# Patient Record
Sex: MALE | Race: WHITE | Employment: FULL TIME | URBAN - METROPOLITAN AREA
[De-identification: names, ages, dates, MRNs, and addresses within clinical notes are randomized per-mention and may not be internally consistent; named-entity substitution may affect disease eponyms.]

---

## 2017-01-06 ENCOUNTER — OFFICE VISIT (OUTPATIENT)
Dept: PEDIATRICS | Facility: CLINIC | Age: 52
End: 2017-01-06
Payer: COMMERCIAL

## 2017-01-06 VITALS
HEART RATE: 95 BPM | RESPIRATION RATE: 20 BRPM | BODY MASS INDEX: 34.02 KG/M2 | DIASTOLIC BLOOD PRESSURE: 60 MMHG | OXYGEN SATURATION: 97 % | WEIGHT: 224.5 LBS | SYSTOLIC BLOOD PRESSURE: 128 MMHG | HEIGHT: 68 IN | TEMPERATURE: 98.3 F

## 2017-01-06 DIAGNOSIS — R35.0 URINARY FREQUENCY: ICD-10-CM

## 2017-01-06 DIAGNOSIS — K57.32 DIVERTICULITIS OF COLON: Primary | ICD-10-CM

## 2017-01-06 LAB
ALBUMIN UR-MCNC: NEGATIVE MG/DL
APPEARANCE UR: CLEAR
BILIRUB UR QL STRIP: NEGATIVE
COLOR UR AUTO: YELLOW
ERYTHROCYTE [DISTWIDTH] IN BLOOD BY AUTOMATED COUNT: 13.4 % (ref 10–15)
GLUCOSE UR STRIP-MCNC: NEGATIVE MG/DL
HCT VFR BLD AUTO: 44.9 % (ref 40–53)
HGB BLD-MCNC: 14.9 G/DL (ref 13.3–17.7)
HGB UR QL STRIP: NEGATIVE
KETONES UR STRIP-MCNC: NEGATIVE MG/DL
LEUKOCYTE ESTERASE UR QL STRIP: NEGATIVE
MCH RBC QN AUTO: 28.3 PG (ref 26.5–33)
MCHC RBC AUTO-ENTMCNC: 33.2 G/DL (ref 31.5–36.5)
MCV RBC AUTO: 85 FL (ref 78–100)
NITRATE UR QL: NEGATIVE
PH UR STRIP: 5.5 PH (ref 5–7)
PLATELET # BLD AUTO: 228 10E9/L (ref 150–450)
RBC # BLD AUTO: 5.27 10E12/L (ref 4.4–5.9)
RBC #/AREA URNS AUTO: NORMAL /HPF (ref 0–2)
SP GR UR STRIP: 1.01 (ref 1–1.03)
URN SPEC COLLECT METH UR: NORMAL
UROBILINOGEN UR STRIP-ACNC: 0.2 EU/DL (ref 0.2–1)
WBC # BLD AUTO: 9.8 10E9/L (ref 4–11)
WBC #/AREA URNS AUTO: NORMAL /HPF (ref 0–2)

## 2017-01-06 PROCEDURE — 36415 COLL VENOUS BLD VENIPUNCTURE: CPT | Performed by: PHYSICIAN ASSISTANT

## 2017-01-06 PROCEDURE — 81001 URINALYSIS AUTO W/SCOPE: CPT | Performed by: PHYSICIAN ASSISTANT

## 2017-01-06 PROCEDURE — 99213 OFFICE O/P EST LOW 20 MIN: CPT | Performed by: PHYSICIAN ASSISTANT

## 2017-01-06 PROCEDURE — 85027 COMPLETE CBC AUTOMATED: CPT | Performed by: PHYSICIAN ASSISTANT

## 2017-01-06 RX ORDER — AMOXICILLIN AND CLAVULANATE POTASSIUM 500; 125 MG/1; MG/1
1 TABLET, FILM COATED ORAL 3 TIMES DAILY
Qty: 30 TABLET | Refills: 0 | Status: ON HOLD | OUTPATIENT
Start: 2017-01-06 | End: 2017-01-14

## 2017-01-06 NOTE — PATIENT INSTRUCTIONS
Diverticulitis    Some people get pouches along the wall of the colon as they get older. The pouches, called diverticuli, usually cause no symptoms. If the pouches become blocked, you can get an infection. This infection is called diverticulitis. It causes pain in your lower abdomen and fever. If not treated, it can become a serious condition, causing an abscess to form inside the pouch. The abscess may block the intestinal tract even or rupture, spreading infection throughout the abdomen.  When treatment is started early, oral antibiotics alone may be enough to cure diverticulitis. This method is tried first. But, if you don't improve or if your condition gets worse while you are trying oral antibiotics, you may need to be admitted to the hospital for IV antibiotics. Severe cases may require surgery.  Home care  The following guidelines will help you care for yourself at home:    During the acute illness, rest and follow a low-fiber diet. Include foods like:    Flake cereal, mashed potatoes, pancakes, waffles, pasta, white bread, rice, applesauce, bananas, eggs, meat, fish, poultry, tofu, and cooked vegetables    Take antibiotics exactly as the doctor says. Don't miss any doses or stop taking the medication, even if you feel better.    Monitor your temperature and report any rising temperatures to your doctor.  Preventing future attacks  Once you have had an episode of diverticulitis, you are at risk for having it again. After you have recovered from this episode, you may be able to reduce your risk by eating a high-fiber diet (20-35 gm/day of fiber). This cleans out the colon pouches that already exist and prevents new ones from forming. Foods high in fiber include fresh fruits and edible peelings, raw or lightly cooked vegetables, whole grain cereals and breads, dried beans and peas, and bran.  Other steps that can help prevent future attacks include:    Take your medications, such as antibiotics, as the doctor  says.    Drink 6 to 8 glasses of water every day, unless directed otherwise.    Use a heating pad or hot water bottle to reduce abdominal cramping or pain.    Begin an exercise program. Ask your doctor how to get started. You can benefit from simple activities such as walking or gardening.    Treat diarrhea with a bland diet. Start with liquids only; then slowly add fiber over time.    Watch for changes in your bowel movements (constipation to diarrhea).    Get plenty of rest and sleep.  Follow-up care  Follow up with your doctor as advised or sooner if you are not getting better in the next 2 days.  When to seek medical care  Get prompt medical attention if any of the following occur:    Fever of 100.4 F (38 C) or higher, or as directed by your health care provider    Repeated vomiting or swelling of the abdomen    Weakness, dizziness, light-headedness    Pain in your abdomen that gets worse, severe, or spreads to your back    Pain that moves to the right lower abdomen    Rectal bleeding (stools that are red, black or maroon color)    Unexpected vaginal bleeding    8858-0322 [employer]. [address] [place] [address], [place] [address]. All rights reserved. This information is not intended as a substitute for professional medical care. Always follow your healthcare professional's instructions.

## 2017-01-06 NOTE — PROGRESS NOTES
"  SUBJECTIVE:                                                    Phillip Nassar is a 51 year old male who presents to clinic today for the following health issues:      ABDOMINAL PAIN     Onset: 4 days ago     Description:   Character: Gnawing  Location: middle lower quadrant  Radiation: Back and Pelvic region    Intensity: mild-upper moderate/severe-AM    Progression of Symptoms:  improving    Accompanying Signs & Symptoms:  Fever/Chills?: no   Gas/Bloating: YES  Nausea: no   Vomitting: no   Diarrhea?: YES- loose stools  Constipation:YES- possibly  Dysuria or Hematuria: no    History:   Trauma: no   Previous similar pain: YES   Previous tests done: CT/hospitalized for small bowel obstruction a few years ago    Precipitating factors:   Does the pain change with:     Food: no -clear lqiuds    BM: YES- starts to subside    Urination: no     Alleviating factors:  Cranberry Juice    Therapies Tried and outcome: Cranberry Juice and fluids     LMP:  not applicable       Medical: History of Diverticulitis    ROS:  ROS negative except as listed above      OBJECTIVE:                                                    /60 mmHg  Pulse 95  Temp(Src) 98.3  F (36.8  C) (Oral)  Resp 20  Ht 5' 8\" (1.727 m)  Wt 224 lb 8 oz (101.833 kg)  BMI 34.14 kg/m2  SpO2 97%  Body mass index is 34.14 kg/(m^2).  GENERAL: healthy, alert and no distress  EYES: Eyes grossly normal to inspection, PERRL and conjunctivae and sclerae normal  HENT: ear canals and TM's normal, nose and mouth without ulcers or lesions  NECK: no adenopathy, no asymmetry, masses, or scars and thyroid normal to palpation  RESP: lungs clear to auscultation - no rales, rhonchi or wheezes  CV: regular rate and rhythm, normal S1 S2, no S3 or S4, no murmur, click or rub, no peripheral edema and peripheral pulses strong  ABDOMEN: LLQ tenderness.  No rebound tenderness.  Otherwise soft, nontender, no hepatosplenomegaly, no masses and bowel sounds normal  MS: no gross " musculoskeletal defects noted, no edema    Diagnostic Test Results:  Results for orders placed or performed in visit on 01/06/17 (from the past 24 hour(s))   UA with Microscopic reflex to Culture   Result Value Ref Range    Color Urine Yellow     Appearance Urine Clear     Glucose Urine Negative NEG mg/dL    Bilirubin Urine Negative NEG    Ketones Urine Negative NEG mg/dL    Specific Gravity Urine 1.010 1.003 - 1.035    pH Urine 5.5 5.0 - 7.0 pH    Protein Albumin Urine Negative NEG mg/dL    Urobilinogen Urine 0.2 0.2 - 1.0 EU/dL    Nitrite Urine Negative NEG    Blood Urine Negative NEG    Leukocyte Esterase Urine Negative NEG    Source Midstream Urine     WBC Urine O - 2 0 - 2 /HPF    RBC Urine O - 2 0 - 2 /HPF   CBC with platelets   Result Value Ref Range    WBC 9.8 4.0 - 11.0 10e9/L    RBC Count 5.27 4.4 - 5.9 10e12/L    Hemoglobin 14.9 13.3 - 17.7 g/dL    Hematocrit 44.9 40.0 - 53.0 %    MCV 85 78 - 100 fl    MCH 28.3 26.5 - 33.0 pg    MCHC 33.2 31.5 - 36.5 g/dL    RDW 13.4 10.0 - 15.0 %    Platelet Count 228 150 - 450 10e9/L        ASSESSMENT/PLAN:                                                    (K57.32) Diverticulitis of colon  (primary encounter diagnosis)  Comment: LLQ pain, History of diverticulitis  Plan: CBC with platelets, amoxicillin-clavulanate         (AUGMENTIN) 500-125 MG per tablet,         acetaminophen-codeine (TYLENOL #3) 300-30 MG         per tablet   Restricted diet   Follow up with PCP on Monday        Discussed the importance of using pain as a measure of disease progression    (R35.0) Urinary frequency  Plan: UA with Microscopic reflex to Culture        Christopher Hernandez PA-C  [facility]

## 2017-01-06 NOTE — MR AVS SNAPSHOT
After Visit Summary   1/6/2017    Phillip Nassar    MRN: [id number]           Patient Information     Date Of Birth          1965        Visit Information        Provider Department      1/6/2017 4:00 PM Christopher Hernandez PA-C [facility]        Today's Diagnoses     Urinary frequency    -  1     Lower abdominal pain         Diverticulitis of colon           Care Instructions      Diverticulitis    Some people get pouches along the wall of the colon as they get older. The pouches, called diverticuli, usually cause no symptoms. If the pouches become blocked, you can get an infection. This infection is called diverticulitis. It causes pain in your lower abdomen and fever. If not treated, it can become a serious condition, causing an abscess to form inside the pouch. The abscess may block the intestinal tract even or rupture, spreading infection throughout the abdomen.  When treatment is started early, oral antibiotics alone may be enough to cure diverticulitis. This method is tried first. But, if you don't improve or if your condition gets worse while you are trying oral antibiotics, you may need to be admitted to the hospital for IV antibiotics. Severe cases may require surgery.  Home care  The following guidelines will help you care for yourself at home:    During the acute illness, rest and follow a low-fiber diet. Include foods like:    Flake cereal, mashed potatoes, pancakes, waffles, pasta, white bread, rice, applesauce, bananas, eggs, meat, fish, poultry, tofu, and cooked vegetables    Take antibiotics exactly as the doctor says. Don't miss any doses or stop taking the medication, even if you feel better.    Monitor your temperature and report any rising temperatures to your doctor.  Preventing future attacks  Once you have had an episode of diverticulitis, you are at risk for having it again. After you have recovered from this episode, you may be able to reduce your risk  by eating a high-fiber diet (20-35 gm/day of fiber). This cleans out the colon pouches that already exist and prevents new ones from forming. Foods high in fiber include fresh fruits and edible peelings, raw or lightly cooked vegetables, whole grain cereals and breads, dried beans and peas, and bran.  Other steps that can help prevent future attacks include:    Take your medications, such as antibiotics, as the doctor says.    Drink 6 to 8 glasses of water every day, unless directed otherwise.    Use a heating pad or hot water bottle to reduce abdominal cramping or pain.    Begin an exercise program. Ask your doctor how to get started. You can benefit from simple activities such as walking or gardening.    Treat diarrhea with a bland diet. Start with liquids only; then slowly add fiber over time.    Watch for changes in your bowel movements (constipation to diarrhea).    Get plenty of rest and sleep.  Follow-up care  Follow up with your doctor as advised or sooner if you are not getting better in the next 2 days.  When to seek medical care  Get prompt medical attention if any of the following occur:    Fever of 100.4 F (38 C) or higher, or as directed by your health care provider    Repeated vomiting or swelling of the abdomen    Weakness, dizziness, light-headedness    Pain in your abdomen that gets worse, severe, or spreads to your back    Pain that moves to the right lower abdomen    Rectal bleeding (stools that are red, black or maroon color)    Unexpected vaginal bleeding    2361-8068 [employer]. [address] [place] [address], [place] [address]. All rights reserved. This information is not intended as a substitute for professional medical care. Always follow your healthcare professional's instructions.              Follow-ups after your visit        Who to contact     If you have questions or need follow up information about today's clinic visit or your schedule please contact [facility] [place]  "directly at [phone].  Normal or non-critical lab and imaging results will be communicated to you by [employer], letter or phone within 4 business days after the clinic has received the results. If you do not hear from us within 7 days, please contact the clinic through [employer] or phone. If you have a critical or abnormal lab result, we will notify you by phone as soon as possible.  Submit refill requests through [employer] or call your pharmacy and they will forward the refill request to us. Please allow 3 business days for your refill to be completed.          Additional Information About Your Visit        [employer] Information     [employer] gives you secure access to your electronic health record. If you see a primary care provider, you can also send messages to your care team and make appointments. If you have questions, please call your primary care clinic.  If you do not have a primary care provider, please call [phone] and they will assist you.        Care EveryWhere ID     This is your Care EveryWhere ID. This could be used by other organizations to access your [place] medical records  [id number]        Your Vitals Were     Pulse Temperature Respirations Height BMI (Body Mass Index) Pulse Oximetry    95 98.3  F (36.8  C) (Oral) 20 5' 8\" (1.727 m) 34.14 kg/m2 97%       Blood Pressure from Last 3 Encounters:   01/06/17 128/60   09/09/16 120/76   07/05/16 102/70    Weight from Last 3 Encounters:   01/06/17 224 lb 8 oz (101.833 kg)   09/09/16 219 lb 8 oz (99.565 kg)   07/05/16 219 lb (99.338 kg)              We Performed the Following     CBC with platelets     UA with Microscopic reflex to Culture          Today's Medication Changes          These changes are accurate as of: 1/6/17  5:16 PM.  If you have any questions, ask your nurse or doctor.               Start taking these medicines.        Dose/Directions    amoxicillin-clavulanate 500-125 MG per tablet   Commonly known as:  AUGMENTIN   Used for:  Lower " abdominal pain, Diverticulitis of colon   Started by:  Christopher Hernandez PA-C        Dose:  1 tablet   Take 1 tablet by mouth 3 times daily   Quantity:  30 tablet   Refills:  0            Where to get your medicines      These medications were sent to [facility] #[id number] - [place], [place] - [address] AT [place] [address], [place] [address]     Phone:  [phone]    - amoxicillin-clavulanate 500-125 MG per tablet             Primary Care Provider Office Phone # Fax #    Albert Chapman [phone]       [facility] [address] [place] [address]        Thank you!     Thank you for choosing [facility]  for your care. Our goal is always to provide you with excellent care. Hearing back from our patients is one way we can continue to improve our services. Please take a few minutes to complete the written survey that you may receive in the mail after your visit with us. Thank you!             Your Updated Medication List - Protect others around you: Learn how to safely use, store and throw away your medicines at www.disposemymeds.org.          This list is accurate as of: 1/6/17  5:16 PM.  Always use your most recent med list.                   Brand Name Dispense Instructions for use    * albuterol 108 (90 BASE) MCG/ACT Inhaler    PROAIR HFA/PROVENTIL HFA/VENTOLIN HFA    3 Inhaler    Inhale 2 puffs into the lungs every 6 hours as needed for shortness of breath / dyspnea       * albuterol 108 (90 BASE) MCG/ACT Inhaler    albuterol    2 Inhaler    Inhale 2 puffs into the lungs every 6 hours       amoxicillin-clavulanate 500-125 MG per tablet    AUGMENTIN    30 tablet    Take 1 tablet by mouth 3 times daily       fluticasone 50 MCG/ACT spray    FLONASE    3 Package    Spray 1-2 sprays into both nostrils daily       LACTOBACILLUS EXTRA STRENGTH Caps     20 capsule    Take 1 tablet by mouth daily       montelukast 10 MG tablet     SINGULAIR    90 tablet    Take 1 tablet (10 mg) by mouth At Bedtime       MOTRIN IB PO      Take 400 mg by mouth every 4 hours as needed       TYLENOL PO      Take 325 mg by mouth as needed       ZYRTEC ALLERGY 10 MG Caps   Generic drug:  cetirizine HCl          * Notice:  This list has 2 medication(s) that are the same as other medications prescribed for you. Read the directions carefully, and ask your doctor or other care provider to review them with you.

## 2017-01-06 NOTE — NURSING NOTE
"Chief Complaint   Patient presents with     Abdominal Pain       Initial /60 mmHg  Pulse 95  Temp(Src) 98.3  F (36.8  C) (Oral)  Resp 20  Ht 5' 8\" (1.727 m)  Wt 224 lb 8 oz (101.833 kg)  BMI 34.14 kg/m2  SpO2 97% Estimated body mass index is 34.14 kg/(m^2) as calculated from the following:    Height as of this encounter: 5' 8\" (1.727 m).    Weight as of this encounter: 224 lb 8 oz (101.833 kg).  BP completed using cuff size: tawana Noel MA      "

## 2017-01-10 ENCOUNTER — TELEPHONE (OUTPATIENT)
Dept: URGENT CARE | Facility: URGENT CARE | Age: 52
End: 2017-01-10

## 2017-01-10 NOTE — TELEPHONE ENCOUNTER
Patient was directed to follow up with primary on Monday.  It appears he did not.  Please contact him and advise that he have follow up appointment.

## 2017-01-11 ENCOUNTER — MYC MEDICAL ADVICE (OUTPATIENT)
Dept: PEDIATRICS | Facility: CLINIC | Age: 52
End: 2017-01-11

## 2017-01-11 ENCOUNTER — HOSPITAL ENCOUNTER (OUTPATIENT)
Dept: CT IMAGING | Facility: CLINIC | Age: 52
Discharge: HOME | End: 2017-01-11
Attending: PEDIATRICS | Admitting: PEDIATRICS
Payer: COMMERCIAL

## 2017-01-11 ENCOUNTER — OFFICE VISIT (OUTPATIENT)
Dept: PEDIATRICS | Facility: CLINIC | Age: 52
End: 2017-01-11
Payer: COMMERCIAL

## 2017-01-11 ENCOUNTER — HOSPITAL ENCOUNTER (INPATIENT)
Facility: CLINIC | Age: 52
LOS: 3 days | Discharge: HOME | DRG: 392 | End: 2017-01-14
Attending: EMERGENCY MEDICINE | Admitting: INTERNAL MEDICINE
Payer: COMMERCIAL

## 2017-01-11 VITALS
HEIGHT: 68 IN | WEIGHT: 224 LBS | TEMPERATURE: 98.9 F | BODY MASS INDEX: 33.95 KG/M2 | HEART RATE: 82 BPM | SYSTOLIC BLOOD PRESSURE: 128 MMHG | OXYGEN SATURATION: 98 % | DIASTOLIC BLOOD PRESSURE: 60 MMHG

## 2017-01-11 DIAGNOSIS — K57.32 DIVERTICULITIS OF COLON: ICD-10-CM

## 2017-01-11 DIAGNOSIS — K57.92 ACUTE DIVERTICULITIS: Primary | ICD-10-CM

## 2017-01-11 DIAGNOSIS — R35.0 URINARY FREQUENCY: ICD-10-CM

## 2017-01-11 DIAGNOSIS — K57.32 DIVERTICULITIS OF COLON: Primary | ICD-10-CM

## 2017-01-11 DIAGNOSIS — K63.2 ENTERO-COLONIC FISTULA: ICD-10-CM

## 2017-01-11 LAB
ALBUMIN UR-MCNC: NEGATIVE MG/DL
ANION GAP SERPL CALCULATED.3IONS-SCNC: 7 MMOL/L (ref 3–14)
APPEARANCE UR: CLEAR
BASOPHILS # BLD AUTO: 0 10E9/L (ref 0–0.2)
BASOPHILS NFR BLD AUTO: 0.1 %
BILIRUB UR QL STRIP: NEGATIVE
BUN SERPL-MCNC: 10 MG/DL (ref 7–30)
CALCIUM SERPL-MCNC: 9.1 MG/DL (ref 8.5–10.1)
CHLORIDE SERPL-SCNC: 103 MMOL/L (ref 94–109)
CO2 SERPL-SCNC: 29 MMOL/L (ref 20–32)
COLOR UR AUTO: YELLOW
CREAT SERPL-MCNC: 0.82 MG/DL (ref 0.66–1.25)
DIFFERENTIAL METHOD BLD: NORMAL
EOSINOPHIL # BLD AUTO: 0.3 10E9/L (ref 0–0.7)
EOSINOPHIL NFR BLD AUTO: 4 %
ERYTHROCYTE [DISTWIDTH] IN BLOOD BY AUTOMATED COUNT: 13.2 % (ref 10–15)
GFR SERPL CREATININE-BSD FRML MDRD: NORMAL ML/MIN/1.7M2
GLUCOSE SERPL-MCNC: 82 MG/DL (ref 70–99)
GLUCOSE UR STRIP-MCNC: NEGATIVE MG/DL
HCT VFR BLD AUTO: 44 % (ref 40–53)
HGB BLD-MCNC: 14.6 G/DL (ref 13.3–17.7)
HGB UR QL STRIP: NEGATIVE
IMM GRANULOCYTES # BLD: 0 10E9/L (ref 0–0.4)
IMM GRANULOCYTES NFR BLD: 0.1 %
KETONES UR STRIP-MCNC: NEGATIVE MG/DL
LEUKOCYTE ESTERASE UR QL STRIP: NEGATIVE
LYMPHOCYTES # BLD AUTO: 2 10E9/L (ref 0.8–5.3)
LYMPHOCYTES NFR BLD AUTO: 28.2 %
MCH RBC QN AUTO: 28 PG (ref 26.5–33)
MCHC RBC AUTO-ENTMCNC: 33.2 G/DL (ref 31.5–36.5)
MCV RBC AUTO: 85 FL (ref 78–100)
MONOCYTES # BLD AUTO: 0.6 10E9/L (ref 0–1.3)
MONOCYTES NFR BLD AUTO: 9.2 %
NEUTROPHILS # BLD AUTO: 4.1 10E9/L (ref 1.6–8.3)
NEUTROPHILS NFR BLD AUTO: 58.4 %
NITRATE UR QL: NEGATIVE
NRBC # BLD AUTO: 0 10*3/UL
NRBC BLD AUTO-RTO: 0 /100
PH UR STRIP: 7 PH (ref 5–7)
PLATELET # BLD AUTO: 249 10E9/L (ref 150–450)
POTASSIUM SERPL-SCNC: 3.9 MMOL/L (ref 3.4–5.3)
RADIOLOGIST FLAGS: ABNORMAL
RBC # BLD AUTO: 5.21 10E12/L (ref 4.4–5.9)
RBC #/AREA URNS AUTO: <1 /HPF (ref 0–2)
SODIUM SERPL-SCNC: 139 MMOL/L (ref 133–144)
SP GR UR STRIP: 1.04 (ref 1–1.03)
URN SPEC COLLECT METH UR: ABNORMAL
UROBILINOGEN UR STRIP-MCNC: NORMAL MG/DL (ref 0–2)
WBC # BLD AUTO: 7 10E9/L (ref 4–11)
WBC #/AREA URNS AUTO: <1 /HPF (ref 0–2)

## 2017-01-11 PROCEDURE — 25500064 ZZH RX 255 OP 636: Performed by: STUDENT IN AN ORGANIZED HEALTH CARE EDUCATION/TRAINING PROGRAM

## 2017-01-11 PROCEDURE — 99285 EMERGENCY DEPT VISIT HI MDM: CPT | Mod: 25

## 2017-01-11 PROCEDURE — 25000128 H RX IP 250 OP 636: Performed by: EMERGENCY MEDICINE

## 2017-01-11 PROCEDURE — 25000125 ZZHC RX 250: Performed by: STUDENT IN AN ORGANIZED HEALTH CARE EDUCATION/TRAINING PROGRAM

## 2017-01-11 PROCEDURE — 96365 THER/PROPH/DIAG IV INF INIT: CPT

## 2017-01-11 PROCEDURE — 96376 TX/PRO/DX INJ SAME DRUG ADON: CPT

## 2017-01-11 PROCEDURE — 25000125 ZZHC RX 250: Performed by: EMERGENCY MEDICINE

## 2017-01-11 PROCEDURE — 99214 OFFICE O/P EST MOD 30 MIN: CPT | Mod: GC | Performed by: INTERNAL MEDICINE

## 2017-01-11 PROCEDURE — 80048 BASIC METABOLIC PNL TOTAL CA: CPT | Performed by: EMERGENCY MEDICINE

## 2017-01-11 PROCEDURE — 99222 1ST HOSP IP/OBS MODERATE 55: CPT | Mod: AI | Performed by: INTERNAL MEDICINE

## 2017-01-11 PROCEDURE — 85025 COMPLETE CBC W/AUTO DIFF WBC: CPT | Performed by: EMERGENCY MEDICINE

## 2017-01-11 PROCEDURE — 12000000 ZZH R&B MED SURG/OB

## 2017-01-11 PROCEDURE — 96375 TX/PRO/DX INJ NEW DRUG ADDON: CPT

## 2017-01-11 PROCEDURE — 81001 URINALYSIS AUTO W/SCOPE: CPT | Performed by: EMERGENCY MEDICINE

## 2017-01-11 PROCEDURE — 74177 CT ABD & PELVIS W/CONTRAST: CPT

## 2017-01-11 PROCEDURE — 96366 THER/PROPH/DIAG IV INF ADDON: CPT

## 2017-01-11 PROCEDURE — 96367 TX/PROPH/DG ADDL SEQ IV INF: CPT

## 2017-01-11 RX ORDER — IOPAMIDOL 755 MG/ML
135 INJECTION, SOLUTION INTRAVASCULAR ONCE
Status: COMPLETED | OUTPATIENT
Start: 2017-01-11 | End: 2017-01-11

## 2017-01-11 RX ORDER — HYDROMORPHONE HYDROCHLORIDE 1 MG/ML
0.5 INJECTION, SOLUTION INTRAMUSCULAR; INTRAVENOUS; SUBCUTANEOUS ONCE
Status: COMPLETED | OUTPATIENT
Start: 2017-01-11 | End: 2017-01-11

## 2017-01-11 RX ORDER — CIPROFLOXACIN 2 MG/ML
400 INJECTION, SOLUTION INTRAVENOUS ONCE
Status: COMPLETED | OUTPATIENT
Start: 2017-01-11 | End: 2017-01-11

## 2017-01-11 RX ADMIN — CIPROFLOXACIN 400 MG: 2 INJECTION, SOLUTION INTRAVENOUS at 19:33

## 2017-01-11 RX ADMIN — SODIUM CHLORIDE 1000 ML: 9 INJECTION, SOLUTION INTRAVENOUS at 19:22

## 2017-01-11 RX ADMIN — IOPAMIDOL 135 ML: 755 INJECTION, SOLUTION INTRAVENOUS at 14:07

## 2017-01-11 RX ADMIN — HYDROMORPHONE HYDROCHLORIDE 0.5 MG: 1 INJECTION, SOLUTION INTRAMUSCULAR; INTRAVENOUS; SUBCUTANEOUS at 22:33

## 2017-01-11 RX ADMIN — SODIUM CHLORIDE, PRESERVATIVE FREE 84 ML: 5 INJECTION INTRAVENOUS at 14:07

## 2017-01-11 RX ADMIN — METRONIDAZOLE 500 MG: 500 INJECTION, SOLUTION INTRAVENOUS at 20:34

## 2017-01-11 RX ADMIN — HYDROMORPHONE HYDROCHLORIDE 0.5 MG: 1 INJECTION, SOLUTION INTRAMUSCULAR; INTRAVENOUS; SUBCUTANEOUS at 19:21

## 2017-01-11 ASSESSMENT — ENCOUNTER SYMPTOMS
HEMATURIA: 0
NAUSEA: 0
FEVER: 0
DYSURIA: 0
BLOOD IN STOOL: 0
VOMITING: 0
ABDOMINAL PAIN: 1
DIARRHEA: 0

## 2017-01-11 NOTE — IP AVS SNAPSHOT
[place] [address] [facility]    [address]    [facility] [address]    Phone:  [phone]    Fax:  [phone]                                       After Visit Summary   1/11/2017    Phillip Nassar    MRN: [id number]           After Visit Summary Signature Page     I have received my discharge instructions, and my questions have been answered. I have discussed any challenges I see with this plan with the nurse or doctor.    ..........................................................................................................................................  Patient/Patient Representative Signature      ..........................................................................................................................................  Patient Representative Print Name and Relationship to Patient    ..................................................               ................................................  Date                                            Time    ..........................................................................................................................................  Reviewed by Signature/Title    ...................................................              ..............................................  Date                                                            Time

## 2017-01-11 NOTE — NURSING NOTE
"Chief Complaint   Patient presents with     RECHECK     Follow up        Initial /60 mmHg  Pulse 82  Temp(Src) 98.9  F (37.2  C) (Tympanic)  Ht 5' 8\" (1.727 m)  Wt 224 lb (101.606 kg)  BMI 34.07 kg/m2  SpO2 98% Estimated body mass index is 34.07 kg/(m^2) as calculated from the following:    Height as of this encounter: 5' 8\" (1.727 m).    Weight as of this encounter: 224 lb (101.606 kg).  BP completed using cuff size: large    "

## 2017-01-11 NOTE — TELEPHONE ENCOUNTER
Order pended for signature if appropriate. Please check that it is entered correctly as patient wants scheduled at [facility] prior to signature.   Judie Ignacio RN

## 2017-01-11 NOTE — PATIENT INSTRUCTIONS
1. Go to the ED and they will help evaluate if you need admission for IV antibiotics.   2. If you are not admitted you should be seen in clinic in the next 24 - 48 hours.

## 2017-01-11 NOTE — MR AVS SNAPSHOT
After Visit Summary   1/11/2017    Phillip Nassar    MRN: [id number]           Patient Information     Date Of Birth          1965        Visit Information        Provider Department      1/11/2017 4:30 PM Carroll Toney MD [facility]        Care Instructions    1. Go to the ED and they will help evaluate if you need admission for IV antibiotics.   2. If you are not admitted you should be seen in clinic in the next 24 - 48 hours.         Follow-ups after your visit        Future tests that were ordered for you today     Open Future Orders        Priority Expected Expires Ordered    CT Abdomen Pelvis w Contrast Routine  1/11/2018 1/11/2017            Who to contact     If you have questions or need follow up information about today's clinic visit or your schedule please contact [facility] directly at [phone].  Normal or non-critical lab and imaging results will be communicated to you by [employer], letter or phone within 4 business days after the clinic has received the results. If you do not hear from us within 7 days, please contact the clinic through [employer] or phone. If you have a critical or abnormal lab result, we will notify you by phone as soon as possible.  Submit refill requests through [employer] or call your pharmacy and they will forward the refill request to us. Please allow 3 business days for your refill to be completed.          Additional Information About Your Visit        [employer] Information     [employer] gives you secure access to your electronic health record. If you see a primary care provider, you can also send messages to your care team and make appointments. If you have questions, please call your primary care clinic.  If you do not have a primary care provider, please call [phone] and they will assist you.        Care EveryWhere ID     This is your Care EveryWhere ID. This could be used by other organizations to access your [place]  "medical records  [id number]        Your Vitals Were     Pulse Temperature Height BMI (Body Mass Index) Pulse Oximetry       82 98.9  F (37.2  C) (Tympanic) 5' 8\" (1.727 m) 34.07 kg/m2 98%        Blood Pressure from Last 3 Encounters:   01/11/17 128/60   01/06/17 128/60   09/09/16 120/76    Weight from Last 3 Encounters:   01/11/17 224 lb (101.606 kg)   01/06/17 224 lb 8 oz (101.833 kg)   09/09/16 219 lb 8 oz (99.565 kg)              Today, you had the following     No orders found for display       Primary Care Provider Office Phone # Fax #    Albert Chapman [phone]       [facility] [address] [place] [address]        Thank you!     Thank you for choosing [facility]  for your care. Our goal is always to provide you with excellent care. Hearing back from our patients is one way we can continue to improve our services. Please take a few minutes to complete the written survey that you may receive in the mail after your visit with us. Thank you!             Your Updated Medication List - Protect others around you: Learn how to safely use, store and throw away your medicines at www.disposemymeds.org.          This list is accurate as of: 1/11/17  5:19 PM.  Always use your most recent med list.                   Brand Name Dispense Instructions for use    * albuterol 108 (90 BASE) MCG/ACT Inhaler    PROAIR HFA/PROVENTIL HFA/VENTOLIN HFA    3 Inhaler    Inhale 2 puffs into the lungs every 6 hours as needed for shortness of breath / dyspnea       * albuterol 108 (90 BASE) MCG/ACT Inhaler    albuterol    2 Inhaler    Inhale 2 puffs into the lungs every 6 hours       amoxicillin-clavulanate 500-125 MG per tablet    AUGMENTIN    30 tablet    Take 1 tablet by mouth 3 times daily       fluticasone 50 MCG/ACT spray    FLONASE    3 Package    Spray 1-2 sprays into both nostrils daily       LACTOBACILLUS EXTRA STRENGTH Caps     20 capsule    Take 1 tablet by mouth daily       " montelukast 10 MG tablet    SINGULAIR    90 tablet    Take 1 tablet (10 mg) by mouth At Bedtime       MOTRIN IB PO      Take 400 mg by mouth every 4 hours as needed       TYLENOL PO      Take 325 mg by mouth as needed       ZYRTEC ALLERGY 10 MG Caps   Generic drug:  cetirizine HCl          * Notice:  This list has 2 medication(s) that are the same as other medications prescribed for you. Read the directions carefully, and ask your doctor or other care provider to review them with you.

## 2017-01-11 NOTE — PROGRESS NOTES
"  SUBJECTIVE:                                                    Phillip Nassar is a 51 year old male who presents to clinic today for the following health issues:    He had an episodes of ileitis with SBO and concern for diverticulitis about 3 years ago. On 1/4 he developed some aching lower abdominal pain that worsened and became associated with diarrhea over the next several days. He was seen in urgent care on 1/6 and diagnosed clinically with diverticulitis. He was started on Augmentin. He did not have a leukocytosis. His pain and diarrhea gradually improved until last night when he developed more severe pain with radiation throughout his abdomen that was a 5/10 in severity. The pain gradually improved last night into this morning and upon evaluation in clinic he has almost no pain unless palpated. He has been afebrile throughout. His last bowel movement was about 1100 today and was normal. He has had no bloody stools. He has had no nausea or vomiting. He was able to work a full day today. He has had some urinary frequency but no urinary urgency or dysuria.     Problem list and histories reviewed & adjusted, as indicated.  Additional history: as documented    Problem list, Medication list, Allergies, and Medical/Social/Surgical histories reviewed in [facility] and updated as appropriate.    ROS:  Constitutional, HEENT, cardiovascular, pulmonary, gi and gu systems are negative, except as otherwise noted.    OBJECTIVE:                                                    /60 mmHg  Pulse 82  Temp(Src) 98.9  F (37.2  C) (Tympanic)  Ht 5' 8\" (1.727 m)  Wt 224 lb (101.606 kg)  BMI 34.07 kg/m2  SpO2 98%  Body mass index is 34.07 kg/(m^2).  GENERAL: healthy, alert and no distress  NECK: no adenopathy, no asymmetry, masses, or scars and thyroid normal to palpation  HEENT: No mouth sores. Normal OP.   RESP: lungs clear to auscultation - no rales, rhonchi or wheezes  CV: regular rate and rhythm, normal S1 S2, no S3 or " S4, no murmur, click or rub, no peripheral edema and peripheral pulses strong  ABDOMEN: soft, mild suprapubic tenderness without rebound or guarding. No hepatosplenomegaly, no masses and bowel sounds normal  MS: no gross musculoskeletal defects noted, no edema    Diagnostic Test Results:  none      ASSESSMENT/PLAN:                                                    1. Acute diverticulitis  Acute diverticulitis by history and CT, however given past episode of ileitis requiring hospital admission also concern for underlying IBD (Crohns). With concern for fistulization briefly discussed case with GI and emergency department, we don't have any imaging from previous during this bout of pain to assess the progression of this possible fistula. He does not meet SIRS criteria (though we don't have a white blood cell count). Clinically he appears quite well. Advised patient to go to the ED for evaluation for need for admission for IV antibiotics. He was amenable. He is stable and safe to transport himself. If he is not admitted advised that he be seen in the next 48 hours, prior to the weekend.     See patient instructions.     Carroll Riley MD  [facility]    I have seen and examined the patient, discussed with the resident and agree with the history, physical and plan as documented above.  Due to possibility of enterocolonic fistula on imaging, referred to ER for additional evaluation.  Patient agreeable to plan.   Concern for additional underlying IBD diagnosis given history not classic for diverticulitis.    Franca Garcia MD  Internal Medicine - Pediatrics

## 2017-01-11 NOTE — IP AVS SNAPSHOT
MRN:[id number]                      After Visit Summary   1/11/2017    Phillip Nassar    MRN: [id number]           Thank you!     Thank you for choosing [facility] for your care. Our goal is always to provide you with excellent care. Hearing back from our patients is one way we can continue to improve our services. Please take a few minutes to complete the written survey that you may receive in the mail after you visit. If you would like to speak to someone directly about your visit please contact Patient Relations at [phone]. Thank you!          Patient Information     Date Of Birth          1965        About your hospital stay     You were admitted on:  January 11, 2017 You last received care in the:  [place] [id number] [facility]    You were discharged on:  January 14, 2017        Reason for your hospital stay       Flare up of diverticulitis                  Who to Call     For medical emergencies, please call 911.  For non-urgent questions about your medical care, please call your primary care provider or clinic, [phone]          Attending Provider     Provider    Rita Ervin MD    Her, MD Chary       Primary Care Provider Office Phone # Fax #    Albert Chapman [phone]       [facility] [address] [place] [address]        After Care Instructions     Diet       Follow this diet upon discharge: Low fiber diet for next 10 days, gradually advance to regular diet as tolerated                  Follow-up Appointments     Follow-up and recommended labs and tests        Follow up with primary care provider, Albert Chapman, in one week , for hospital follow- up.     Follow up with colo-rectal surgery in about 6 weeks, to discuss plan for future and colonoscopy                  Pending Results     No orders found from 1/10/2017 to 1/12/2017.            Statement of Approval     Ordered          01/14/17 1233  I have  "reviewed and agree with all the recommendations and orders detailed in this document.   EFFECTIVE NOW     Approved and electronically signed by:  Lico Walker MD             Admission Information        Provider Department Dept Phone    1/11/2017 Chary Philippe MD, MD  [facility] [phone]      Your Vitals Were     Blood Pressure Pulse Temperature    111/69 mmHg 67 97.4  F (36.3  C) (Oral)    Respirations Height Weight    18 1.727 m (5' 8\") 100.5 kg (221 lb 9 oz)    BMI (Body Mass Index) Pulse Oximetry       33.70 kg/m2 95%       MyChart Information     [employer] gives you secure access to your electronic health record. If you see a primary care provider, you can also send messages to your care team and make appointments. If you have questions, please call your primary care clinic.  If you do not have a primary care provider, please call [phone] and they will assist you.        Care EveryWhere ID     This is your Care EveryWhere ID. This could be used by other organizations to access your [facility] medical records  [id number]           Review of your medicines      START taking        Dose / Directions    ciprofloxacin 500 MG tablet   Commonly known as:  CIPRO   Used for:  Acute diverticulitis        Dose:  500 mg   Take 1 tablet (500 mg) by mouth 2 times daily for 10 days   Quantity:  20 tablet   Refills:  0       CULTUREE DIGESTIVE HEALTH Caps   Used for:  Acute diverticulitis        Dose:  1 capsule   Take 1 capsule by mouth 2 times daily for 10 days   Quantity:  20 capsule   Refills:  0       HYDROcodone-acetaminophen 5-325 MG per tablet   Commonly known as:  NORCO   Used for:  Acute diverticulitis        Dose:  1 tablet   Take 1 tablet by mouth every 6 hours as needed for moderate to severe pain   Quantity:  15 tablet   Refills:  0       metroNIDAZOLE 500 MG tablet   Commonly known as:  FLAGYL   Used for:  Acute diverticulitis        Dose:  500 mg   Take 1 tablet (500 mg) by mouth 3 times " daily for 10 days   Quantity:  30 tablet   Refills:  0         CONTINUE these medicines which have NOT CHANGED        Dose / Directions    albuterol 108 (90 BASE) MCG/ACT Inhaler   Commonly known as:  PROAIR HFA/PROVENTIL HFA/VENTOLIN HFA   Used for:  Intermittent asthma        Dose:  2 puff   Inhale 2 puffs into the lungs every 6 hours as needed for shortness of breath / dyspnea   Quantity:  3 Inhaler   Refills:  1       fluticasone 50 MCG/ACT spray   Commonly known as:  FLONASE   Used for:  Allergic rhinitis, cause unspecified        Dose:  1-2 spray   Spray 1-2 sprays into both nostrils daily   Quantity:  3 Package   Refills:  3       montelukast 10 MG tablet   Commonly known as:  SINGULAIR   Used for:  Intermittent asthma, uncomplicated        Dose:  10 mg   Take 1 tablet (10 mg) by mouth At Bedtime   Quantity:  90 tablet   Refills:  3       vitamin C-electrolytes 1000mg vitamin C super orange drink mix   Commonly known as:  EMERGEN-C        Dose:  1 packet   Take 1 packet by mouth daily Mix 1 packet in 4-6oz water and take once or twice daily or as directed.   Refills:  0       ZYRTEC ALLERGY 10 MG Caps   Generic drug:  cetirizine HCl        Take by mouth daily   Refills:  0         STOP taking     amoxicillin-clavulanate 500-125 MG per tablet   Commonly known as:  AUGMENTIN                Where to get your medicines      These medications were sent to [facility] [place], [place] - [address] [place] [address]     Phone:  [phone]    - ciprofloxacin 500 MG tablet  - CULTURELLE DIGESTIVE HEALTH Caps  - metroNIDAZOLE 500 MG tablet      Some of these will need a paper prescription and others can be bought over the counter. Ask your nurse if you have questions.     Bring a paper prescription for each of these medications    - HYDROcodone-acetaminophen 5-325 MG per tablet             Protect others around you: Learn how to safely use, store and throw away  your medicines at www.disposemymeds.org.             Medication List: This is a list of all your medications and when to take them. Check marks below indicate your daily home schedule. Keep this list as a reference.      Medications           Morning Afternoon Evening Bedtime As Needed    albuterol 108 (90 BASE) MCG/ACT Inhaler   Commonly known as:  PROAIR HFA/PROVENTIL HFA/VENTOLIN HFA   Inhale 2 puffs into the lungs every 6 hours as needed for shortness of breath / dyspnea                                ciprofloxacin 500 MG tablet   Commonly known as:  CIPRO   Take 1 tablet (500 mg) by mouth 2 times daily for 10 days   Last time this was given:  500 mg on 1/14/2017  8:40 AM                                [facility] Caps   Take 1 capsule by mouth 2 times daily for 10 days                                fluticasone 50 MCG/ACT spray   Commonly known as:  FLONASE   Spray 1-2 sprays into both nostrils daily                                HYDROcodone-acetaminophen 5-325 MG per tablet   Commonly known as:  NORCO   Take 1 tablet by mouth every 6 hours as needed for moderate to severe pain   Last time this was given:  2 tablets on 1/14/2017  8:40 AM                                metroNIDAZOLE 500 MG tablet   Commonly known as:  FLAGYL   Take 1 tablet (500 mg) by mouth 3 times daily for 10 days   Last time this was given:  250 mg on 1/14/2017  5:30 AM                                montelukast 10 MG tablet   Commonly known as:  SINGULAIR   Take 1 tablet (10 mg) by mouth At Bedtime                                vitamin C-electrolytes 1000mg vitamin C super orange drink mix   Commonly known as:  EMERGEN-C   Take 1 packet by mouth daily Mix 1 packet in 4-6oz water and take once or twice daily or as directed.                                ZYRTEC ALLERGY 10 MG Caps   Take by mouth daily   Generic drug:  cetirizine HCl

## 2017-01-12 PROBLEM — K57.92 ACUTE DIVERTICULITIS: Status: ACTIVE | Noted: 2017-01-12

## 2017-01-12 LAB
ANION GAP SERPL CALCULATED.3IONS-SCNC: 5 MMOL/L (ref 3–14)
BUN SERPL-MCNC: 9 MG/DL (ref 7–30)
CALCIUM SERPL-MCNC: 8.2 MG/DL (ref 8.5–10.1)
CHLORIDE SERPL-SCNC: 107 MMOL/L (ref 94–109)
CO2 SERPL-SCNC: 30 MMOL/L (ref 20–32)
CREAT SERPL-MCNC: 0.79 MG/DL (ref 0.66–1.25)
ERYTHROCYTE [DISTWIDTH] IN BLOOD BY AUTOMATED COUNT: 13.1 % (ref 10–15)
GFR SERPL CREATININE-BSD FRML MDRD: ABNORMAL ML/MIN/1.7M2
GLUCOSE SERPL-MCNC: 95 MG/DL (ref 70–99)
HCT VFR BLD AUTO: 40.5 % (ref 40–53)
HGB BLD-MCNC: 13.3 G/DL (ref 13.3–17.7)
MCH RBC QN AUTO: 28.2 PG (ref 26.5–33)
MCHC RBC AUTO-ENTMCNC: 32.8 G/DL (ref 31.5–36.5)
MCV RBC AUTO: 86 FL (ref 78–100)
PLATELET # BLD AUTO: 217 10E9/L (ref 150–450)
POTASSIUM SERPL-SCNC: 3.8 MMOL/L (ref 3.4–5.3)
RBC # BLD AUTO: 4.72 10E12/L (ref 4.4–5.9)
SODIUM SERPL-SCNC: 142 MMOL/L (ref 133–144)
WBC # BLD AUTO: 4.8 10E9/L (ref 4–11)

## 2017-01-12 PROCEDURE — 80048 BASIC METABOLIC PNL TOTAL CA: CPT | Performed by: INTERNAL MEDICINE

## 2017-01-12 PROCEDURE — 85027 COMPLETE CBC AUTOMATED: CPT | Performed by: INTERNAL MEDICINE

## 2017-01-12 PROCEDURE — 25000125 ZZHC RX 250: Performed by: INTERNAL MEDICINE

## 2017-01-12 PROCEDURE — 99232 SBSQ HOSP IP/OBS MODERATE 35: CPT | Performed by: INTERNAL MEDICINE

## 2017-01-12 PROCEDURE — 12000000 ZZH R&B MED SURG/OB

## 2017-01-12 PROCEDURE — 36415 COLL VENOUS BLD VENIPUNCTURE: CPT | Performed by: INTERNAL MEDICINE

## 2017-01-12 PROCEDURE — 25800025 ZZH RX 258: Performed by: INTERNAL MEDICINE

## 2017-01-12 RX ORDER — HYDROMORPHONE HYDROCHLORIDE 1 MG/ML
.3-.5 INJECTION, SOLUTION INTRAMUSCULAR; INTRAVENOUS; SUBCUTANEOUS
Status: DISCONTINUED | OUTPATIENT
Start: 2017-01-12 | End: 2017-01-14 | Stop reason: HOSPADM

## 2017-01-12 RX ORDER — DEXTROSE MONOHYDRATE, SODIUM CHLORIDE, AND POTASSIUM CHLORIDE 50; 1.49; 4.5 G/1000ML; G/1000ML; G/1000ML
INJECTION, SOLUTION INTRAVENOUS CONTINUOUS
Status: DISCONTINUED | OUTPATIENT
Start: 2017-01-12 | End: 2017-01-13

## 2017-01-12 RX ORDER — FLUTICASONE PROPIONATE 50 MCG
1-2 SPRAY, SUSPENSION (ML) NASAL DAILY
Status: DISCONTINUED | OUTPATIENT
Start: 2017-01-12 | End: 2017-01-14 | Stop reason: HOSPADM

## 2017-01-12 RX ORDER — NALOXONE HYDROCHLORIDE 0.4 MG/ML
.1-.4 INJECTION, SOLUTION INTRAMUSCULAR; INTRAVENOUS; SUBCUTANEOUS
Status: DISCONTINUED | OUTPATIENT
Start: 2017-01-12 | End: 2017-01-14 | Stop reason: HOSPADM

## 2017-01-12 RX ORDER — ONDANSETRON 2 MG/ML
4 INJECTION INTRAMUSCULAR; INTRAVENOUS EVERY 6 HOURS PRN
Status: DISCONTINUED | OUTPATIENT
Start: 2017-01-12 | End: 2017-01-14 | Stop reason: HOSPADM

## 2017-01-12 RX ORDER — ACETAMINOPHEN 10 MG/ML
1000 INJECTION, SOLUTION INTRAVENOUS EVERY 6 HOURS PRN
Status: DISCONTINUED | OUTPATIENT
Start: 2017-01-12 | End: 2017-01-14 | Stop reason: HOSPADM

## 2017-01-12 RX ORDER — ONDANSETRON 4 MG/1
4 TABLET, ORALLY DISINTEGRATING ORAL EVERY 6 HOURS PRN
Status: DISCONTINUED | OUTPATIENT
Start: 2017-01-12 | End: 2017-01-14 | Stop reason: HOSPADM

## 2017-01-12 RX ORDER — CIPROFLOXACIN 2 MG/ML
400 INJECTION, SOLUTION INTRAVENOUS EVERY 12 HOURS
Status: DISCONTINUED | OUTPATIENT
Start: 2017-01-12 | End: 2017-01-13

## 2017-01-12 RX ORDER — ALBUTEROL SULFATE 90 UG/1
2 AEROSOL, METERED RESPIRATORY (INHALATION) EVERY 6 HOURS PRN
Status: DISCONTINUED | OUTPATIENT
Start: 2017-01-12 | End: 2017-01-14 | Stop reason: HOSPADM

## 2017-01-12 RX ADMIN — POTASSIUM CHLORIDE, DEXTROSE MONOHYDRATE AND SODIUM CHLORIDE: 150; 5; 450 INJECTION, SOLUTION INTRAVENOUS at 15:11

## 2017-01-12 RX ADMIN — METRONIDAZOLE 500 MG: 500 INJECTION, SOLUTION INTRAVENOUS at 13:50

## 2017-01-12 RX ADMIN — POTASSIUM CHLORIDE, DEXTROSE MONOHYDRATE AND SODIUM CHLORIDE: 150; 5; 450 INJECTION, SOLUTION INTRAVENOUS at 01:15

## 2017-01-12 RX ADMIN — METRONIDAZOLE 500 MG: 500 INJECTION, SOLUTION INTRAVENOUS at 01:49

## 2017-01-12 RX ADMIN — HYDROMORPHONE HYDROCHLORIDE 0.5 MG: 10 INJECTION, SOLUTION INTRAMUSCULAR; INTRAVENOUS; SUBCUTANEOUS at 08:19

## 2017-01-12 RX ADMIN — HYDROMORPHONE HYDROCHLORIDE 0.5 MG: 10 INJECTION, SOLUTION INTRAMUSCULAR; INTRAVENOUS; SUBCUTANEOUS at 05:47

## 2017-01-12 RX ADMIN — HYDROMORPHONE HYDROCHLORIDE 0.5 MG: 10 INJECTION, SOLUTION INTRAMUSCULAR; INTRAVENOUS; SUBCUTANEOUS at 13:44

## 2017-01-12 RX ADMIN — METRONIDAZOLE 500 MG: 500 INJECTION, SOLUTION INTRAVENOUS at 20:34

## 2017-01-12 RX ADMIN — CIPROFLOXACIN 400 MG: 2 INJECTION, SOLUTION INTRAVENOUS at 05:32

## 2017-01-12 RX ADMIN — CIPROFLOXACIN 400 MG: 2 INJECTION, SOLUTION INTRAVENOUS at 17:56

## 2017-01-12 RX ADMIN — HYDROMORPHONE HYDROCHLORIDE 0.5 MG: 10 INJECTION, SOLUTION INTRAMUSCULAR; INTRAVENOUS; SUBCUTANEOUS at 15:51

## 2017-01-12 RX ADMIN — HYDROMORPHONE HYDROCHLORIDE 0.5 MG: 10 INJECTION, SOLUTION INTRAMUSCULAR; INTRAVENOUS; SUBCUTANEOUS at 19:33

## 2017-01-12 RX ADMIN — METRONIDAZOLE 500 MG: 500 INJECTION, SOLUTION INTRAVENOUS at 08:20

## 2017-01-12 NOTE — PROGRESS NOTES
Cognition:  Pt alert and oriented.  Mobility:  Up independently in room tolerated well.  Denies dizziness.  Lungs:  CTA  Heart:  Denies chest pains or dizziness.  Urine:  WDL  Bowels: States entire abdomen painful, pain relief with dilaudid.  Receiving cipro and flagyl.  Edema:  None  Diet:  Tolerating clear liquid diet po.  Pain:  Dilaudid given for abdomen pains with relief.  Plan:  Pt safety, IV fluids, pain medication, antibiotics for diverticulitis, discharge to home.

## 2017-01-12 NOTE — CONSULTS
[facility]  Colon and Rectal Surgery Consult Note  Name: Phillip Nassar    MRN: [id number]  YOB: 1965    Age: 51 year old  Date of admission: 1/11/2017  Primary care provider: Albert Chapman     Requesting Physician:  Yuliana Philippe MD  Reason for consult:  52 yo male with acute diverticulitis.. ?colo-enteral fistula           History of Present Illness:   Phillip Nassar is a 51 year old male who presents with recurrent diverticulitis. He denies previous abdominal surgeries. He reports initial episode of diverticulitis was 3 years ago. At that time he had bloating and abdominal pain and was found to have sigmoid diverticulitis near his ileum on CT scan. He was discharged with antibiotics and improved. He was well until 09/2016 when he had his second episode. This was treated with antibiotics, without CT scan confirmation, and pt improved reportedly in 24 hours.   Most recently pt had onset of similar inferior umbilical abdominal pain similar to previous episodes, however this time without bloating. He had recently changed his diet and started taking Hydroxycut prior to onset. Then on 1/6/17 he presented to his PCP who prescribed him antibiotics. Pt had improvement of pain for a few days but starting yesterday he reports pain began to worsen. He obtained a CT scan and was sent to the ED. Throughout this he has continued to have bowel movements and pass gas. Upon initiating antibiotics he had more mushy stools, but these have now become more formed. He denies fever, chills, nausea, or vomiting.   He has had a colonoscopy within the last few years without abnormalities. He reports FMHx of multiple paternal family members with colon cancer.      Since admission he has remained afebrile and vitally stable. Labs unremarkable, no leukocytosis, WBC 4.8. UA negative. Abd/pelvis CT with sigmoid diverticulitis with inflammatory changes extending to involve the distal ileum. Early coloenteric  "fistulization cannot be excluded, no drainable fluid collection is identified. Inflammatory changes lie immediately superior to urinary bladder, which could explain urinary frequency as mentioned.             Past Medical History:     Past Medical History   Diagnosis Date     Allergic rhinitis, cause unspecified      Mild intermittent asthma      Diverticulitis              Past Surgical History:     Past Surgical History   Procedure Laterality Date     Colonoscopy  3/7/2014     Procedure: COLONOSCOPY;  Colonoscopy  ;  Surgeon: Gucci Martin MD;  Location:  GI               Social History:     Social History   Substance Use Topics     Smoking status: Never Smoker      Smokeless tobacco: Never Used     Alcohol Use: No             Family History:     Family History   Problem Relation Age of Onset     Cancer - colorectal Paternal Aunt      Cancer - colorectal Paternal Uncle      DIABETES Father              Allergies:     Allergies   Allergen Reactions     No Known Drug Allergies              Medications:       fluticasone  1-2 spray Both Nostrils Daily     ciprofloxacin  400 mg Intravenous Q12H     metroNIDAZOLE  500 mg Intravenous Q6H     [START ON 1/13/2017] pneumococcal vaccine  0.5 mL Intramuscular Prior to discharge             Review of Systems:   A comprehensive greater than 10 system review of systems was carried out.  Pertinent positives and negatives are noted above.  Otherwise negative for contributory info.            Physical Exam:   Blood pressure 114/74, temperature 98.4  F (36.9  C), temperature source Oral, resp. rate 18, height 1.727 m (5' 8\"), weight 100.5 kg (221 lb 9 oz), SpO2 93 %.    Intake/Output Summary (Last 24 hours) at 01/12/17 0736  Last data filed at 01/12/17 0703   Gross per 24 hour   Intake    799 ml   Output      0 ml   Net    799 ml     Exam:  GENERAL: Awake alert and oriented  LUNGS: Non labored breathing  ABD: Soft, mild tenderness to palpation of lower mid abdomen. " Nondistended. No rebound or guarding or peritoneal signs.   EXTREMITIES: warm without edema         Data:     Recent Results (from the past 24 hour(s))   CT Abdomen Pelvis w Contrast   Result Value    Radiologist flags (Urgent)     Sigmoid diverticulitis, inflammatory change extending    Narrative    CT ABDOMEN PELVIS W CONTRAST 1/11/2017 2:16 PM    History: diverticulitis and urinary frequency, Diverticulitis of large  intestine without perforation or abscess without bleeding, Frequency  of micturition    Comparison: 9/4/2015    Technique: Helical CT images were obtained from the dome of the liver  to the symphysis pubis with IV contrast.  Images were reconstructed in  multiple planes using multiple reconstruction algorithms.    Contrast: iopamidol (ISOVUE-370) solution 135 mL    Findings:  Lung bases: Small subpleural nodular opacity may represent a focus of  atelectasis (series 2 image 11), in the posterior lateral left lower  lobe. This appears excluded from the field-of-view on 9/24/2015.    Abdomen and pelvis: In the pelvis, there is inflammatory change and  wall thickening in the sigmoid colon, with extensive diverticulosis.  The inflammatory change extends to involve the distal ileum (best  visualized on series 2 image 141). No drainable fluid collection is  identified. The inflammatory change extends to the superior margin of  the urinary bladder. No adenopathy in the abdomen or pelvis. No  intra-abdominal free fluid or air. The remainder of the visualized  abdominal and pelvic viscera are unremarkable.    Subcutaneous soft tissues: Subcutaneous fat and musculature is within  normal limits. No inguinal lymphadenopathy.    Bones: Mild multilevel degenerative change of the visualized spine  without aggressive appearing bony lesion or acute fracture.      Impression    Impression:   1. Sigmoid diverticulitis, with inflammatory changes extending to  involve the distal ileum. Early coloenteric fistulization  cannot be  excluded. No drainable fluid collection is identified.   2. Inflammatory change lies immediately superior to the urinary  bladder, which could explain urinary frequency as questioned.    [Urgent result: Sigmoid diverticulitis, inflammatory change extending  to the ileum, questionable early fistulization]    Findings were relayed to Dr. Toney by Dr. Vallejo by telephone at  2:54 PM, 1/11/2017.           I have personally reviewed the examination and initial interpretation  and I agree with the findings.    JUSTIN SCHAFER MD         Recent Labs  Lab 01/12/17  0620 01/11/17  1851 01/06/17  1658   WBC 4.8 7.0 9.8   HGB 13.3 14.6 14.9   HCT 40.5 44.0 44.9   MCV 86 85 85    249 228       Recent Labs  Lab 01/12/17  0620 01/11/17  1851    139   POTASSIUM 3.8 3.9   CHLORIDE 107 103   CO2 30 29   ANIONGAP 5 7   GLC 95 82   BUN 9 10   CR 0.79 0.82   GFRESTIMATED >90Non  GFR Calc >90Non  GFR Calc   GFRESTBLACK >90African American GFR Calc >90African American GFR Calc   VINICIUS 8.2* 9.1     No results for input(s): LACT in the last 168 hours.    Recent Labs  Lab 01/11/17  1924 01/06/17  1657   COLOR Yellow Yellow   APPEARANCE Clear Clear   URINEGLC Negative Negative   URINEBILI Negative Negative   URINEKETONE Negative Negative   SG 1.036* 1.010   UBLD Negative Negative   URINEPH 7.0 5.5   PROTEIN Negative Negative   UROBILINOGEN  --  0.2   NITRITE Negative Negative   LEUKEST Negative Negative   RBCU <1 O - 2   WBCU <1 O - 2         Assessment and Plan:   Phillip Nassar is a 52 yo man who presents with recurrent diverticulitis. His first episode was 3 years ago which improved with antibiotics. Pt has had episode 4 months ago and current episode started 1/6/17, refractory to outpatient oral antibiotics.    1. No emergent surgical intervention indicated. Agree with non-operative management, would be hopeful to delay surgical intervention until acute inflammation has  resolved.  2. Advance to clear liquid diet  3. Continue antibiotics  4. Continue pain control   3. Will discuss surgical plan and indications with Dr. Urbina. Likely to delay surgery given active inflammation.   4. Will need colonoscopy in outpatient setting in 6-8 weeks      Tianna Yang PA-C  [facility]  [phone]

## 2017-01-12 NOTE — ED NOTES
Pt has abdominal pain since last week and he has hx of diverticulitis.  He has been on antibiotic with no improvement.  CT done today.  Pain is worse despite antibiotic.  Pain now involves whole abdomen.

## 2017-01-12 NOTE — ED PROVIDER NOTES
History     Chief Complaint:  Abdominal Pain    HPI   Phillip Nassar is a 51 year old male with a history of diverticulitis who presents with abdominal pain. His pain first began one week ago and feels consistent with previous bouts of diverticulitis. The patient did obtain antibiotics form PCP, though after multiple doses he did not feel any better. His pain first began suprapubically though seemed to radiate diffusely throughout the abdomen as time went on. He describes the pain as a pressure, and it does seem to be exacerbated by laying flat. He did have a CT today, results below. He denies any fevers, nausea, vomiting, issues with urination.     1/11/2017 CT Abdomen and Pelvis   1. Sigmoid diverticulitis, with inflammatory changes extending to  involve the distal ileum. Early coloenteric fistulization cannot be  excluded. No drainable fluid collection is identified.    2. Inflammatory change lies immediately superior to the urinary  bladder, which could explain urinary frequency as questioned.    Allergies:  No Known Drug Allergies     Medications:    amoxicillin-clavulanate (AUGMENTIN) 500-125 MG per tablet   cetirizine HCl (ZYRTEC ALLERGY) 10 MG CAPS   montelukast (SINGULAIR) 10 MG tablet   albuterol (ALBUTEROL) 108 (90 BASE) MCG/ACT inhaler   albuterol (PROAIR HFA, PROVENTIL HFA, VENTOLIN HFA) 108 (90 BASE) MCG/ACT inhaler   fluticasone (FLONASE) 50 MCG/ACT nasal spray   LACTOBACILLUS EXTRA STRENGTH CAPS   MOTRIN IB PO   Acetaminophen (TYLENOL PO)     Past Medical History:    Allergic rhinitis  Asthma  Diverticulitis     Past Surgical History:    Colonoscopy     Family History:    Diabetes      Social History:  The patient presented to the ED alone.   Smoking Status: Never smoker  Smokeless Tobacco: Never used  Alcohol Use: No   Marital Status:   [2]     Review of Systems   Constitutional: Negative for fever.   Gastrointestinal: Positive for abdominal pain. Negative for nausea, vomiting, diarrhea  "and blood in stool.   Genitourinary: Negative for dysuria and hematuria.   All other systems reviewed and are negative.    Physical Exam   Vitals:  Patient Vitals for the past 24 hrs:   BP Temp Temp src Heart Rate Resp SpO2 Height Weight   01/11/17 2015 130/81 mmHg - - - - 95 % - -   01/11/17 2000 133/84 mmHg - - - - 95 % - -   01/11/17 1945 146/85 mmHg - - - - - - -   01/11/17 1934 - - - - - 94 % - -   01/11/17 1933 138/86 mmHg - - - - - - -   01/11/17 1835 (!) 147/93 mmHg 98.1  F (36.7  C) Oral 84 20 94 % 1.727 m (5' 8\") 100.245 kg (221 lb)     Physical Exam  Constitutional: The patient is oriented to person, place, and time. Alert and cooperative.  HENT:   Right Ear: External ear normal.   Left Ear: External ear normal.   Nose: Nose normal.   Mouth/Throat: Moist mucous membranes.   Eyes: Conjunctivae, EOM and lids are normal.   Neck: Trachea normal. Normal range of motion. Neck supple.   Cardiovascular: Normal rate, regular rhythm, normal heart sounds, and intact distal pulses.    Pulmonary/Chest: Effort normal and breath sounds equal bilaterally. No crackles or wheezing.   Abdominal: Soft. Tenderness to palpation in the suprapubic region and LLQ. No rebound and no guarding.   Musculoskeletal: Normal range of motion.  No extremity tenderness or edema.  Neurological: Alert and Oriented. Moves all extremities equally.  Skin: Skin is dry. No rash noted.         Emergency Department Course     Laboratory:  Laboratory findings were communicated with the patient who voiced understanding of the findings.  CBC: AWNL. (WBC 7.0, HGB 14.6, )   BMP: AWNL (Creatinine 0.82)  UA: Specific Gravity: 1.036 (H) o/w normal    Interventions:  1921 Dilaudid 0.5 mg IV  1922 Normal Saline 1000 mL IV   1933 Cipro [id number] mg IV   2034 Flagyl 500 mg IV     Emergency Department Course:  Nursing notes and vitals reviewed.  I performed an exam of the patient as documented above.   IV was inserted and blood was drawn for laboratory " testing, results above.  The patient provided a urine sample here in the emergency department. This was sent for laboratory testing, findings above.   The patient was updated on the results of his tests results. We discussed further plan of care.  I discussed the treatment plan with the patient. They expressed understanding of this plan and consented to admission. I discussed the patient with Dr. Philippe, who will admit the patient to a monitored bed for further evaluation and treatment.     I personally reviewed the laboratory results with the Patient and answered all related questions prior to admission.    Impression & Plan      Medical Decision Making:  Phillip Nassar is a 51 year old male with a history of diverticulitis and SBO who presents to the emergency department today with abdominal pain. On presentation the patient is nontoxic appearing. He is mildly hypertensive but vitals are otherwise within normal limits and stable. On exam he is well appearing. He is alert, oriented, and neuro exam is non-focal. Cardiopulmonary exam is unremarkable. Abdomen is soft with tenderness in the suprapubic region and LLQ. There is no rebound or guarding. The rest of his exam is as mentioned above. Labs were obtained and are as above. Notably there is no leukocytosis. Of note, the patient was recently seen by his PMD for these symptoms nearly one week ago. He was treated for probable diverticulitis with Augmentin. Despite taking the antibiotic, he has had worsening symptoms, therefore he called his PMD today and a CT was obtained. He then presented to the ED for evaluation. The CT from today was reviewed and demonstrates sigmoid diverticulitis with inflammatory changes extending to the ileum. An early colo enteric fistulization cannot be excluded. There is no abscess. These results were discussed with the patient and he notes understanding. Given that the patient has been taking oral antibiotics and symptoms have worsened, I  do feel he has failed outpatient management and feel that IV antibiotics are indicated at this time. He was given a dose of Ciprofloxacin and Flagyl. He was discussed with the hospitalist who agreed to accept the patient on to his service. He was stable/improved at the time of admission.    Diagnosis:    ICD-10-CM    1. Diverticulitis of colon K57.32       Disposition:   Admission    Scribe Disclosure:  I, Lan Huynh, am serving as a scribe at 6:48 PM on 1/11/2017 to document services personally performed by Rita Ervin MD, based on my observations and the provider's statements to me.   1/11/2017   [facility] EMERGENCY DEPARTMENT        Rita Ervin MD  01/11/17 5542

## 2017-01-12 NOTE — PROGRESS NOTES
[facility]    Hospitalist Progress Note    Date of Service (when I saw the patient): 01/12/2017    Assessment and Plan    Patient is a 51-year-old male with a history of diverticulitis starting three years ago and last recent flareup in September 2016 who presents here with worsening abdominal pain secondary to diverticulitis despite outpatient trial of p.o. Augmentin.  CT of the scan does suggest possible coloenteric fistula.    Problem list  1. Acute diverticulitis with possible coloenteric fistula: At this time, his abdomen is benign.  He does have active bowel sounds and is passing flatus.      Inpatient admission for IV antibiotics as he has failed outpatient p.o. antibiotics.    Colorectal surgery consultation    Continue IV Cipro and Flagyl for now    Bowel rest , IV fluids, pain medications, and p.r.n. Antiemetics --> advance to clear liquids     ?underlying IBD? Although previous colonoscopies do not show signs of Chron's or inflammatory bowel disease     2. History of rhinitis  3. Mild intermittent asthma      DVT Prophylaxis: PCD    Code Status: Full Code    Disposition: Expected discharge in 2 days once abdominal pain improved and taking PO    Tauseef Ur Aaron    Interval History  Chart reviewed and patient seen, no acute issues overnight, feels well, pain is much improved, no nausea or vomiting, hoping to take some liquid diet, no other complaints voiced.     -Data reviewed today: I reviewed all new labs and imaging results over the last 24 hours. I personally reviewed no images or EKG's today.    Physical Exam  Temp: 98.3  F (36.8  C) Temp src: Oral BP: 132/81 mmHg   Heart Rate: 71 Resp: 20 SpO2: 94 % O2 Device: None (Room air)    Filed Vitals:    01/11/17 1835 01/12/17 0039   Weight: 100.245 kg (221 lb) 100.5 kg (221 lb 9 oz)     Vital Signs with Ranges  Temp:  [98  F (36.7  C)-98.9  F (37.2  C)] 98.3  F (36.8  C)  Pulse:  [82] 82  Heart Rate:  [61-84] 71  Resp:  [18-20] 20  BP:  (107-152)/() 132/81 mmHg  SpO2:  [93 %-98 %] 94 %         Constitutional: Awake, alert, cooperative, no apparent distress   Respiratory: Clear to auscultation bilaterally, no crackles or wheezing noted, symmetric chest rise bilaterally   Cardiovascular: Regular rate and rhythm, normal S1 and S2, no loud murmur, rubs or gallops noted   Abdomen: Bowel sounds present, soft, mildly distended, mild tenderness in lower abdomen, no guarding is noted   Skin: No exanthems noted on exposed areas, no cyanosis, dry to touch   Neuro: Alert and oriented x3, no focal weakness or numbness   Extremities: No pitting edema, normal range of motion, skin is warm to touch with signs of adequate peripheral perfusion    Psychiatric: Calm, not agitated, no active hallucinations             Medications    dextrose 5% and 0.45% NaCl + KCl 20 mEq/L 125 mL/hr at 01/12/17 0115       fluticasone  1-2 spray Both Nostrils Daily     ciprofloxacin  400 mg Intravenous Q12H     metroNIDAZOLE  500 mg Intravenous Q6H     [START ON 1/13/2017] pneumococcal vaccine  0.5 mL Intramuscular Prior to discharge       Data  All new lab data and imaging results from today have been reviewed       Recent Labs  Lab 01/12/17  0620 01/11/17  1851 01/06/17  1658   WBC 4.8 7.0 9.8   HGB 13.3 14.6 14.9   MCV 86 85 85    249 228    139  --    POTASSIUM 3.8 3.9  --    CHLORIDE 107 103  --    CO2 30 29  --    BUN 9 10  --    CR 0.79 0.82  --    ANIONGAP 5 7  --    VINICIUS 8.2* 9.1  --    GLC 95 82  --        Recent Results (from the past 24 hour(s))   CT Abdomen Pelvis w Contrast   Result Value    Radiologist flags (Urgent)     Sigmoid diverticulitis, inflammatory change extending    Narrative    CT ABDOMEN PELVIS W CONTRAST 1/11/2017 2:16 PM    History: diverticulitis and urinary frequency, Diverticulitis of large  intestine without perforation or abscess without bleeding, Frequency  of micturition    Comparison: 9/4/2015    Technique: Helical CT images were  obtained from the dome of the liver  to the symphysis pubis with IV contrast.  Images were reconstructed in  multiple planes using multiple reconstruction algorithms.    Contrast: iopamidol (ISOVUE-370) solution 135 mL    Findings:  Lung bases: Small subpleural nodular opacity may represent a focus of  atelectasis (series 2 image 11), in the posterior lateral left lower  lobe. This appears excluded from the field-of-view on 9/24/2015.    Abdomen and pelvis: In the pelvis, there is inflammatory change and  wall thickening in the sigmoid colon, with extensive diverticulosis.  The inflammatory change extends to involve the distal ileum (best  visualized on series 2 image 141). No drainable fluid collection is  identified. The inflammatory change extends to the superior margin of  the urinary bladder. No adenopathy in the abdomen or pelvis. No  intra-abdominal free fluid or air. The remainder of the visualized  abdominal and pelvic viscera are unremarkable.    Subcutaneous soft tissues: Subcutaneous fat and musculature is within  normal limits. No inguinal lymphadenopathy.    Bones: Mild multilevel degenerative change of the visualized spine  without aggressive appearing bony lesion or acute fracture.      Impression    Impression:   1. Sigmoid diverticulitis, with inflammatory changes extending to  involve the distal ileum. Early coloenteric fistulization cannot be  excluded. No drainable fluid collection is identified.   2. Inflammatory change lies immediately superior to the urinary  bladder, which could explain urinary frequency as questioned.    [Urgent result: Sigmoid diverticulitis, inflammatory change extending  to the ileum, questionable early fistulization]    Findings were relayed to Dr. Toney by Dr. Vallejo by telephone at  2:54 PM, 1/11/2017.           I have personally reviewed the examination and initial interpretation  and I agree with the findings.    JUSTIN SCHAFER MD

## 2017-01-12 NOTE — PLAN OF CARE
Problem: Goal Outcome Summary  Goal: Goal Outcome Summary  A/Ox4, VSS, afebrile. LS clear. BS +, passing flatus, abdominal cramping and pressure pain when passing gas. Given 0.5mg dilaudid once this shift. NPO. IV Abx for diverticulitis, Cipro and Flagyl. Up independently in room. Plan for colorectal surgery consult today.

## 2017-01-12 NOTE — PHARMACY-ADMISSION MEDICATION HISTORY
Admission medication history interview status for this patient is complete. See [facility] admission navigator for allergy information, prior to admission medications and immunization status.     Medication history interview source(s):Patient  Medication history resources (including written lists, pill bottles, clinic record): written list  Primary pharmacy:     Changes made to PTA medication list:  Added: emergen-C  Deleted: motrin, lactobacillus, acetaminophen  Changed: none    Actions taken by pharmacist (provider contacted, etc):None     Additional medication history information: Patient states he only takes Singulair and Flonase during the summer months when his allergies flare up. He also mentioned he received a prescription for tylenol with codeine but could not remember the strength.  He was only given #9 pills and has #2 left.    Medication reconciliation/reorder completed by provider prior to medication history? No      Prior to Admission medications    Medication Sig Last Dose Taking? Auth Provider   vitamin C-electrolytes (EMERGEN-C) 1000mg vitamin C super orange drink mix Take 1 packet by mouth daily Mix 1 packet in 4-6oz water and take once or twice daily or as directed. 1/11/2017 at Unknown time Yes Unknown, Entered By History   amoxicillin-clavulanate (AUGMENTIN) 500-125 MG per tablet Take 1 tablet by mouth 3 times daily 1/11/2017 at noon Yes Christopher Hernandez PA-C   cetirizine HCl (ZYRTEC ALLERGY) 10 MG CAPS Take by mouth daily  1/11/2017 at Unknown time Yes Reported, Patient   montelukast (SINGULAIR) 10 MG tablet Take 1 tablet (10 mg) by mouth At Bedtime  Yes Albert Chapman MD   albuterol (PROAIR HFA, PROVENTIL HFA, VENTOLIN HFA) 108 (90 BASE) MCG/ACT inhaler Inhale 2 puffs into the lungs every 6 hours as needed for shortness of breath / dyspnea More than a month at Unknown time  Albert Chapman MD   fluticasone (FLONASE) 50 MCG/ACT nasal spray Spray 1-2 sprays into both nostrils daily    Albert Chapman MD

## 2017-01-12 NOTE — H&P
[facility]  Hospitalist Admission Note  January 11, 2017  Name: Phillip Nassar    MRN: [id number]  YOB: 1965    Age: 51 year old  Date of admission: 1/11/2017  Primary care provider: Albert Chapman      Summary:  Patient is a 51-year-old male with a history of diverticulitis starting three years ago and last recent flareup in September 2016 who presents here with worsening abdominal pain secondary to diverticulitis despite outpatient trial of p.o. Augmentin.  CT of the scan does suggest possible coloenteric fistula.    Problem list  1. Acute diverticulitis with possible coloenteric fistula: At this time, his abdomen is benign.  He does have active bowel sounds and is passing flatus.  2. History of rhinitis    Plan:    Inpatient admission for IV antibiotics as he's failed outpatient p.o. antibiotics.    We'll request colorectal surgery consultation to discuss whether or not the patient may need surgery now or at a later point    Continue IV Cipro and Flagyl for now    Bowel rest with n.p.o., IV fluids, pain medications, and p.r.n. antiemetics    -Additional workup plan which will include colorectal surgery consultation    All lab work and imaging data independently reviewed by myself    Prophylaxis;  Ambulation.   Discharge: Home when able    Chief Complaint:   Abdominal pain    HPI  Phillip Nassar is a 51 year old male with a history of diverticulitis who presents with abdominal pain. His pain first began one week ago and feels consistent with previous bouts of diverticulitis. The patient did obtain antibiotics form PCP, though after multiple doses he did not feel any better. His pain first began suprapubically though seemed to radiate diffusely throughout the abdomen as time went on. He describes the pain as a pressure, and it does seem to be exacerbated by laying flat. He did have a CT today, results below. He denies any fevers, nausea, vomiting, issues with  "urination.     1/11/2017 CT Abdomen and Pelvis   1. Sigmoid diverticulitis, with inflammatory changes extending to  involve the distal ileum. Early coloenteric fistulization cannot be  excluded. No drainable fluid collection is identified.    2. Inflammatory change lies immediately superior to the urinary  bladder, which could explain urinary frequency as questioned.  I did discuss the case in detail with the ED physician.     Past Medical History:     Past Medical History   Diagnosis Date     Allergic rhinitis, cause unspecified      Mild intermittent asthma      Diverticulitis      Past Surgical History:     Past Surgical History   Procedure Laterality Date     Colonoscopy  3/7/2014     Procedure: COLONOSCOPY;  Colonoscopy  ;  Surgeon: Gucci Martin MD;  Location:  GI     Social History:     Social History   Substance Use Topics     Smoking status: Never Smoker      Smokeless tobacco: Never Used     Alcohol Use: No     Family History:  Family history reviewed. NO pertinent family history     Allergies:     Allergies   Allergen Reactions     No Known Drug Allergies      Medications:     (Not in a hospital admission)    Review of Systems:   A Comprehensive greater than 10 system review of systems was carried out.  Pertinent positives and negatives are noted above.  Otherwise negative for contributory information.        Physical Exam:  Blood pressure 130/72, temperature 98.1  F (36.7  C), temperature source Oral, resp. rate 20, height 1.727 m (5' 8\"), weight 100.245 kg (221 lb), SpO2 93 %.  Gen: Pleasant in no acute distress.  HEENT: NCAT. EOMI. PERRL.  Neck: Normal inspection. No bruit, JVD or thyromegaly.  Lungs: Normal respiratory effort.Clear to auscultation bilaterally with no crackles or wheezes.  Card: N s1s2. RRR. No M/R/G.  Peripheral pulses present and symmetric.   Abd: Soft tenderness LLQ around umbilicus. ND. No mass. Normal bowel sounds.  Skin: No rash. Warm to the touch  Extr: No edema. CMS " intact  Psychiatric: Patient alert oriented ×3.  Normal affect  Neurologic: Cranial nerves II-XII are intact.  Sensation normal.  Motor strength 5/5    Data:       Recent Labs  Lab 01/11/17  1851 01/06/17  1658   WBC 7.0 9.8   HGB 14.6 14.9   HCT 44.0 44.9   MCV 85 85    228       Recent Labs  Lab 01/11/17  1851      POTASSIUM 3.9   CHLORIDE 103   CO2 29   ANIONGAP 7   GLC 82   BUN 10   CR 0.82   GFRESTIMATED >90Non  GFR Calc   GFRESTBLACK >90African American GFR Calc   VINICIUS 9.1       Imaging:   Recent Results (from the past 24 hour(s))   CT Abdomen Pelvis w Contrast   Result Value    Radiologist flags (Urgent)     Sigmoid diverticulitis, inflammatory change extending    Narrative    CT ABDOMEN PELVIS W CONTRAST 1/11/2017 2:16 PM    History: diverticulitis and urinary frequency, Diverticulitis of large  intestine without perforation or abscess without bleeding, Frequency  of micturition    Comparison: 9/4/2015    Technique: Helical CT images were obtained from the dome of the liver  to the symphysis pubis with IV contrast.  Images were reconstructed in  multiple planes using multiple reconstruction algorithms.    Contrast: iopamidol (ISOVUE-370) solution 135 mL    Findings:  Lung bases: Small subpleural nodular opacity may represent a focus of  atelectasis (series 2 image 11), in the posterior lateral left lower  lobe. This appears excluded from the field-of-view on 9/24/2015.    Abdomen and pelvis: In the pelvis, there is inflammatory change and  wall thickening in the sigmoid colon, with extensive diverticulosis.  The inflammatory change extends to involve the distal ileum (best  visualized on series 2 image 141). No drainable fluid collection is  identified. The inflammatory change extends to the superior margin of  the urinary bladder. No adenopathy in the abdomen or pelvis. No  intra-abdominal free fluid or air. The remainder of the visualized  abdominal and pelvic viscera are  unremarkable.    Subcutaneous soft tissues: Subcutaneous fat and musculature is within  normal limits. No inguinal lymphadenopathy.    Bones: Mild multilevel degenerative change of the visualized spine  without aggressive appearing bony lesion or acute fracture.      Impression    Impression:   1. Sigmoid diverticulitis, with inflammatory changes extending to  involve the distal ileum. Early coloenteric fistulization cannot be  excluded. No drainable fluid collection is identified.   2. Inflammatory change lies immediately superior to the urinary  bladder, which could explain urinary frequency as questioned.    [Urgent result: Sigmoid diverticulitis, inflammatory change extending  to the ileum, questionable early fistulization]    Findings were relayed to Dr. Toney by Dr. Vallejo by telephone at  2:54 PM, 1/11/2017.           I have personally reviewed the examination and initial interpretation  and I agree with the findings.    MD Chary PEREZ MD Pager [phone]

## 2017-01-13 LAB
C DIFF TOX B STL QL: NORMAL
SPECIMEN SOURCE: NORMAL

## 2017-01-13 PROCEDURE — 99232 SBSQ HOSP IP/OBS MODERATE 35: CPT | Performed by: HOSPITALIST

## 2017-01-13 PROCEDURE — 87493 C DIFF AMPLIFIED PROBE: CPT | Performed by: HOSPITALIST

## 2017-01-13 PROCEDURE — 25000125 ZZHC RX 250: Performed by: INTERNAL MEDICINE

## 2017-01-13 PROCEDURE — 25000132 ZZH RX MED GY IP 250 OP 250 PS 637: Performed by: INTERNAL MEDICINE

## 2017-01-13 PROCEDURE — 12000000 ZZH R&B MED SURG/OB

## 2017-01-13 PROCEDURE — 25800025 ZZH RX 258: Performed by: INTERNAL MEDICINE

## 2017-01-13 RX ORDER — METRONIDAZOLE 250 MG/1
250 TABLET ORAL EVERY 8 HOURS SCHEDULED
Status: DISCONTINUED | OUTPATIENT
Start: 2017-01-13 | End: 2017-01-14 | Stop reason: HOSPADM

## 2017-01-13 RX ORDER — CIPROFLOXACIN 500 MG/1
500 TABLET, FILM COATED ORAL EVERY 12 HOURS SCHEDULED
Status: DISCONTINUED | OUTPATIENT
Start: 2017-01-14 | End: 2017-01-14 | Stop reason: HOSPADM

## 2017-01-13 RX ADMIN — HYDROMORPHONE HYDROCHLORIDE 0.5 MG: 10 INJECTION, SOLUTION INTRAMUSCULAR; INTRAVENOUS; SUBCUTANEOUS at 05:58

## 2017-01-13 RX ADMIN — HYDROMORPHONE HYDROCHLORIDE 0.5 MG: 10 INJECTION, SOLUTION INTRAMUSCULAR; INTRAVENOUS; SUBCUTANEOUS at 21:50

## 2017-01-13 RX ADMIN — HYDROMORPHONE HYDROCHLORIDE 0.5 MG: 10 INJECTION, SOLUTION INTRAMUSCULAR; INTRAVENOUS; SUBCUTANEOUS at 02:25

## 2017-01-13 RX ADMIN — METRONIDAZOLE 250 MG: 250 TABLET ORAL at 21:46

## 2017-01-13 RX ADMIN — METRONIDAZOLE 500 MG: 500 INJECTION, SOLUTION INTRAVENOUS at 07:56

## 2017-01-13 RX ADMIN — HYDROMORPHONE HYDROCHLORIDE 0.5 MG: 10 INJECTION, SOLUTION INTRAMUSCULAR; INTRAVENOUS; SUBCUTANEOUS at 13:52

## 2017-01-13 RX ADMIN — POTASSIUM CHLORIDE, DEXTROSE MONOHYDRATE AND SODIUM CHLORIDE: 150; 5; 450 INJECTION, SOLUTION INTRAVENOUS at 02:30

## 2017-01-13 RX ADMIN — CIPROFLOXACIN 400 MG: 2 INJECTION, SOLUTION INTRAVENOUS at 17:30

## 2017-01-13 RX ADMIN — METRONIDAZOLE 500 MG: 500 INJECTION, SOLUTION INTRAVENOUS at 14:01

## 2017-01-13 RX ADMIN — CIPROFLOXACIN 400 MG: 2 INJECTION, SOLUTION INTRAVENOUS at 05:52

## 2017-01-13 RX ADMIN — METRONIDAZOLE 500 MG: 500 INJECTION, SOLUTION INTRAVENOUS at 02:27

## 2017-01-13 RX ADMIN — HYDROMORPHONE HYDROCHLORIDE 0.5 MG: 10 INJECTION, SOLUTION INTRAMUSCULAR; INTRAVENOUS; SUBCUTANEOUS at 00:12

## 2017-01-13 ASSESSMENT — PAIN DESCRIPTION - DESCRIPTORS
DESCRIPTORS: ACHING

## 2017-01-13 NOTE — PLAN OF CARE
Problem: Goal Outcome Summary  Goal: Goal Outcome Summary  Outcome: No Change  Pt. AxOx4, up independently, c/o pain to abdomen and given dilaudid IV and pain decreased to an acceptable pain level for pt., IVF infusing, tolerating clear liquids, Lung sounds clear, room air sat's at 94-95%. Bowel sounds active, passing flatus, BMx2 tonight. Plan: IV abx-(Cipro, Flagyl), clear liquids, IVF, pain management. Pt. Denies any needs at this time. Pt. To be transferred to [facility] with all belongings.

## 2017-01-13 NOTE — PROGRESS NOTES
[facility]  Hospitalist Progress Note    Date of Service (when I saw the patient): 01/13/2017    Assessment and Plan  Patient is a 51-year-old male with a history of diverticulitis starting three years ago and last recent flareup in September 2016 who presents here with worsening abdominal pain secondary to diverticulitis despite outpatient trial of p.o. Augmentin.  CT of the scan does suggest possible coloenteric fistula.    Problem list  1. 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  --Continue IV antibiotics again today as he has failed outpatient p.o. Antibiotics PTA.  --Colorectal surgery consultation appreciated  --Continue IV Cipro and Flagyl today->switch to PO tomorrow  (advised to avoid alcohol with flagyl)  --Bowel rest , IV fluids, pain medications, and p.r.n. Antiemetics --> advanced to full liq per surgery  --?underlying IBD? Although previous colonoscopies do not show signs of Chron's or inflammatory bowel disease. Will need repeat in 6-8 weeks      2. History of rhinitis: stable  3. Mild intermittent asthma: stable, no e/o exacerbation   4. Family h/o colon cancer: in maternal aunt at early age. Pt has personal h/o polyps -last colonoscopy was in 2014 however will need repeat colonoscopy in 6-8 weeks to r/o IBD/polyps       DVT Prophylaxis: PCD, encourage ambulation   Code Status: Full Code  Disposition: Expected discharge tomorrow- once abdominal pain improved and taking PO    Emily Mcknight    Interval History  Chart reviewed and patient seen, no acute issues overnight, feels well, pain is much improved al though  in LLQ, no nausea or vomiting, wants to eat.     -Data reviewed today: I reviewed all new labs and imaging results over the last 24 hours. I personally reviewed no images or EKG's today.    Physical Exam  Temp: 97.2  F (36.2   C) Temp src: Oral BP: 114/72 mmHg Pulse: 67 Heart Rate: 65 Resp: 18 SpO2: 95 % O2 Device: None (Room air)    Filed Vitals:    01/11/17 1835 01/12/17 0039   Weight: 100.245 kg (221 lb) 100.5 kg (221 lb 9 oz)     Vital Signs with Ranges  Temp:  [96.6  F (35.9  C)-99.2  F (37.3  C)] 97.2  F (36.2  C)  Pulse:  [67] 67  Heart Rate:  [65-76] 65  Resp:  [18-20] 18  BP: (114-135)/(68-86) 114/72 mmHg  SpO2:  [94 %-96 %] 95 %  I/O last 3 completed shifts:  In: 2807 [I.V.:2807]  Out: -       Constitutional: Awake, alert, cooperative, no apparent distress   Respiratory: Clear to auscultation bilaterally, no crackles or wheezing noted, symmetric chest rise bilaterally   Cardiovascular: Regular rate and rhythm, normal S1 and S2, no loud murmur, rubs or gallops noted   Abdomen: Bowel sounds present, soft, mildly distended, mild tenderness in lower abdomen, no guarding is noted   Skin: No exanthems noted on exposed areas, no cyanosis, dry to touch   Neuro: Alert and oriented x3, no focal weakness or numbness   Extremities: No pitting edema, normal range of motion, skin is warm to touch with signs of adequate peripheral perfusion    Psychiatric: Calm, not agitated, no active hallucinations             Medications       fluticasone  1-2 spray Both Nostrils Daily     ciprofloxacin  400 mg Intravenous Q12H     metroNIDAZOLE  500 mg Intravenous Q6H     pneumococcal vaccine  0.5 mL Intramuscular Prior to discharge       Data  All new lab data and imaging results from today have been reviewed       Recent Labs  Lab 01/12/17  0620 01/11/17  1851 01/06/17  1658   WBC 4.8 7.0 9.8   HGB 13.3 14.6 14.9   MCV 86 85 85    249 228    139  --    POTASSIUM 3.8 3.9  --    CHLORIDE 107 103  --    CO2 30 29  --    BUN 9 10  --    CR 0.79 0.82  --    ANIONGAP 5 7  --    VINICIUS 8.2* 9.1  --    GLC 95 82  --        No results found for this or any previous visit (from the past 24 hour(s)).

## 2017-01-13 NOTE — PLAN OF CARE
Problem: Goal Outcome Summary  Goal: Goal Outcome Summary  Outcome: No Change  Pt transferred from 3rd floor w/ acute divertic. VSS. Pain controlled w/ IV dilaudid x2. Abd soft/round, passing gas, active bowel sounds. Denied nausea. Clear liquid diet, tolerating well. Up ind. IV cipro and flagyl for abx treatment. Rested comfortably.

## 2017-01-13 NOTE — PLAN OF CARE
Problem: Goal Outcome Summary  Goal: Goal Outcome Summary  Pt alert and oriented. Up ind. Showered. IV dilaudid for pain x1. Reports 6 loose stools today. Tolerating full liquids.

## 2017-01-13 NOTE — PROGRESS NOTES
COLON & RECTAL SURGERY  PROGRESS NOTE    January 13, 2017  Hospital Day # 3    SUBJECTIVE:  Pt doing well, feeling improved compared to yesterday. Tolerated clear liquid diet.     OBJECTIVE:  Temp:  [96.6  F (35.9  C)-99.2  F (37.3  C)] 97.2  F (36.2  C)  Pulse:  [67] 67  Heart Rate:  [65-76] 65  Resp:  [18-20] 18  BP: (114-135)/(68-86) 114/72 mmHg  SpO2:  [94 %-96 %] 95 %    Intake/Output Summary (Last 24 hours) at 01/13/17 0901  Last data filed at 01/13/17 0555   Gross per 24 hour   Intake   2008 ml   Output      0 ml   Net   2008 ml       GENERAL:  Awake, alert, no acute distress, lying in bed  HEAD: Nomocephalic atraumatic  SCLERA: anicteric  EXTREMITIES: warm and well perfused  ABDOMEN:  Soft, tenderness to palpation of lower mid abdomen, improved from yesterday, non-distended, no rebound or guarding     LABS:  WBC      4.8   1/12/2017  HGB     13.3   1/12/2017  HCT     40.5   1/12/2017  PLT      217   1/12/2017  Last Basic Metabolic Panel:  NA      142   1/12/2017   POTASSIUM      3.8   1/12/2017  CHLORIDE      107   1/12/2017  VINICIUS      8.2   1/12/2017  CO2       30   1/12/2017  BUN        9   1/12/2017  CR     0.79   1/12/2017  GLC       95   1/12/2017    ASSESSMENT/PLAN: 52 yo man with recurrent diverticulitis, current CT confirmed sigmoid diverticulitis with inflammatory changes with adjacent terminal ileum.   1. No urgent surgery indicated, continue with non-operative management  2. Advance to full liquid diet  3. Continue IV antibiotics  4. Encourage OOB, ambulate  5. Will require colonoscopy follow-up as outpatient in 6-8 weeks.  6. Dispo: likely d/c home in 1-2 days    Tianna Yang PA-C  [facility]  Phone: [phone]     Colon and Rectal Surgery Attending Note    Patient seen and examined independently.  Agree with above assessment and plan.  Feeling better, small loose stool.  No fever  abd soft with mild SP tenderness.  Plan   Continue IV abx for today  Fulls okay  Will plan  for colonoscopy in 6 weeks.    Destiny Burks MD  [facility]  Office Phone # [phone]

## 2017-01-14 VITALS
DIASTOLIC BLOOD PRESSURE: 69 MMHG | SYSTOLIC BLOOD PRESSURE: 111 MMHG | RESPIRATION RATE: 18 BRPM | BODY MASS INDEX: 33.58 KG/M2 | WEIGHT: 221.56 LBS | TEMPERATURE: 97.4 F | HEART RATE: 67 BPM | OXYGEN SATURATION: 95 % | HEIGHT: 68 IN

## 2017-01-14 PROCEDURE — 25000125 ZZHC RX 250: Performed by: INTERNAL MEDICINE

## 2017-01-14 PROCEDURE — 99239 HOSP IP/OBS DSCHRG MGMT >30: CPT | Performed by: INTERNAL MEDICINE

## 2017-01-14 PROCEDURE — 25000132 ZZH RX MED GY IP 250 OP 250 PS 637: Performed by: INTERNAL MEDICINE

## 2017-01-14 RX ORDER — HYDROCODONE BITARTRATE AND ACETAMINOPHEN 5; 325 MG/1; MG/1
1-2 TABLET ORAL EVERY 4 HOURS PRN
Status: DISCONTINUED | OUTPATIENT
Start: 2017-01-14 | End: 2017-01-14 | Stop reason: HOSPADM

## 2017-01-14 RX ORDER — HYDROCODONE BITARTRATE AND ACETAMINOPHEN 5; 325 MG/1; MG/1
1 TABLET ORAL EVERY 6 HOURS PRN
Qty: 15 TABLET | Refills: 0 | Status: SHIPPED | OUTPATIENT
Start: 2017-01-14 | End: 2017-05-22

## 2017-01-14 RX ORDER — LACTOBACILLUS RHAMNOSUS GG 10B CELL
1 CAPSULE ORAL 2 TIMES DAILY
Qty: 20 CAPSULE | Refills: 0 | Status: SHIPPED | OUTPATIENT
Start: 2017-01-14 | End: 2017-01-24

## 2017-01-14 RX ORDER — METRONIDAZOLE 500 MG/1
500 TABLET ORAL 3 TIMES DAILY
Qty: 30 TABLET | Refills: 0 | Status: SHIPPED | OUTPATIENT
Start: 2017-01-14 | End: 2017-01-24

## 2017-01-14 RX ORDER — CIPROFLOXACIN 500 MG/1
500 TABLET, FILM COATED ORAL 2 TIMES DAILY
Qty: 20 TABLET | Refills: 0 | Status: SHIPPED | OUTPATIENT
Start: 2017-01-14 | End: 2017-01-24

## 2017-01-14 RX ADMIN — METRONIDAZOLE 250 MG: 250 TABLET ORAL at 05:30

## 2017-01-14 RX ADMIN — HYDROMORPHONE HYDROCHLORIDE 0.5 MG: 10 INJECTION, SOLUTION INTRAMUSCULAR; INTRAVENOUS; SUBCUTANEOUS at 04:46

## 2017-01-14 RX ADMIN — HYDROCODONE BITARTATE AND ACETAMINOPHEN 2 TABLET: 5; 325 TABLET ORAL at 08:40

## 2017-01-14 RX ADMIN — METRONIDAZOLE 250 MG: 250 TABLET ORAL at 13:35

## 2017-01-14 RX ADMIN — HYDROMORPHONE HYDROCHLORIDE 0.5 MG: 10 INJECTION, SOLUTION INTRAMUSCULAR; INTRAVENOUS; SUBCUTANEOUS at 01:55

## 2017-01-14 RX ADMIN — CIPROFLOXACIN HYDROCHLORIDE 500 MG: 500 TABLET, FILM COATED ORAL at 08:40

## 2017-01-14 ASSESSMENT — PAIN DESCRIPTION - DESCRIPTORS
DESCRIPTORS: ACHING
DESCRIPTORS: ACHING;PRESSURE

## 2017-01-14 NOTE — PROGRESS NOTES
Discharge instructions given to patient, iv removed, filled meds given for home use. Patient waiting for ride and will walk to vehicle.

## 2017-01-14 NOTE — CONSULTS
"BRIEF NUTRITION ASSESSMENT      REASON FOR ASSESSMENT:  LOS    NUTRITION HISTORY:  NA    CURRENT DIET AND INTAKE:  Diet:  Low fiber               Patient reports good appetite/intake during admission.     ANTHROPOMETRICS:  Height: 5'8\"  Weight: 100.5 kg  BMI: 33.70 kg/m2  IBW:  70 kg   Weight Status: Obesity Grade I BMI 30-34.9  %IBW: 144%  Weight History: The data below suggests that patient's weight has trended up over the past six months.   Wt Readings from Last 10 Encounters:   01/12/17 100.5 kg (221 lb 9 oz)   01/11/17 101.606 kg (224 lb)   01/06/17 101.833 kg (224 lb 8 oz)   09/09/16 99.565 kg (219 lb 8 oz)   07/05/16 99.338 kg (219 lb)   01/04/16 96.163 kg (212 lb)   09/04/15 97.387 kg (214 lb 11.2 oz)   06/18/15 97.387 kg (214 lb 11.2 oz)   04/14/15 94.802 kg (209 lb)   03/28/15 94.802 kg (209 lb)   ]    LABS:  Labs noted    MALNUTRITION:  Patient does not meet two of the following criteria necessary for diagnosing malnutrition: significant weight loss, reduced intake, subcutaneous fat loss, muscle loss or fluid retention    NUTRITION INTERVENTION:  Nutrition Diagnosis:  No nutrition diagnosis at this time.    Implementation:  Nutrition Education:  Provided nutrition education on low fiber diet with gradual transition to high fiber diet     FOLLOW UP/MONITORING:   Will re-evaluate in 7 days, or sooner, if re-consulted.    Beatrice Umana RD, LD  Clinical Dietitian  3rd Floor/ICU Pager: [phone]  All Other Floors Pager: [phone]  Weekend/Holiday Pager: [phone]            "

## 2017-01-14 NOTE — PLAN OF CARE
"Problem: Goal Outcome Summary  Goal: Goal Outcome Summary  VSS, pt up independently, IV dilaudid x1, tolerating fulls, BS A&Ax4, passing gas, reports over 6 stools this shift, stool sent, handout on \"preventing the spread of infection\" given and explained to pt, LS clear, room air, S/L, abx switched to PO this shift after c/o itching, irritation, burning and pain, will follow outpt for colonoscopy, likely to dc tomorrow, will continue to monitor and provide supportive cares.         "

## 2017-01-14 NOTE — PROGRESS NOTES
"Colon & Rectal Surgery Progress Note             Interval History:   Feeling better, up walking well, no nausea.   Loose BM's Cdif neg                Medications:   I have reviewed this patient's current medications               Physical Exam:   Blood pressure 111/69, pulse 67, temperature 97.4  F (36.3  C), temperature source Oral, resp. rate 18, height 1.727 m (5' 8\"), weight 100.5 kg (221 lb 9 oz), SpO2 95 %.    Intake/Output Summary (Last 24 hours) at 01/14/17 1055  Last data filed at 01/14/17 0600   Gross per 24 hour   Intake   1244 ml   Output      0 ml   Net   1244 ml     GEN:  alert  ABD:  Soft mild SP tenderness         Data:        NA      142   1/12/2017 CHLORIDE      107   1/12/2017 BUN        9   1/12/2017   POTASSIUM      3.8   1/12/2017 CO2       30   1/12/2017 CR     0.79   1/12/2017  CR     0.82   1/11/2017     Lab Results   Component Value Date    HGB 13.3 01/12/2017    HGB 14.6 01/11/2017     Lab Results   Component Value Date     01/12/2017     01/11/2017     Lab Results   Component Value Date    WBC 4.8 01/12/2017    WBC 7.0 01/11/2017            Assessment and Plan:   Doing well. Start low fiber diet.  Plan for DC on cipro flagyl for 10 days  Follow up with me for colonoscopy in 6 weeks.  Likely DC later today.     Destiny Burks MD  [facility]  Office Phone # [phone]      "

## 2017-01-14 NOTE — PROGRESS NOTES
Cross cover note:    Notified of difficulties with IV access.  Will change to PO cipro/flagyl tonight.  Per rounder's note was planning to make this change in the morning anyway.    Ko Kerr MD

## 2017-01-14 NOTE — PLAN OF CARE
Problem: Goal Outcome Summary  Goal: Goal Outcome Summary  Outcome: No Change  Vital signs stable.   C/O abdominal pain 3/10 IV Dilaudid x 2.   Up independently. Voiding without difficulty. Loose stool x 1 this shift. C-diff negative.   New PIV in right hand saline locked.   PO Flagyl and Cipro.   Possible d/c home today.

## 2017-01-15 NOTE — DISCHARGE SUMMARY
PRIMARY CARE PHYSICIAN:  Dr. Albert Chapman.      DATE OF ADMISSION:  01/11/2017.       DATE OF DISCHARGE:  01/14/2017.       DISCHARGE DISPOSITION:  Home.      DISCHARGE CONDITION:  Improving.      PHYSICAL EXAMINATION:    IN GENERAL:  He is awake, alert, comfortable without any signs of distress.   ABDOMEN:  Soft, really nontender at this time.      DISCHARGE DIAGNOSES:   1.  Recurrence of acute diverticulitis with possible colo-enteric fistula.   2.  Mild intermittent asthma and rhinitis.  Stable.   3.  Family history of colon cancer.      CONSULTATIONS:  Colorectal Surgery.      IMAGING:  CT abdomen and pelvis.      PENDING LAB TESTS:  None.      HISTORY OF PRESENT ILLNESS:  Please refer the H&P for full details.  In brief, Phillip Nassar is a 51-year-old gentleman with a previous history of diverticulitis, who came to the hospital with concerns for abdominal pain.      HOSPITAL COURSE:  The patient was started on p.o. Augmentin prior to admission; however, he had worsening symptoms and came to the hospital and had failed outpatient antibiotics.  He was treated with IV Cipro and Flagyl with great improvement in his symptoms.  He was seen by Colorectal Surgery in consultation.  He had improved with conservative management and at this time, his pain is much improved and he is tolerating a regular diet without any difficulties.  He is doing well and stable for discharge home today.      Of note, on the patient's CT scan, there was some concern for possible colo-enteric fistula.  Colorectal Surgery has advised conservative care at this time.  They will see him in followup in about 6 weeks with repeat colonoscopy and evaluate him at that time.  The patient is doing well and stable for discharge.      DISCHARGE DIET:  Low-residue diet for now.      DISCHARGE FOLLOWUP:    1.  Follow up with the primary care provider within 1 week for hospital followup.   2.  Follow with Colorectal Surgery in about 6 weeks to discuss  plan for the future and repeat colonoscopy.      Total time spent in face-to-face contact with the patient and coordinating discharge was more than 30 minutes, including extensive discussion about the plans for followup, diet modifications, and the physiology and natural course of diverticulitis.      ALLERGIES:  No known drug allergies.        Discussed with the patient's side effects of the new medications including risk of tendon rupture with fluoroquinolone and abstinence from alcohol due to the Flagyl.  Also he was given a small supply of Norco for pain relief for breakthrough pain.  He understands the risk of this including respiratory depression and death if not taken as prescribed.  Also discussed that he should not drive or operate machinery while taking narcotic pain medications.  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     Discharge Medication List as of 1/14/2017  1:32 PM      START taking these medications    Details   ciprofloxacin (CIPRO) 500 MG tablet Take 1 tablet (500 mg) by mouth 2 times daily for 10 days, Disp-20 tablet, R-0, E-Prescribe      metroNIDAZOLE (FLAGYL) 500 MG tablet Take 1 tablet (500 mg) by mouth 3 times daily for 10 days, Disp-30 tablet, R-0, E-Prescribe      Lactobacillus-Inulin (DesktimeE DIGESTIVE HEALTH) CAPS Take 1 capsule by mouth 2 times daily for 10 days, Disp-20 capsule, R-0, E-Prescribe      HYDROcodone-acetaminophen (NORCO) 5-325 MG per tablet Take 1 tablet by mouth every 6 hours as needed for moderate to severe pain, Disp-15 tablet, R-0, Local Print         CONTINUE these medications which have NOT CHANGED    Details   vitamin C-electrolytes (EMERGEN-C) 1000mg vitamin C super orange drink mix Take 1 packet by mouth daily Mix 1 packet in 4-6oz water and take once or twice daily or as directed., Historical      cetirizine HCl (ZYRTEC ALLERGY) 10 MG CAPS Take by mouth daily , Historical       montelukast (SINGULAIR) 10 MG tablet Take 1 tablet (10 mg) by mouth At Bedtime, Disp-90 tablet, R-3, E-Prescribe      albuterol (PROAIR HFA, PROVENTIL HFA, VENTOLIN HFA) 108 (90 BASE) MCG/ACT inhaler Inhale 2 puffs into the lungs every 6 hours as needed for shortness of breath / dyspnea, Disp-3 Inhaler, R-1, E-Prescribe      fluticasone (FLONASE) 50 MCG/ACT nasal spray Spray 1-2 sprays into both nostrils daily, Disp-3 Package, R-3, E-Prescribe         STOP taking these medications       amoxicillin-clavulanate (AUGMENTIN) 500-125 MG per tablet Comments:   Reason for Stopping:               Results for orders placed or performed during the hospital encounter of 01/11/17   CT Abdomen Pelvis w Contrast     Value    Radiologist flags (Urgent)     Sigmoid diverticulitis, inflammatory change extending    Narrative    CT ABDOMEN PELVIS W CONTRAST 1/11/2017 2:16 PM    History: diverticulitis and urinary frequency, Diverticulitis of large  intestine without perforation or abscess without bleeding, Frequency  of micturition    Comparison: 9/4/2015    Technique: Helical CT images were obtained from the dome of the liver  to the symphysis pubis with IV contrast.  Images were reconstructed in  multiple planes using multiple reconstruction algorithms.    Contrast: iopamidol (ISOVUE-370) solution 135 mL    Findings:  Lung bases: Small subpleural nodular opacity may represent a focus of  atelectasis (series 2 image 11), in the posterior lateral left lower  lobe. This appears excluded from the field-of-view on 9/24/2015.    Abdomen and pelvis: In the pelvis, there is inflammatory change and  wall thickening in the sigmoid colon, with extensive diverticulosis.  The inflammatory change extends to involve the distal ileum (best  visualized on series 2 image 141). No drainable fluid collection is  identified. The inflammatory change extends to the superior margin of  the urinary bladder. No adenopathy in the abdomen or pelvis.  No  intra-abdominal free fluid or air. The remainder of the visualized  abdominal and pelvic viscera are unremarkable.    Subcutaneous soft tissues: Subcutaneous fat and musculature is within  normal limits. No inguinal lymphadenopathy.    Bones: Mild multilevel degenerative change of the visualized spine  without aggressive appearing bony lesion or acute fracture.      Impression    Impression:   1. Sigmoid diverticulitis, with inflammatory changes extending to  involve the distal ileum. Early coloenteric fistulization cannot be  excluded. No drainable fluid collection is identified.   2. Inflammatory change lies immediately superior to the urinary  bladder, which could explain urinary frequency as questioned.    [Urgent result: Sigmoid diverticulitis, inflammatory change extending  to the ileum, questionable early fistulization]    Findings were relayed to Dr. Toney by Dr. Vallejo by telephone at  2:54 PM, 2017.           I have personally reviewed the examination and initial interpretation  and I agree with the findings.    JUSTIN SCHAFER MD          Allergies   Allergen Reactions     No Known Drug Allergies              KARINA LOMBARDO MD             D: 01/15/2017 10:33   T: 01/15/2017 11:04   MT: [id number]      Name:     EARLINE WOODS   MRN:      [id number]        Account:        [id number]   :      1965           Admit Date:     [id number]                                  Discharge Date: 2017      Document: [id number]       cc: Albert Chapman MD

## 2017-01-23 ENCOUNTER — OFFICE VISIT (OUTPATIENT)
Dept: FAMILY MEDICINE | Facility: CLINIC | Age: 52
End: 2017-01-23
Payer: COMMERCIAL

## 2017-01-23 VITALS
SYSTOLIC BLOOD PRESSURE: 108 MMHG | WEIGHT: 218.2 LBS | HEIGHT: 68 IN | BODY MASS INDEX: 33.07 KG/M2 | DIASTOLIC BLOOD PRESSURE: 74 MMHG | HEART RATE: 72 BPM | RESPIRATION RATE: 20 BRPM | TEMPERATURE: 98 F

## 2017-01-23 DIAGNOSIS — K57.32 DIVERTICULITIS OF COLON: Primary | ICD-10-CM

## 2017-01-23 DIAGNOSIS — K63.2 COLO-ENTERIC FISTULA: ICD-10-CM

## 2017-01-23 PROCEDURE — 99495 TRANSJ CARE MGMT MOD F2F 14D: CPT | Performed by: INTERNAL MEDICINE

## 2017-01-23 NOTE — PROGRESS NOTES
SUBJECTIVE:                                                    Phillip Nassar is a 51 year old male who presents to clinic today for the following health issues:    ED/UC Followup:    Facility:  [facility]  Date of visit: 01/11/2017  Reason for visit: Diverticulitis  Current Status: Pt states that he feels much better and colonoscopy scheduled for 02/28/2017   patient here for hospital follow-up, doing well. taking the antibiotics and tolerating well, no side effects noted. no fevers. appeteti is good, doing low risidual diet for now, told to change ot high fiber once antibiotics are done. does eat a lot of red meat. no new symptoms. no pain.     Problem list and histories reviewed & adjusted, as indicated.  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 Provider to review       Social History   Substance Use Topics     Smoking status: Never Smoker      Smokeless tobacco: Never Used     Alcohol Use: No     Family History   Problem Relation Age of Onset     Cancer - colorectal Paternal Aunt      Cancer - colorectal Paternal Uncle      DIABETES Father        ROS:  A complete 10 point review of systems was taken and negative except for those noted in the subjective/HPI section(s) above     Problem list, Medication list, Allergies, and Medical/Social/Surgical histories reviewed [facility] and updated as appropriate.    OBJECTIVE:                                                    /74 mmHg  Pulse 72  Temp(Src)  "98  F (36.7  C) (Oral)  Resp 20  Ht 5' 8.25\" (1.734 m)  Wt 218 lb 3.2 oz (98.975 kg)  BMI 32.92 kg/m2   Constitutional: healthy, alert and no distress  HEENT: normocephalic and atrumatic, mucous membranes moist, op clear, mucous membranes moist, neck supple   Cardiovascular: regular rate and rhythm no rubs, gallops or murmurs normal S1/S2; no S3 or S4  Respiratory: clear to auscultation bilaterally in all lung fields, normal insp/exp effort. Good air movement  Gastrointestinal: Abdomen soft, non-tender. BS normal. No masses, organomegaly  Musculoskeletal: extremities normal- no gross deformities noted, gait normal and normal muscle tone  Skin: no suspicious lesions or rashes  Neurologic: Gait normal. Reflexes normal and symmetric. Sensation grossly WNL.  Psychiatric: mentation appears normal and affect normal/bright         ASSESSMENT/PLAN:                                                        ICD-10-CM    1. Diverticulitis of colon K57.32    2. 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  [facility] course, imaging, lab, etc in detail as well as colorectal's reccs, etc. discussed with patient (or patient's parents/caregiver) pathophysiology of condition and treatment options.  reviewed labs, medications, diet ,etc.  plan to contie diet and plan, get colonoscopy in a few weeks. New medication(s) indication(s), adverse effects, risks and benefits discussed. Also d/w pt signs/symptoms of worsening of condition and need for urgent ED evaluation. Patient verbalized understanding and is agreeable to this plan.         Estimated body mass index is 32.92 kg/(m^2) as calculated from the following:    Height as of this encounter: 5' 8.25\" (1.734 m).    Weight as of this encounter: 218 lb 3.2 oz (98.975 kg).      Return to clinic as needed or if symptoms persist, change, worsen or if any new symptoms develop.    Albert" JAUN Chapman.  Internal Medicine-Pediatrics

## 2017-01-23 NOTE — MR AVS SNAPSHOT
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   Diverticulitis is a problem that can happen if you have diverticula in your intestine. Diverticula are tiny pouches or weak areas that bulge out from the lining of the wall of the intestine. They look like small thumbs poking out of the side of the bowel. When you have diverticula in your intestines, it is called diverticulosis. When these pouches become inflamed, it is called diverticulitis.  You are more likely to have these pouches as you get older. About one-third of people over 50 and two-thirds of people over 80 have diverticulosis.  How does it occur?   It appears that the main cause of diverticular disease is too little fiber in the diet. Fiber is the part of fruits, vegetables, and grains that the body cannot digest. Fiber helps make stools soft and easy to pass. It helps prevent constipation. When you have constipation, your muscles strain to move stool that is too hard. The high pressure causes the weak spots in the colon to bulge out and become diverticula.  Diverticulitis occurs when diverticula become infected or inflamed. Doctors are not certain what causes the illness. It may begin when stool or bacteria are caught in the diverticula.  What are the symptoms?   Symptoms of diverticulitis may include:  alternating diarrhea and constipation   severe cramps in your lower left side that come and go   pain on the lower left side of the abdomen   chills or fever   nausea and vomiting   rectal bleeding.  How is it diagnosed?   Your healthcare provider will review  your symptoms and examine you. You may have the following tests:  sigmoidoscopy (exam of the rectum and lower end of the large intestine with a thin, flexible, lighted tube)   colonoscopy (exam of most of the intestine with a thin, flexible, lighted tube)   barium enema or lower GI X-ray   blood tests.  How is it treated?   For an attack of acute diverticulitis, you may need to be hospitalized. Depending on how bad the attack is, your treatment may include antibiotics, intravenous (IV) fluids, and nasogastric suction (a procedure that relieves pressure in the intestine).  If attacks are severe or frequent, you may need surgery. There are 2 types of surgery to correct the problem.   Colon resection. The area of the colon with the infected diverticula is removed and the remaining ends of the colon are sewn back together.   Colostomy. The colostomy is done to bypass the inflamed colon to help it heal. A colostomy attaches part of the healthy colon to an opening in the wall of the abdomen. Bowel movements then pass through this opening instead of the rectum. They are collected in a bag outside the body. After the colon has healed, the colostomy can be reversed. This means that you will have a second surgery to rejoin the ends of the colon to each other and will no longer have a colostomy.  How long will the effects last?   Diverticulitis is usually mild and should respond well to antibiotics and changes in diet.  How can I take care of myself?   Use a source of heat such as a hot water bottle for cramps.   If you have diarrhea, drink liquids and avoid solid foods. Try to rest until the diarrhea stops. When your symptoms are gone, eat soft, bland, low-fiber foods at first. Your healthcare provider will let you know when you should gradually begin eating a high-fiber diet.   Take all the medicine prescribed by your healthcare provider. If you stop taking antibiotics when your symptoms are gone but before the scheduled end  of treatment, the symptoms may return.   If your symptoms worsen, contact your provider.  How can I help prevent recurrence of diverticulitis?   Follow your healthcare provider's prescribed treatment, including diet recommendations.   Once you are well, eat regular, nutritious meals containing high-fiber foods, such as fruits, vegetables, and whole-grain foods. Many people find fiber supplements, such as Metamucil, Citrucel, or other psyllium products, to be helpful, but in a few cases they make constipation worse.   Drink plenty of water.   Watch for changes in your normal bowel pattern. If you are having problems with constipation or diarrhea, make an appointment to see your healthcare provider.   Get enough rest and sleep.   Exercise as recommended by your provider.   Watch to see if some foods seem to cause abdominal pain. Foods that are more likely to cause pain are popcorn kernels and other foods that may get stuck in diverticula, such as sunflower seeds, sesame seeds, and nuts. The seeds in tomatoes, zucchini, cucumbers, strawberries, and raspberries, as well as poppy seeds, are usually harmless. Keeping a food diary may help you remember what you ate a few hours before getting abdominal pain.   Contact your healthcare provider if your symptoms come back.     Published by [employer].  This content is reviewed periodically and is subject to change as new health information becomes available. The information is intended to inform and educate and is not a replacement for medical evaluation, advice, diagnosis or treatment by a healthcare professional.                      Follow-ups after your visit        Your next 10 appointments already scheduled     Feb 28, 2017   Procedure with Destiny Burks MD   [facility] ([facility])    [address] [place] [address]   [phone]           [facility] is located at [address] [place]          "     Who to contact     If you have questions or need follow up information about today's clinic visit or your schedule please contact [facility] directly at [phone].  Normal or non-critical lab and imaging results will be communicated to you by MyChart, letter or phone within 4 business days after the clinic has received the results. If you do not hear from us within 7 days, please contact the clinic through [employer] or phone. If you have a critical or abnormal lab result, we will notify you by phone as soon as possible.  Submit refill requests through [employer] or call your pharmacy and they will forward the refill request to us. Please allow 3 business days for your refill to be completed.          Additional Information About Your Visit        [employer] Information     [employer] gives you secure access to your electronic health record. If you see a primary care provider, you can also send messages to your care team and make appointments. If you have questions, please call your primary care clinic.  If you do not have a primary care provider, please call [phone] and they will assist you.        Care EveryWhere ID     This is your Care EveryWhere ID. This could be used by other organizations to access your [facility] medical records  [id number]        Your Vitals Were     Pulse Temperature Respirations Height BMI (Body Mass Index)       72 98  F (36.7  C) (Oral) 20 5' 8.25\" (1.734 m) 32.92 kg/m2        Blood Pressure from Last 3 Encounters:   01/23/17 108/74   01/14/17 111/69   01/11/17 128/60    Weight from Last 3 Encounters:   01/23/17 218 lb 3.2 oz (98.975 kg)   01/12/17 221 lb 9 oz (100.5 kg)   01/11/17 224 lb (101.606 kg)              Today, you had the following     No orders found for display       Primary Care Provider Office Phone # Fax #    Albert Chapman [phone]       [facility] [address] [place] [address]        Thank you!     Thank you for " choosing [facility]  for your care. Our goal is always to provide you with excellent care. Hearing back from our patients is one way we can continue to improve our services. Please take a few minutes to complete the written survey that you may receive in the mail after your visit with us. Thank you!             Your Updated Medication List - Protect others around you: Learn how to safely use, store and throw away your medicines at www.disposemymeds.org.          This list is accurate as of: 1/23/17 10:19 AM.  Always use your most recent med list.                   Brand Name Dispense Instructions for use    albuterol 108 (90 BASE) MCG/ACT Inhaler    PROAIR HFA/PROVENTIL HFA/VENTOLIN HFA    3 Inhaler    Inhale 2 puffs into the lungs every 6 hours as needed for shortness of breath / dyspnea       ciprofloxacin 500 MG tablet    CIPRO    20 tablet    Take 1 tablet (500 mg) by mouth 2 times daily for 10 days       [facility] DIGESTIVE HEALTH Caps     20 capsule    Take 1 capsule by mouth 2 times daily for 10 days       fluticasone 50 MCG/ACT spray    FLONASE    3 Package    Spray 1-2 sprays into both nostrils daily       HYDROcodone-acetaminophen 5-325 MG per tablet    NORCO    15 tablet    Take 1 tablet by mouth every 6 hours as needed for moderate to severe pain       metroNIDAZOLE 500 MG tablet    FLAGYL    30 tablet    Take 1 tablet (500 mg) by mouth 3 times daily for 10 days       montelukast 10 MG tablet    SINGULAIR    90 tablet    Take 1 tablet (10 mg) by mouth At Bedtime       vitamin C-electrolytes 1000mg vitamin C super orange drink mix    EMERGEN-C     Take 1 packet by mouth daily Mix 1 packet in 4-6oz water and take once or twice daily or as directed.       ZYRTEC ALLERGY 10 MG Caps   Generic drug:  cetirizine HCl      Take by mouth daily

## 2017-01-23 NOTE — PATIENT INSTRUCTIONS
Diverticulitis  What is diverticulitis?   Diverticulitis is a problem that can happen if you have diverticula in your intestine. Diverticula are tiny pouches or weak areas that bulge out from the lining of the wall of the intestine. They look like small thumbs poking out of the side of the bowel. When you have diverticula in your intestines, it is called diverticulosis. When these pouches become inflamed, it is called diverticulitis.  You are more likely to have these pouches as you get older. About one-third of people over 50 and two-thirds of people over 80 have diverticulosis.  How does it occur?   It appears that the main cause of diverticular disease is too little fiber in the diet. Fiber is the part of fruits, vegetables, and grains that the body cannot digest. Fiber helps make stools soft and easy to pass. It helps prevent constipation. When you have constipation, your muscles strain to move stool that is too hard. The high pressure causes the weak spots in the colon to bulge out and become diverticula.  Diverticulitis occurs when diverticula become infected or inflamed. Doctors are not certain what causes the illness. It may begin when stool or bacteria are caught in the diverticula.  What are the symptoms?   Symptoms of diverticulitis may include:  alternating diarrhea and constipation   severe cramps in your lower left side that come and go   pain on the lower left side of the abdomen   chills or fever   nausea and vomiting   rectal bleeding.  How is it diagnosed?   Your healthcare provider will review your symptoms and examine you. You may have the following tests:  sigmoidoscopy (exam of the rectum and lower end of the large intestine with a thin, flexible, lighted tube)   colonoscopy (exam of most of the intestine with a thin, flexible, lighted tube)   barium enema or lower GI X-ray   blood tests.  How is it treated?   For an attack of acute diverticulitis, you may need to be  hospitalized. Depending on how bad the attack is, your treatment may include antibiotics, intravenous (IV) fluids, and nasogastric suction (a procedure that relieves pressure in the intestine).  If attacks are severe or frequent, you may need surgery. There are 2 types of surgery to correct the problem.   Colon resection. The area of the colon with the infected diverticula is removed and the remaining ends of the colon are sewn back together.   Colostomy. The colostomy is done to bypass the inflamed colon to help it heal. A colostomy attaches part of the healthy colon to an opening in the wall of the abdomen. Bowel movements then pass through this opening instead of the rectum. They are collected in a bag outside the body. After the colon has healed, the colostomy can be reversed. This means that you will have a second surgery to rejoin the ends of the colon to each other and will no longer have a colostomy.  How long will the effects last?   Diverticulitis is usually mild and should respond well to antibiotics and changes in diet.  How can I take care of myself?   Use a source of heat such as a hot water bottle for cramps.   If you have diarrhea, drink liquids and avoid solid foods. Try to rest until the diarrhea stops. When your symptoms are gone, eat soft, bland, low-fiber foods at first. Your healthcare provider will let you know when you should gradually begin eating a high-fiber diet.   Take all the medicine prescribed by your healthcare provider. If you stop taking antibiotics when your symptoms are gone but before the scheduled end of treatment, the symptoms may return.   If your symptoms worsen, contact your provider.  How can I help prevent recurrence of diverticulitis?   Follow your healthcare provider's prescribed treatment, including diet recommendations.   Once you are well, eat regular, nutritious meals containing high-fiber foods, such as fruits, vegetables, and whole-grain foods. Many people find  fiber supplements, such as Metamucil, Citrucel, or other psyllium products, to be helpful, but in a few cases they make constipation worse.   Drink plenty of water.   Watch for changes in your normal bowel pattern. If you are having problems with constipation or diarrhea, make an appointment to see your healthcare provider.   Get enough rest and sleep.   Exercise as recommended by your provider.   Watch to see if some foods seem to cause abdominal pain. Foods that are more likely to cause pain are popcorn kernels and other foods that may get stuck in diverticula, such as sunflower seeds, sesame seeds, and nuts. The seeds in tomatoes, zucchini, cucumbers, strawberries, and raspberries, as well as poppy seeds, are usually harmless. Keeping a food diary may help you remember what you ate a few hours before getting abdominal pain.   Contact your healthcare provider if your symptoms come back.     Published by [employer]  This content is reviewed periodically and is subject to change as new health information becomes available. The information is intended to inform and educate and is not a replacement for medical evaluation, advice, diagnosis or treatment by a healthcare professional.

## 2017-01-23 NOTE — NURSING NOTE
"Chief Complaint   Patient presents with     ER F/U       Initial /74 mmHg  Pulse 72  Temp(Src) 98  F (36.7  C) (Oral)  Resp 20  Ht 5' 8.25\" (1.734 m)  Wt 218 lb 3.2 oz (98.975 kg)  BMI 32.92 kg/m2 Estimated body mass index is 32.92 kg/(m^2) as calculated from the following:    Height as of this encounter: 5' 8.25\" (1.734 m).    Weight as of this encounter: 218 lb 3.2 oz (98.975 kg).  BP completed using cuff size: large Rt arm  Anabel AFY, EMPERATRIZ,AAMA      "

## 2017-01-24 ASSESSMENT — ASTHMA QUESTIONNAIRES: ACT_TOTALSCORE: 25

## 2017-02-28 ENCOUNTER — HOSPITAL ENCOUNTER (OUTPATIENT)
Facility: CLINIC | Age: 52
Discharge: HOME | End: 2017-02-28
Attending: COLON & RECTAL SURGERY | Admitting: COLON & RECTAL SURGERY
Payer: COMMERCIAL

## 2017-02-28 VITALS
DIASTOLIC BLOOD PRESSURE: 92 MMHG | SYSTOLIC BLOOD PRESSURE: 127 MMHG | RESPIRATION RATE: 16 BRPM | OXYGEN SATURATION: 96 %

## 2017-02-28 LAB — COLONOSCOPY: NORMAL

## 2017-02-28 PROCEDURE — 45378 DIAGNOSTIC COLONOSCOPY: CPT | Performed by: COLON & RECTAL SURGERY

## 2017-02-28 PROCEDURE — 25000128 H RX IP 250 OP 636: Performed by: COLON & RECTAL SURGERY

## 2017-02-28 PROCEDURE — G0105 COLORECTAL SCRN; HI RISK IND: HCPCS | Performed by: COLON & RECTAL SURGERY

## 2017-02-28 PROCEDURE — G0500 MOD SEDAT ENDO SERVICE >5YRS: HCPCS | Performed by: COLON & RECTAL SURGERY

## 2017-02-28 PROCEDURE — 25000125 ZZHC RX 250: Performed by: COLON & RECTAL SURGERY

## 2017-02-28 RX ORDER — ONDANSETRON 4 MG/1
4 TABLET, ORALLY DISINTEGRATING ORAL EVERY 6 HOURS PRN
Status: DISCONTINUED | OUTPATIENT
Start: 2017-02-28 | End: 2017-02-28 | Stop reason: HOSPADM

## 2017-02-28 RX ORDER — ONDANSETRON 2 MG/ML
4 INJECTION INTRAMUSCULAR; INTRAVENOUS EVERY 6 HOURS PRN
Status: DISCONTINUED | OUTPATIENT
Start: 2017-02-28 | End: 2017-02-28 | Stop reason: HOSPADM

## 2017-02-28 RX ORDER — ONDANSETRON 2 MG/ML
4 INJECTION INTRAMUSCULAR; INTRAVENOUS
Status: DISCONTINUED | OUTPATIENT
Start: 2017-02-28 | End: 2017-02-28 | Stop reason: HOSPADM

## 2017-02-28 RX ORDER — FLUMAZENIL 0.1 MG/ML
0.2 INJECTION, SOLUTION INTRAVENOUS
Status: DISCONTINUED | OUTPATIENT
Start: 2017-02-28 | End: 2017-02-28 | Stop reason: HOSPADM

## 2017-02-28 RX ORDER — NALOXONE HYDROCHLORIDE 0.4 MG/ML
.1-.4 INJECTION, SOLUTION INTRAMUSCULAR; INTRAVENOUS; SUBCUTANEOUS
Status: DISCONTINUED | OUTPATIENT
Start: 2017-02-28 | End: 2017-02-28 | Stop reason: HOSPADM

## 2017-02-28 RX ORDER — FENTANYL CITRATE 50 UG/ML
INJECTION, SOLUTION INTRAMUSCULAR; INTRAVENOUS PRN
Status: DISCONTINUED | OUTPATIENT
Start: 2017-02-28 | End: 2017-02-28 | Stop reason: HOSPADM

## 2017-02-28 RX ORDER — LIDOCAINE 40 MG/G
CREAM TOPICAL
Status: DISCONTINUED | OUTPATIENT
Start: 2017-02-28 | End: 2017-02-28 | Stop reason: HOSPADM

## 2017-02-28 NOTE — H&P
Pre-Endoscopy History and Physical     Phillip Nassar MRN# [id number]   YOB: 1965 Age: 51 year old     Date of Procedure: 2/28/2017  Primary care provider: Albert Chapman  Type of Endoscopy: colonoscopy  Reason for Procedure: abnormal CT    Type of Anesthesia Anticipated: Moderate Sedation    HPI:    Phillip is a 51 year old male who will be undergoing the above procedure.      A history and physical has been performed. The patient's medications and allergies have been reviewed. The risks and benefits of the procedure and the sedation options and risks were discussed with the patient.  All questions were answered and informed consent was obtained.      He denies a personal or family history of anesthesia complications or bleeding disorders.     Allergies   Allergen Reactions     No Known Drug Allergies         Prior to Admission Medications   Prescriptions Last Dose Informant Patient Reported? Taking?   HYDROcodone-acetaminophen (NORCO) 5-325 MG per tablet Unknown at Unknown time  No No   Sig: Take 1 tablet by mouth every 6 hours as needed for moderate to severe pain   albuterol (PROAIR HFA, PROVENTIL HFA, VENTOLIN HFA) 108 (90 BASE) MCG/ACT inhaler More than a month at Unknown time  No No   Sig: Inhale 2 puffs into the lungs every 6 hours as needed for shortness of breath / dyspnea   cetirizine HCl (ZYRTEC ALLERGY) 10 MG CAPS Past Week Self Yes Yes   Sig: Take by mouth daily    fluticasone (FLONASE) 50 MCG/ACT nasal spray Past Week at Unknown time  No Yes   Sig: Spray 1-2 sprays into both nostrils daily   montelukast (SINGULAIR) 10 MG tablet Unknown at Unknown time  No No   Sig: Take 1 tablet (10 mg) by mouth At Bedtime   vitamin C-electrolytes (EMERGEN-C) 1000mg vitamin C super orange drink mix Past Week Self Yes Yes   Sig: Take 1 packet by mouth daily Mix 1 packet in 4-6oz water and take once or twice daily or as directed.      Facility-Administered Medications: None       Patient Active Problem  "List   Diagnosis     Allergic rhinitis     Diverticulitis of colon     CARDIOVASCULAR SCREENING; LDL GOAL LESS THAN 160     Intermittent asthma     SBO (small bowel obstruction) (H)     Shoulder joint pain     DJD of shoulder     Shoulder impingement syndrome     Acute diverticulitis        Past Medical History   Diagnosis Date     Allergic rhinitis, cause unspecified      Diverticulitis      Mild intermittent asthma         Past Surgical History   Procedure Laterality Date     Colonoscopy  3/7/2014     Procedure: COLONOSCOPY;  Colonoscopy  ;  Surgeon: Gucci Martin MD;  Location:  GI     Orthopedic surgery Left      knee arthroscopic     Ent surgery       Sinus surgery       Social History   Substance Use Topics     Smoking status: Never Smoker     Smokeless tobacco: Never Used     Alcohol use No       Family History   Problem Relation Age of Onset     DIABETES Father      Cancer - colorectal Paternal Aunt      Cancer - colorectal Paternal Uncle      Colon Cancer No family hx of        REVIEW OF SYSTEMS:     5 point ROS negative except as noted above in HPI, including Gen., Resp., CV, GI &  system review.      PHYSICAL EXAM:   There were no vitals taken for this visit. Estimated body mass index is 32.93 kg/(m^2) as calculated from the following:    Height as of 1/23/17: 1.734 m (5' 8.25\").    Weight as of 1/23/17: 99 kg (218 lb 3.2 oz).   GENERAL APPEARANCE: healthy and alert  MENTAL STATUS: alert  AIRWAY EXAM: Mallampatti Class II (visualization of the soft palate, fauces, and uvula)  RESP: lungs clear to auscultation - no rales, rhonchi or wheezes  CV: regular rates and rhythm      DIAGNOSTICS:    Not indicated      IMPRESSION   ASA Class 2 - Mild systemic disease        PLAN:       Plan for colonoscopy. We discussed the risks, benefits and alternatives and the patient wished to proceed.    The above has been forwarded to the consulting provider.      Signed Electronically by: Destiny Burks, " MD  February 28, 2017

## 2017-02-28 NOTE — OP NOTE
See Provation Note In Chart    Destiny Burks MD  [facility]  Office Phone # [phone]

## 2017-02-28 NOTE — IP AVS SNAPSHOT
MRN:[id number]                      After Visit Summary   2/28/2017    Phillip Nassar    MRN: [id number]           Thank you!     Thank you for choosing [facility] for your care. Our goal is always to provide you with excellent care. Hearing back from our patients is one way we can continue to improve our services. Please take a few minutes to complete the written survey that you may receive in the mail after you visit. If you would like to speak to someone directly about your visit please contact Patient Relations at [phone]. Thank you!          Patient Information     Date Of Birth          1965        About your hospital stay     You were admitted on:  February 28, 2017 You last received care in the:  [facility]    You were discharged on:  February 28, 2017       Who to Call     For medical emergencies, please call 911.  For non-urgent questions about your medical care, please call your primary care provider or clinic, [phone]  For questions related to your surgery, please call your surgery clinic        Attending Provider     Provider Specialty    Destiny Burks MD Colon and Rectal Surgery       Primary Care Provider Office Phone # Fax #    Albert Chapman [phone]       [facility] [address] [place] [address]        Pending Results     No orders found from 2/26/2017 to 3/1/2017.            Admission Information     Date & Time Provider Department Dept. Phone    2/28/2017 Destiny Burks MD [facility] [phone]      Your Vitals Were     Blood Pressure Respirations Pulse Oximetry             121/90 16 96%         MyChart Information     [employer] gives you secure access to your electronic health record. If you see a primary care provider, you can also send messages to your care team and make appointments. If you have questions, please call your primary care clinic.  If you do not have a  primary care provider, please call [phone] and they will assist you.        Care EveryWhere ID     This is your Care EveryWhere ID. This could be used by other organizations to access your [facility] medical records  [id number]           Review of your medicines      CONTINUE these medicines which have NOT CHANGED        Dose / Directions    albuterol 108 (90 BASE) MCG/ACT Inhaler   Commonly known as:  PROAIR HFA/PROVENTIL HFA/VENTOLIN HFA   Used for:  Intermittent asthma        Dose:  2 puff   Inhale 2 puffs into the lungs every 6 hours as needed for shortness of breath / dyspnea   Quantity:  3 Inhaler   Refills:  1       fluticasone 50 MCG/ACT spray   Commonly known as:  FLONASE   Used for:  Allergic rhinitis, cause unspecified        Dose:  1-2 spray   Spray 1-2 sprays into both nostrils daily   Quantity:  3 Package   Refills:  3       HYDROcodone-acetaminophen 5-325 MG per tablet   Commonly known as:  NORCO   Used for:  Acute diverticulitis        Dose:  1 tablet   Take 1 tablet by mouth every 6 hours as needed for moderate to severe pain   Quantity:  15 tablet   Refills:  0       montelukast 10 MG tablet   Commonly known as:  SINGULAIR   Used for:  Intermittent asthma, uncomplicated        Dose:  10 mg   Take 1 tablet (10 mg) by mouth At Bedtime   Quantity:  90 tablet   Refills:  3       vitamin C-electrolytes 1000mg vitamin C super orange drink mix   Commonly known as:  EMERGEN-C        Dose:  1 packet   Take 1 packet by mouth daily Mix 1 packet in 4-6oz water and take once or twice daily or as directed.   Refills:  0       ZYRTEC ALLERGY 10 MG Caps   Generic drug:  cetirizine HCl        Take by mouth daily   Refills:  0                Protect others around you: Learn how to safely use, store and throw away your medicines at www.disposemymeds.org.             Medication List: This is a list of all your medications and when to take them. Check marks below indicate your daily home schedule. Keep this list  as a reference.      Medications           Morning Afternoon Evening Bedtime As Needed    albuterol 108 (90 BASE) MCG/ACT Inhaler   Commonly known as:  PROAIR HFA/PROVENTIL HFA/VENTOLIN HFA   Inhale 2 puffs into the lungs every 6 hours as needed for shortness of breath / dyspnea                                fluticasone 50 MCG/ACT spray   Commonly known as:  FLONASE   Spray 1-2 sprays into both nostrils daily                                HYDROcodone-acetaminophen 5-325 MG per tablet   Commonly known as:  NORCO   Take 1 tablet by mouth every 6 hours as needed for moderate to severe pain                                montelukast 10 MG tablet   Commonly known as:  SINGULAIR   Take 1 tablet (10 mg) by mouth At Bedtime                                vitamin C-electrolytes 1000mg vitamin C super orange drink mix   Commonly known as:  EMERGEN-C   Take 1 packet by mouth daily Mix 1 packet in 4-6oz water and take once or twice daily or as directed.                                ZYRTEC ALLERGY 10 MG Caps   Take by mouth daily   Generic drug:  cetirizine HCl                                          More Information        Understanding Diverticulosis and Diverticulitis  The colon (large intestine) is the last part of the digestive tract. It absorbs water from stool and changes it from a liquid to a solid. In certain cases, small pouches called diverticula can form in the colon wall. This condition is called diverticulosis. The pouches can become infected. If this happens, it becomes a more serious problem called diverticulitis. These problems can be painful. But they can be managed.     Pouches or diverticula usually occur in the lower part of the colon called the sigmoid.   Managing your condition  Diet changes or medications may be prescribed.      Diverticulitis occurs when the pouches become infected or inflamed.   If you have diverticulosis    Diet changes are often enough to control symptoms. The main changes are  adding fiber (roughage) and drinking more water. Fiber absorbs water as it travels through your colon. This helps your stool stay soft and move smoothly. Water helps this process.    If needed, you may be told to take over-the-counter stool softeners.    To help relieve pain, antispasmodic medications may be prescribed.    Watch for changes in your bowel movements. Tell the doctor if you notice any changes.    Begin an exercise program. Ask your doctor how to get started.    Get plenty of rest and sleep.   If you have diverticulitis  Treatment depends on how bad your symptoms are.    For mild symptoms. You may be put on a liquid diet for a short time. Antibiotics are usually prescribed. If these 2 steps relieve your symptoms, you may then be prescribed a high-fiber diet. If you still have symptoms, your doctor will discuss further treatment options with you.    For severe symptoms. You may need to be admitted to the hospital. There, you can be given IV antibiotics and fluids. You will also be put on a low-fiber or liquid diet. Although not common, surgery is needed in some people with severe symptoms.  [place] to colon health  Help keep your colon healthy with a diet that includes plenty of high-fiber fruits, vegetables, and whole grains. Drink plenty of liquids like water and juice.     2204-3357 [employer]. [address] [place] [address], [place] [address]. All rights reserved. This information is not intended as a substitute for professional medical care. Always follow your healthcare professional's instructions.                Discharge Instructions: Eating a High-Fiber Diet  Your health care provider has prescribed a high-fiber diet for you. Fiber is what gives strength and structure to plants. Most grains, beans, vegetables, and fruits contain fiber. Foods rich in fiber are often low in calories and fat, but they fill you up more. These foods may also reduce the risk of certain health problems.  There are  two types of fiber:    Insoluble fiber. This is found in whole grains, cereals, and certain fruits and vegetables (such as apple skins, corn, and beans). Insoluble fiber is made up mainly of plant cell walls. It may prevent constipation and reduce the risk of certain types of cancer.    Soluble fiber. This type of fiber is found in oats, beans, nuts, and certain fruits and vegetables (such as strawberries and peas). Soluble fiber turns to gel in the digestive system, slowing the movement of the digestive tract. It helps control blood sugar levels and can reduce cholesterol, which may help lower the risk of heart disease. Soluble fiber can also help control appetite.     Home care    Know how much fiber you need a day. The recommended daily amount of fiber is 25 grams for women and 38 grams for men. After age 50, daily fiber needs drop to 21 grams for women and 30 grams for men.    Ask your doctor about a fiber supplement. (Always take fiber supplements with a large glass of water.)    Keep track of how much fiber you eat.    Eat a variety of foods high in fiber.    Learn to read and understand food labels.    Ask your healthcare provider how much water you should be drinking.    Look for these high-fiber foods:    Whole-grain breads and cereals    6 ounces a day give you about 18 grams of fiber (1 ounce is equal to 1 slice of bread, 1 cup of dry cereal, or 1/2 cup of cooked rice).    Include wheat and oat bran cereals, whole-wheat muffins or toast, and corn tortillas in your meals.    Fruits     2 cups a day give you about 8 grams of fiber.    Apples, oranges, strawberries, pears, and bananas are good sources.    Fruit juice does not contain as much fiber as the fruit it was made from.    Vegetables    2  cups a day give you about 11 grams of fiber. Add asparagus, carrots, broccoli, peas, and corn to your meals.    Legumes    1/4 cup a day (in place of meat) gives you about 4 grams of fiber. Try navy  beans, lentils, chickpeas, and soybeans.    Seeds     A small handful of seeds gives you about 3 grams of fiber. Try sunflower seeds.  Follow-up  Make a follow-up appointment with a nutritionist as directed by our staff.    0434-3093 [employer]. [address] [place] [address]. All rights reserved. This information is not intended as a substitute for professional medical care. Always follow your healthcare professional's instructions.                Eating a High-Fiber Diet  Fiber is what gives strength and structure to plants. Most grains, beans, vegetables, and fruits contain fiber. Foods rich in fiber are often low in calories and fat, and they fill you up more. They may also reduce your risks for certain health problems. To find out the amount of fiber in canned, packaged, or frozen foods, read the Nutrition Facts label. It tells you how much fiber is in a serving.    Types of fiber and their benefits  There are two types of fiber: insoluble and soluble. They both aid digestion and help you maintain a healthy weight.    Insoluble fiber. This is found in whole grains, cereals, certain fruits and vegetables such as apple skin, corn, and carrots. Insoluble fiber may prevent constipation and reduce the risk for certain types of cancer.    Soluble fiber. This type of fiber is in oats, beans, and certain fruits and vegetables such as strawberries and peas. Soluble fiber can reduce cholesterol, which may help lower the risk for heart disease. It also helps control blood sugar levels.  Look for high-fiber foods  Try these foods to add fiber to your diet:    Whole-grain breads and cereals. Try to eat 6 to 8 ounces a day. Include wheat and oat bran cereals, whole-wheat muffins or toast, and corn tortillas in your meals.    Fruits. Try to eat 2 cups a day. Apples, oranges, strawberries, pears, and bananas are good sources. (Note: Fruit juice is low in fiber.)    Vegetables. Try to eat at least 2.5  cups a day. Add asparagus, carrots, broccoli, peas, and corn to your meals.    Beans. One cup of cooked lentils gives you over 15 grams of fiber. Try navy beans, lentils, and chickpeas.    Seeds. A small handful of seeds gives you about 3 grams of fiber. Try sunflower seeds.  Keep track of your fiber  Keep track of how much fiber you eat. Start by reading food labels. Then eat a variety of foods high in fiber. As you begin to eat more fiber, ask your healthcare provider how much water you should be drinking to keep your digestive system working smoothly.  You should aim for a certain amount of fiber in your diet each day. If you are a woman, that amount is between 25 and 28 grams per day. Men should aim for 30 to 33 grams per day. After age 50, your daily fiber needs drop to 22 grams for women and 28 grams for men.  Before you reach for the fiber supplements, think about this. Fiber is found naturally in healthy whole foods. It gives you that feeling of fullness after you eat. Taking fiber supplements or eating fiber-enriched foods will not give you this full feeling.  Your fiber intake is a good measure for the quality of your overall diet. If you are missing out on your daily amount of fiber, you may be lacking other important nutrients as well.    6776-3989 [employer]. [address] [place] [address], [place] [address]. All rights reserved. This information is not intended as a substitute for professional medical care. Always follow your healthcare professional's instructions.

## 2017-04-18 ENCOUNTER — MYC MEDICAL ADVICE (OUTPATIENT)
Dept: FAMILY MEDICINE | Facility: CLINIC | Age: 52
End: 2017-04-18

## 2017-04-18 DIAGNOSIS — Z30.09 VASECTOMY EVALUATION: Primary | ICD-10-CM

## 2017-05-22 ENCOUNTER — OFFICE VISIT (OUTPATIENT)
Dept: UROLOGY | Facility: CLINIC | Age: 52
End: 2017-05-22
Payer: COMMERCIAL

## 2017-05-22 VITALS
SYSTOLIC BLOOD PRESSURE: 128 MMHG | DIASTOLIC BLOOD PRESSURE: 78 MMHG | HEIGHT: 69 IN | BODY MASS INDEX: 32.29 KG/M2 | HEART RATE: 78 BPM | WEIGHT: 218 LBS

## 2017-05-22 DIAGNOSIS — Z30.2 ENCOUNTER FOR STERILIZATION: Primary | ICD-10-CM

## 2017-05-22 PROCEDURE — 99203 OFFICE O/P NEW LOW 30 MIN: CPT | Performed by: UROLOGY

## 2017-05-22 ASSESSMENT — PAIN SCALES - GENERAL: PAINLEVEL: NO PAIN (0)

## 2017-05-22 NOTE — PROGRESS NOTES
VASECTOMY CONSULTATION NOTE  DATE OF VISIT: 5/22/2017    PATIENT NAME: Phillip Nassar    YOB: 1965      REASON FOR CONSULTATION: Mr. Phillip Nassar is a 51 year old year old gentleman who came to the urology clinic today requesting a vasectomy. He has 2 children and he wishes to have a vasectomy for birth control.     PAST MEDICAL HISTORY:   Past Medical History:   Diagnosis Date     Allergic rhinitis, cause unspecified      Diverticulitis      Mild intermittent asthma      Prostate infection        PAST SURGICAL HISTORY:   Past Surgical History:   Procedure Laterality Date     COLONOSCOPY  3/7/2014    Procedure: COLONOSCOPY;  Colonoscopy  ;  Surgeon: Gucci Martin MD;  Location:  GI     COLONOSCOPY  02/28/2017    Dr. Burks [facility]     COLONOSCOPY N/A 2/28/2017    Procedure: COLONOSCOPY;  Surgeon: Destiny Burks MD;  Location:  GI     ENT SURGERY      Sinus surgery     ORTHOPEDIC SURGERY Left     knee arthroscopic       MEDICATIONS:   Current Outpatient Prescriptions:      cetirizine HCl (ZYRTEC ALLERGY) 10 MG CAPS, Take by mouth daily , Disp: , Rfl:      montelukast (SINGULAIR) 10 MG tablet, Take 1 tablet (10 mg) by mouth At Bedtime, Disp: 90 tablet, Rfl: 3     albuterol (PROAIR HFA, PROVENTIL HFA, VENTOLIN HFA) 108 (90 BASE) MCG/ACT inhaler, Inhale 2 puffs into the lungs every 6 hours as needed for shortness of breath / dyspnea, Disp: 3 Inhaler, Rfl: 1     fluticasone (FLONASE) 50 MCG/ACT nasal spray, Spray 1-2 sprays into both nostrils daily, Disp: 3 Package, Rfl: 3    ALLERGIES:   Allergies   Allergen Reactions     No Known Drug Allergies        FAMILY HISTORY:   Family History   Problem Relation Age of Onset     DIABETES Father      Cancer - colorectal Paternal Aunt      Cancer - colorectal Paternal Uncle      Colon Cancer No family hx of        SOCIAL HISTORY:   Social History     Social History     Marital status:      Spouse name: N/A     Number of children: N/A     Years  "of education: N/A     Occupational History     Not on file.     Social History Main Topics     Smoking status: Never Smoker     Smokeless tobacco: Never Used     Alcohol use No     Drug use: No     Sexual activity: Yes     Partners: Female     Other Topics Concern     Parent/Sibling W/ Cabg, Mi Or Angioplasty Before 65f 55m? No     Social History Narrative       HEIGHT: 5' 9\"     WEIGHT: 218 lbs 0 oz   BP: 128/78    PULSE: 78    EXAM: He is alert and oriented and well-appearing.  Examination of the scrotum reveals normal scrotal skin.  The testicles are normal to palpation bilaterally with no intratesticular lesions.  He has normally palpable vasa bilaterally.    DIAGNOSIS: Request for sterilization    PLAN: The risks of the procedure as well as expectations for recovery and outcomes were splint in detail to him.  He was counseled on the risks for bleeding infection and pain after the procedure.  He was instructed to continue to use contraception until he had proven azoospermia on a semen specimen.  This would normally be collected at least 3 months after the procedure.  He was instructed to hold all anticoagulants medications for one week prior to the procedure.  He was also instructed to shave the scrotum prior to procedure.  It was recommended that he have someone else drive him home after his vasectomy.  In light of these risks and expectations he would like to proceed.  We are scheduling a vasectomy in the office in the near future.    Luis Prather M.D.    Answers for HPI/ROS submitted by the patient on 5/17/2017   General Symptoms: No  Skin Symptoms: No  HENT Symptoms: No  EYE SYMPTOMS: No  HEART SYMPTOMS: No  LUNG SYMPTOMS: No  INTESTINAL SYMPTOMS: No  URINARY SYMPTOMS: No  REPRODUCTIVE SYMPTOMS: No  SKELETAL SYMPTOMS: No  BLOOD SYMPTOMS: No  NERVOUS SYSTEM SYMPTOMS: No  MENTAL HEALTH SYMPTOMS: No    "

## 2017-05-22 NOTE — PATIENT INSTRUCTIONS
[facility]  Vasectomy Information  [phone]    DATE OF PROCEDURE ___________________________________    TIME OF PROCEDURE ___________________________    TIME TO REPORT FOR PROCEDURE _______________________________    Your Physician:  _____ Moreno Bonds M.D.     _____ Osmani Nixon M.D.     _____ Luis Prather M.D.    LOCATION OF PROCEDURE:     _____ [facility]  _____ [address]   [address]   [address] [facility].  [address]    [place]   [address]    [place]   [address]    Preoperative Instructions:    _____ You may have breakfast on the morning of your procedure.  If your procedure is    in the afternoon, you may have lunch as well.    _____ You must have someone drive you home after the procedure if you have been    prescribed an oral sedative (valium).    _____ Do not take any aspirin, blood-thinning or anti-inflammatory medication for at    least 7-10 days before the procedure (this includes but is not limited to Advil,   Aleve, Ecotrin and Bufferin).      Postoperative Instructions Follow Vasectomy    Under routine circumstances, please note the following:    -No heavy lifting (over 15 lbs) for 48 hour.  -You may shower after 48 hours.  You may have a tub bath or use a swimming pool after one week postoperatively.  Your doctor will advise you if he feels it is helpful to soak in a bathtub postoperatively.  -Do not engage in intercourse for at least ten days and then proceed when comfortable.  -Wear an athletic supporter for 48 hours postoperatively or until any discomfort ceases.  -Your physician will instruct you regarding the use of ice in the recovery room or at home after the procedure, as necessary.  -Please remember as you resume your activity that you may experience some discomfor and/or swelling for the one to two weeks following the procedure.  If this occurs decrease activity and slightly elevate the scrotum (athletic  "supporter).  -As your stitches dissolve it may appear that the incision is \"gaping.\"  This is normal.    Necessary follow-up:  You do not need a follow up appointment unless you have problems after your procedure.  Please call our office at [phone] if you do.    At the time of your procedure you will be given the supplies for your post procedure follow up.  The test should be done AT LEAST THREE MONTHS AFTER your vasecomy.  During this time, be certain to maintain birth control measure!    Between the time of your procedure and the time that you have your first sperm count it is very important that you have a minimum of 30 ejaculations.  If you have any questions about this, please consult your physician.    If the sperm count that is done at three months is negative, you will be considered sterile.  Until, you are told that you are free to discontinue birth control you are considered fertile.    If your sperm counts reveal the presence of sperm, you will be advised to repeat the test until a negative result is obtained.     NOTE:  Please call our office one week after dropping off your sample for the result.  Test results will only be given to the patient.         "

## 2017-05-22 NOTE — MR AVS SNAPSHOT
After Visit Summary   5/22/2017    Phillip Nassar    MRN: [id number]           Patient Information     Date Of Birth          1965        Visit Information        Provider Department      5/22/2017 3:20 PM Luis Prather MD [facility] [place]        Today's Diagnoses     Encounter for sterilization    -  1      Care Instructions    [facility]  Vasectomy Information  [phone]    DATE OF PROCEDURE ___________________________________    TIME OF PROCEDURE ___________________________    TIME TO REPORT FOR PROCEDURE _______________________________    Your Physician:  _____ Moreno Bonds M.D.     _____ Osmani Nixon M.D.     _____ Luis Prather M.D.    LOCATION OF PROCEDURE:     _____ [facility]  _____ [address]   [address]   [address] [facility]  [address]    [place]   [address]    [place]   [address]    Preoperative Instructions:    _____ You may have breakfast on the morning of your procedure.  If your procedure is    in the afternoon, you may have lunch as well.    _____ You must have someone drive you home after the procedure if you have been    prescribed an oral sedative (valium).    _____ Do not take any aspirin, blood-thinning or anti-inflammatory medication for at    least 7-10 days before the procedure (this includes but is not limited to Advil,   Aleve, Ecotrin and Bufferin).      Postoperative Instructions Follow Vasectomy    Under routine circumstances, please note the following:    -No heavy lifting (over 15 lbs) for 48 hour.  -You may shower after 48 hours.  You may have a tub bath or use a swimming pool after one week postoperatively.  Your doctor will advise you if he feels it is helpful to soak in a bathtub postoperatively.  -Do not engage in intercourse for at least ten days and then proceed when comfortable.  -Wear an athletic supporter for 48 hours postoperatively  "or until any discomfort ceases.  -Your physician will instruct you regarding the use of ice in the recovery room or at home after the procedure, as necessary.  -Please remember as you resume your activity that you may experience some discomfor and/or swelling for the one to two weeks following the procedure.  If this occurs decrease activity and slightly elevate the scrotum (athletic supporter).  -As your stitches dissolve it may appear that the incision is \"gaping.\"  This is normal.    Necessary follow-up:  You do not need a follow up appointment unless you have problems after your procedure.  Please call our office at [phone] if you do.    At the time of your procedure you will be given the supplies for your post procedure follow up.  The test should be done AT LEAST THREE MONTHS AFTER your vasecomy.  During this time, be certain to maintain birth control measure!    Between the time of your procedure and the time that you have your first sperm count it is very important that you have a minimum of 30 ejaculations.  If you have any questions about this, please consult your physician.    If the sperm count that is done at three months is negative, you will be considered sterile.  Until, you are told that you are free to discontinue birth control you are considered fertile.    If your sperm counts reveal the presence of sperm, you will be advised to repeat the test until a negative result is obtained.     NOTE:  Please call our office one week after dropping off your sample for the result.  Test results will only be given to the patient.               Follow-ups after your visit        Follow-up notes from your care team     Return for Schedule vasectomy in office.      Your next 10 appointments already scheduled     May 26, 2017  8:30 AM CDT   Vasectomy with Luis Prather MD   [facility] [place] ([facility])    [address] [facility]  [address]  [facility] [id number] [phone]              Who to contact     If you have questions or need follow up information about today's clinic visit or your schedule please contact [facility] [place] directly at [phone].  Normal or non-critical lab and imaging results will be communicated to you by MyChart, letter or phone within 4 business days after the clinic has received the results. If you do not hear from us within 7 days, please contact the clinic through [employer] or phone. If you have a critical or abnormal lab result, we will notify you by phone as soon as possible.  Submit refill requests through [employer] or call your pharmacy and they will forward the refill request to us. Please allow 3 business days for your refill to be completed.          Additional Information About Your Visit        [employer] Information     [employer] gives you secure access to your electronic health record. If you see a primary care provider, you can also send messages to your care team and make appointments. If you have questions, please call your primary care clinic.  If you do not have a primary care provider, please call [phone] and they will assist you.        Care EveryWhere ID     This is your Care EveryWhere ID. This could be used by other organizations to access your [facility] medical records  [id number]        Your Vitals Were     Pulse Height BMI (Body Mass Index)             78 1.753 m (5' 9\") 32.19 kg/m2          Blood Pressure from Last 3 Encounters:   05/22/17 128/78   02/28/17 (!) 127/92   01/23/17 108/74    Weight from Last 3 Encounters:   05/22/17 98.9 kg (218 lb)   01/23/17 99 kg (218 lb 3.2 oz)   01/12/17 100.5 kg (221 lb 9 oz)              Today, you had the following     No orders found for display         Today's Medication Changes          These changes are accurate as of: 5/22/17  3:36 PM.  If you have any questions, ask your nurse or doctor.             "   Stop taking these medicines if you haven't already. Please contact your care team if you have questions.     HYDROcodone-acetaminophen 5-325 MG per tablet   Commonly known as:  NORCO   Stopped by:  Luis Prather MD           vitamin C-electrolytes 1000mg vitamin C super orange drink mix   Commonly known as:  EMERGEN-C   Stopped by:  Luis Prather MD                    Primary Care Provider Office Phone # Fax #    Albert Chapman [phone]       [address] [place] [address]        Thank you!     Thank you for choosing [facility] [place]  for your care. Our goal is always to provide you with excellent care. Hearing back from our patients is one way we can continue to improve our services. Please take a few minutes to complete the written survey that you may receive in the mail after your visit with us. Thank you!             Your Updated Medication List - Protect others around you: Learn how to safely use, store and throw away your medicines at www.disposemymeds.org.          This list is accurate as of: 5/22/17  3:36 PM.  Always use your most recent med list.                   Brand Name Dispense Instructions for use    albuterol 108 (90 BASE) MCG/ACT Inhaler    PROAIR HFA/PROVENTIL HFA/VENTOLIN HFA    3 Inhaler    Inhale 2 puffs into the lungs every 6 hours as needed for shortness of breath / dyspnea       fluticasone 50 MCG/ACT spray    FLONASE    3 Package    Spray 1-2 sprays into both nostrils daily       montelukast 10 MG tablet    SINGULAIR    90 tablet    Take 1 tablet (10 mg) by mouth At Bedtime       ZYRTEC ALLERGY 10 MG Caps   Generic drug:  cetirizine HCl      Take by mouth daily

## 2017-05-22 NOTE — LETTER
5/22/2017       RE: Phillip Nassar  [address]  [place] [address]     Dear Colleague,    Thank you for referring your patient, Phillip Nassar, to the [facility] [place] at [facility]. Please see a copy of my visit note below.    VASECTOMY CONSULTATION NOTE  DATE OF VISIT: 5/22/2017    PATIENT NAME: Phillip Nassar    YOB: 1965    REASON FOR CONSULTATION: Mr. Phillip Nassar is a 51 year old year old gentleman who came to the urology clinic today requesting a vasectomy. He has 2 children and he wishes to have a vasectomy for birth control.     PAST MEDICAL HISTORY:   Past Medical History:   Diagnosis Date     Allergic rhinitis, cause unspecified      Diverticulitis      Mild intermittent asthma      Prostate infection        PAST SURGICAL HISTORY:   Past Surgical History:   Procedure Laterality Date     COLONOSCOPY  3/7/2014    Procedure: COLONOSCOPY;  Colonoscopy  ;  Surgeon: Gucci Martin MD;  Location:  GI     COLONOSCOPY  02/28/2017    Dr. Burks [facility]     COLONOSCOPY N/A 2/28/2017    Procedure: COLONOSCOPY;  Surgeon: Destiny Burks MD;  Location:  GI     ENT SURGERY      Sinus surgery     ORTHOPEDIC SURGERY Left     knee arthroscopic       MEDICATIONS:   Current Outpatient Prescriptions:      cetirizine HCl (ZYRTEC ALLERGY) 10 MG CAPS, Take by mouth daily , Disp: , Rfl:      montelukast (SINGULAIR) 10 MG tablet, Take 1 tablet (10 mg) by mouth At Bedtime, Disp: 90 tablet, Rfl: 3     albuterol (PROAIR HFA, PROVENTIL HFA, VENTOLIN HFA) 108 (90 BASE) MCG/ACT inhaler, Inhale 2 puffs into the lungs every 6 hours as needed for shortness of breath / dyspnea, Disp: 3 Inhaler, Rfl: 1     fluticasone (FLONASE) 50 MCG/ACT nasal spray, Spray 1-2 sprays into both nostrils daily, Disp: 3 Package, Rfl: 3    ALLERGIES:   Allergies   Allergen Reactions     No Known Drug Allergies      FAMILY HISTORY:   Family  "History   Problem Relation Age of Onset     DIABETES Father      Cancer - colorectal Paternal Aunt      Cancer - colorectal Paternal Uncle      Colon Cancer No family hx of      SOCIAL HISTORY:   Social History     Social History     Marital status:      Spouse name: N/A     Number of children: N/A     Years of education: N/A     Occupational History     Not on file.     Social History Main Topics     Smoking status: Never Smoker     Smokeless tobacco: Never Used     Alcohol use No     Drug use: No     Sexual activity: Yes     Partners: Female     Other Topics Concern     Parent/Sibling W/ Cabg, Mi Or Angioplasty Before 65f 55m? No     Social History Narrative       HEIGHT: 5' 9\"     WEIGHT: 218 lbs 0 oz   BP: 128/78    PULSE: 78    EXAM: He is alert and oriented and well-appearing.  Examination of the scrotum reveals normal scrotal skin.  The testicles are normal to palpation bilaterally with no intratesticular lesions.  He has normally palpable vasa bilaterally.    DIAGNOSIS: Request for sterilization    PLAN: The risks of the procedure as well as expectations for recovery and outcomes were splint in detail to him.  He was counseled on the risks for bleeding infection and pain after the procedure.  He was instructed to continue to use contraception until he had proven azoospermia on a semen specimen.  This would normally be collected at least 3 months after the procedure.  He was instructed to hold all anticoagulants medications for one week prior to the procedure.  He was also instructed to shave the scrotum prior to procedure.  It was recommended that he have someone else drive him home after his vasectomy.  In light of these risks and expectations he would like to proceed.  We are scheduling a vasectomy in the office in the near future.    Luis Prather M.D.    Answers for HPI/ROS submitted by the patient on 5/17/2017       "

## 2017-05-25 DIAGNOSIS — Z30.2 ENCOUNTER FOR STERILIZATION: Primary | ICD-10-CM

## 2017-05-26 ENCOUNTER — OFFICE VISIT (OUTPATIENT)
Dept: UROLOGY | Facility: CLINIC | Age: 52
End: 2017-05-26
Payer: COMMERCIAL

## 2017-05-26 VITALS — HEART RATE: 66 BPM | HEIGHT: 69 IN | BODY MASS INDEX: 30.81 KG/M2 | WEIGHT: 208 LBS | OXYGEN SATURATION: 97 %

## 2017-05-26 DIAGNOSIS — Z30.2 ENCOUNTER FOR STERILIZATION: Primary | ICD-10-CM

## 2017-05-26 PROCEDURE — 88302 TISSUE EXAM BY PATHOLOGIST: CPT | Performed by: UROLOGY

## 2017-05-26 PROCEDURE — 55250 REMOVAL OF SPERM DUCT(S): CPT | Performed by: UROLOGY

## 2017-05-26 RX ORDER — HYDROCODONE BITARTRATE AND ACETAMINOPHEN 5; 325 MG/1; MG/1
1 TABLET ORAL EVERY 4 HOURS PRN
Qty: 10 TABLET | Refills: 0 | Status: SHIPPED | OUTPATIENT
Start: 2017-05-26 | End: 2020-12-29

## 2017-05-26 ASSESSMENT — PAIN SCALES - GENERAL
PAINLEVEL: NO PAIN (0)
PAINLEVEL: NO PAIN (0)

## 2017-05-26 NOTE — NURSING NOTE
Prior to the start of the procedure and with procedural staff participation, I verbally confirmed the patient s identity using two indicators, relevant allergies, that the procedure was appropriate and matched the consent or emergent situation, and that the correct equipment/implants were available. Immediately prior to starting the procedure I conducted the Time Out with the procedural staff and re-confirmed the patient s name, procedure, and site/side. (The Joint Commission universal protocol was followed.)  Yes    Sedation (Moderate or Deep): None  Pt has signed the consent form today confirming that a VASECTOMY is the correct procedure today. I verbally confirmed the patient s identity using two indicators, that the pt has started any medication as prescribed for this procedure, relevant allergies, that they have not used blood thinning products in the last 7 - 10 days, and that the correct equipment was available. Immediately prior to starting the procedure I conducted the Time Out with the MD and re-confirmed the patient s name and procedure. Pathology ordered and sent to lab. Post-procedure information, also specimen collection cup with instructions, given to pt at time of check out.    JANUSZ Lockwood CMA

## 2017-05-26 NOTE — LETTER
5/26/2017       RE: Phillip Nassar  [address] [place] [place] [address]     Dear Colleague,    Thank you for referring your patient, Phillip Nassar, to the [facility] [place] at [facility]. Please see a copy of my visit note below.    OFFICE VASECTOMY OPERATIVE NOTE    DATE: 05/26/17  PATIENT: Phillip Nassar    YOB: 1965    Phillip Nassar is a 51 year old male.  He has 2 children and he wishes a vasectomy for birth control.  He has read the brochure and he has shaved himself.  I reviewed the vasectomy procedure with him explaining that it would be done with a local anesthetic given just in the location where the vasectomy would be done.  It would be done through scalpel-less incisions with the removal of segments of the vasa, cauterization of the ends, and burying the ends separate with sutures.      Pt. Understands:  1/1000-1/3000 risk of future pregnancy even with perfectly done vasectomy  -vasectomy is a permanent procedure    -he may cryopreserve sperm if he wishes   -1-5% risk of post-vasectomy pain syndrome   -1-5% risk of complication, primarily infection or bleeding  - he needs to have a semen sample that shows no sperm before getting approval for unprotected intercourse.      Complications such as bleeding, infection, and damage to other tissues in the area were discussed.  I recommended that an ice bag be placed on the scrotum off and on tonight to help reduce pain and swelling.      He was reminded that he was not sterile immediately after the vasectomy that it would take at least 20 ejaculations to empty the vas of any remaining sperm.  He was not to provide a semen sample until after the 20th ejaculation and not before 12 weeks after the vas. He was  to fulfill both of those requirements.   He understands it is his responsibility to find out the results of the vas before proceeding with intercourse  without birth control protection.  Other items discussed were activity afterwards, returning to work, voluntary physical activity,  resuming sexual activity, clothing to wear, bathing, and care of the vas site and expected changes in the site as healing progresses.  After signing the permit, bilateral vasectomy was done as described through scalpel-less incisions.       ANESTHESIA: Local    DETAILS OF PROCEDURE: The risks of the procedure were explained in detail to the patient and informed consent was obtained. The patient was placed supine on the procedure table and the penis and scrotum were prepped and draped in the standard sterile fashion. The right vas deferens was isolated and brought up to the median raphe of the scrotum. 1% lidocaine local anesthesia was used to infiltrate the skin and the spermatic cord. A sharp hemostat was used to make a skin puncture. Adventitial tissues were swept away from the vas. A 1 cm segment of the vas was excised and sent for pathology. The proximal and distal lumina of the vas were cauterized and then each segment was tied off in a knuckling-fashion with a 3-0 chromic suture. Hemostasis was ensured and the segments were released back into the scrotum. Next the left vas was brought up to the same incision and a vasectomy was performed in the similar fashion. At the end of the procedure a single 3-0 chromic suture was placed in the skin.     COMPLICATIONS: None    TAKE HOME MEDICATIONS: Vicodin, 1-2 tabs every 6 hours, PRN    DISMISSAL INSTRUCTIONS:  - Ice pack to scrotum 15 to 20 minutes each hour awake for 36 to 40 hours.  - No strenuous activity or ejaculation for 14 days.  - No unprotected sexual activity until proven azoospermia on semen samples at 3 months.  - Referred to patient handout for normal postop expectations and indications to contact nurse or physician.    M.D.: Luis Prather M.D.

## 2017-05-26 NOTE — PATIENT INSTRUCTIONS
POST VASECTOMY INSTRUCTIONS    1.) If you have any concerns or questions, please contact our office at [phone].     2.) It is okay to take a shower, however, do not soak in water (bath,swimming, hot tub,etc....) until your incision is healed.    3.) You might notice some swelling, mild bruising, and discomfort for several days after your vasectomy. This is to be expected. For at least the next 24 hours, an ice pack should be applied for 20 minutes every hour that you are awake. Ice will help with discomfort and swelling. Do not place directly on the skin.    4.) No intercourse, strenuous activity or exercise for at least 7-10 days, even if you feel fine.    5.) You need to wear good scrotal support while you are healing. We strongly recommend an athletic supporter or a pair of regular briefs that are one size too small. Boxer briefs do not offer enough support.    6.) Tylenol as directed on the bottle is preferred for discomfort. Please avoid any blood thinning products such as ibuprofen and aspirin (Motrin, Advil, Excedrin, Aleve, ect..) for at least the next week.    7.) It is normal to have mild drainage from the incision area for several days. However, please contact our office if you notice: bright red blood that does not stop after three days, increased pain, heat at the incision, red streaks, foul smelling discharge, or if you start to run a fever.     8.) YOU MUST CONTINUE BIRTH CONTROL UNTIL WE CONFIRM YOUR STERILITY.  This process can take up to a year to complete (rare occurrence).     9.) You have been given a form with specimen cup and instructions for your follow up specimen. You will be cleared once we receive ONE negative specimen. If your specimen comes back positive (sperm seen) you will be asked to repeat the test. This does not mean that your vasectomy has failed.

## 2017-05-26 NOTE — PROGRESS NOTES
OFFICE VASECTOMY OPERATIVE NOTE    DATE: 05/26/17  PATIENT: Phillip Nassar    YOB: 1965    Phillip Nassar is a 51 year old male.  He has 2 children and he wishes a vasectomy for birth control.  He has read the brochure and he has shaved himself.  I reviewed the vasectomy procedure with him explaining that it would be done with a local anesthetic given just in the location where the vasectomy would be done.  It would be done through scalpel-less incisions with the removal of segments of the vasa, cauterization of the ends, and burying the ends separate with sutures.      Pt. Understands:  1/1000-1/3000 risk of future pregnancy even with perfectly done vasectomy  -vasectomy is a permanent procedure    -he may cryopreserve sperm if he wishes   -1-5% risk of post-vasectomy pain syndrome   -1-5% risk of complication, primarily infection or bleeding  - he needs to have a semen sample that shows no sperm before getting approval for unprotected intercourse.      Complications such as bleeding, infection, and damage to other tissues in the area were discussed.  I recommended that an ice bag be placed on the scrotum off and on tonight to help reduce pain and swelling.      He was reminded that he was not sterile immediately after the vasectomy that it would take at least 20 ejaculations to empty the vas of any remaining sperm.  He was not to provide a semen sample until after the 20th ejaculation and not before 12 weeks after the vas. He was  to fulfill both of those requirements.   He understands it is his responsibility to find out the results of the vas before proceeding with intercourse without birth control protection.  Other items discussed were activity afterwards, returning to work, voluntary physical activity,  resuming sexual activity, clothing to wear, bathing, and care of the vas site and expected changes in the site as healing progresses.  After signing the permit, bilateral vasectomy was done as  described through scalpel-less incisions.       ANESTHESIA: Local    DETAILS OF PROCEDURE: The risks of the procedure were explained in detail to the patient and informed consent was obtained. The patient was placed supine on the procedure table and the penis and scrotum were prepped and draped in the standard sterile fashion. The right vas deferens was isolated and brought up to the median raphe of the scrotum. 1% lidocaine local anesthesia was used to infiltrate the skin and the spermatic cord. A sharp hemostat was used to make a skin puncture. Adventitial tissues were swept away from the vas. A 1 cm segment of the vas was excised and sent for pathology. The proximal and distal lumina of the vas were cauterized and then each segment was tied off in a knuckling-fashion with a 3-0 chromic suture. Hemostasis was ensured and the segments were released back into the scrotum. Next the left vas was brought up to the same incision and a vasectomy was performed in the similar fashion. At the end of the procedure a single 3-0 chromic suture was placed in the skin.     COMPLICATIONS: None    TAKE HOME MEDICATIONS: Vicodin, 1-2 tabs every 6 hours, PRN    DISMISSAL INSTRUCTIONS:  - Ice pack to scrotum 15 to 20 minutes each hour awake for 36 to 40 hours.  - No strenuous activity or ejaculation for 14 days.  - No unprotected sexual activity until proven azoospermia on semen samples at 3 months.  - Referred to patient handout for normal postop expectations and indications to contact nurse or physician.    M.D.: Luis Prather M.D.

## 2017-05-26 NOTE — NURSING NOTE
The following medication was given:     MEDICATION:  8.4% sodium bicarbonate  ROUTE: inject  SITE: scrotum  DOSE: 50mEq  LOT #: -ev  : Destiny  EXPIRATION DATE: 1aug2017  NDC#: 2042-8387-15  Was there drug waste? Yes  Amount of drug waste (mL): 45.  Reason for waste:  Single use vial      Isaiah Lockwood CMA  May 26, 2017

## 2017-05-26 NOTE — MR AVS SNAPSHOT
After Visit Summary   5/26/2017    Phillip Nassar    MRN: [id number]           Patient Information     Date Of Birth          1965        Visit Information        Provider Department      5/26/2017 8:30 AM Luis Prather MD [facility] [place]        Today's Diagnoses     Encounter for sterilization    -  1      Care Instructions    POST VASECTOMY INSTRUCTIONS    1.) If you have any concerns or questions, please contact our office at [phone].     2.) It is okay to take a shower, however, do not soak in water (bath,swimming, hot tub,etc....) until your incision is healed.    3.) You might notice some swelling, mild bruising, and discomfort for several days after your vasectomy. This is to be expected. For at least the next 24 hours, an ice pack should be applied for 20 minutes every hour that you are awake. Ice will help with discomfort and swelling. Do not place directly on the skin.    4.) No intercourse, strenuous activity or exercise for at least 7-10 days, even if you feel fine.    5.) You need to wear good scrotal support while you are healing. We strongly recommend an athletic supporter or a pair of regular briefs that are one size too small. Boxer briefs do not offer enough support.    6.) Tylenol as directed on the bottle is preferred for discomfort. Please avoid any blood thinning products such as ibuprofen and aspirin (Motrin, Advil, Excedrin, Aleve, ect..) for at least the next week.    7.) It is normal to have mild drainage from the incision area for several days. However, please contact our office if you notice: bright red blood that does not stop after three days, increased pain, heat at the incision, red streaks, foul smelling discharge, or if you start to run a fever.     8.) YOU MUST CONTINUE BIRTH CONTROL UNTIL WE CONFIRM YOUR STERILITY.  This process can take up to a year to complete (rare occurrence).     9.) You have been  given a form with specimen cup and instructions for your follow up specimen. You will be cleared once we receive ONE negative specimen. If your specimen comes back positive (sperm seen) you will be asked to repeat the test. This does not mean that your vasectomy has failed.             Follow-ups after your visit        Future tests that were ordered for you today     Open Future Orders        Priority Expected Expires Ordered    Semen Analysis Post Vasectomy [XSP0771] Routine  5/25/2018 5/25/2017    Surgical pathology exam [[id number]] Routine  5/25/2018 5/25/2017            Who to contact     If you have questions or need follow up information about today's clinic visit or your schedule please contact [facility] [place] directly at [phone].  Normal or non-critical lab and imaging results will be communicated to you by [employer], letter or phone within 4 business days after the clinic has received the results. If you do not hear from us within 7 days, please contact the clinic through [employer] or phone. If you have a critical or abnormal lab result, we will notify you by phone as soon as possible.  Submit refill requests through [employer] or call your pharmacy and they will forward the refill request to us. Please allow 3 business days for your refill to be completed.          Additional Information About Your Visit        [employer] Information     [employer] gives you secure access to your electronic health record. If you see a primary care provider, you can also send messages to your care team and make appointments. If you have questions, please call your primary care clinic.  If you do not have a primary care provider, please call [phone] and they will assist you.        Care EveryWhere ID     This is your Care EveryWhere ID. This could be used by other organizations to access your [facility] medical records  [id number]        Your Vitals Were     Pulse Height Pulse Oximetry  "BMI (Body Mass Index)          66 1.753 m (5' 9\") 97% 30.72 kg/m2         Blood Pressure from Last 3 Encounters:   05/22/17 128/78   02/28/17 (!) 127/92   01/23/17 108/74    Weight from Last 3 Encounters:   05/26/17 94.3 kg (208 lb)   05/22/17 98.9 kg (218 lb)   01/23/17 99 kg (218 lb 3.2 oz)              Today, you had the following     No orders found for display         Today's Medication Changes          These changes are accurate as of: 5/26/17  9:01 AM.  If you have any questions, ask your nurse or doctor.               Start taking these medicines.        Dose/Directions    HYDROcodone-acetaminophen 5-325 MG per tablet   Commonly known as:  NORCO   Used for:  Encounter for sterilization        Dose:  1 tablet   Take 1 tablet by mouth every 4 hours as needed for pain maximum 6 tablet(s) per day   Quantity:  10 tablet   Refills:  0            Where to get your medicines      Some of these will need a paper prescription and others can be bought over the counter.  Ask your nurse if you have questions.     Bring a paper prescription for each of these medications     HYDROcodone-acetaminophen 5-325 MG per tablet                Primary Care Provider Office Phone # Fax #    Albert Chapman [phone]       [address] [place] [address]        Thank you!     Thank you for choosing [facility] [place]  for your care. Our goal is always to provide you with excellent care. Hearing back from our patients is one way we can continue to improve our services. Please take a few minutes to complete the written survey that you may receive in the mail after your visit with us. Thank you!             Your Updated Medication List - Protect others around you: Learn how to safely use, store and throw away your medicines at www.disposemymeds.org.          This list is accurate as of: 5/26/17  9:01 AM.  Always use your most recent med list.    "                Brand Name Dispense Instructions for use    albuterol 108 (90 BASE) MCG/ACT Inhaler    PROAIR HFA/PROVENTIL HFA/VENTOLIN HFA    3 Inhaler    Inhale 2 puffs into the lungs every 6 hours as needed for shortness of breath / dyspnea       fluticasone 50 MCG/ACT spray    FLONASE    3 Package    Spray 1-2 sprays into both nostrils daily       HYDROcodone-acetaminophen 5-325 MG per tablet    NORCO    10 tablet    Take 1 tablet by mouth every 4 hours as needed for pain maximum 6 tablet(s) per day       montelukast 10 MG tablet    SINGULAIR    90 tablet    Take 1 tablet (10 mg) by mouth At Bedtime       ZYRTEC ALLERGY 10 MG Caps   Generic drug:  cetirizine HCl      Take by mouth daily

## 2017-05-30 LAB — COPATH REPORT: NORMAL

## 2017-06-09 ENCOUNTER — OFFICE VISIT (OUTPATIENT)
Dept: ORTHOPEDICS | Facility: CLINIC | Age: 52
End: 2017-06-09
Payer: COMMERCIAL

## 2017-06-09 VITALS
BODY MASS INDEX: 32.13 KG/M2 | DIASTOLIC BLOOD PRESSURE: 88 MMHG | WEIGHT: 212 LBS | HEIGHT: 68 IN | SYSTOLIC BLOOD PRESSURE: 128 MMHG

## 2017-06-09 DIAGNOSIS — M25.512 ACUTE PAIN OF LEFT SHOULDER: Primary | ICD-10-CM

## 2017-06-09 PROCEDURE — 99214 OFFICE O/P EST MOD 30 MIN: CPT | Mod: 25 | Performed by: FAMILY MEDICINE

## 2017-06-09 PROCEDURE — 20611 DRAIN/INJ JOINT/BURSA W/US: CPT | Mod: LT | Performed by: FAMILY MEDICINE

## 2017-06-09 NOTE — PATIENT INSTRUCTIONS
Thank you for allowing us to participate in your care today.  Please find below your visit diagnosis and the plan going forward.    1. Left shoulder pain, unspecified chronicity      Steroid injection of the left shoulder: intra-articular  was performed today in clinic    - Would not soak in a hot tub, bath or swimming pool for 48 hours  - Ok to shower  - The lidocaine (what is giving you pain relief right now) will likely stop working in 1-2 hours.  You will then have pain again, similar to before you received the injection. The corticosteroid will not start working until approximately 1-2 weeks from now.  - Ice today and only do your normal amounts of activity    Follow up as needed or sooner if needed. Call direct clinic number [[phone]] at any time with questions or concerns.    Ladi Pugh DO CAQSM  [facility]  Website: [email or web address]  Twitter: [email or web address]

## 2017-06-09 NOTE — MR AVS SNAPSHOT
After Visit Summary   6/9/2017    Phillip Nassar    MRN: [id number]           Patient Information     Date Of Birth          1965        Visit Information        Provider Department      6/9/2017 2:20 PM Ladi Pugh DO [facility]        Today's Diagnoses     Left shoulder pain, unspecified chronicity    -  1      Care Instructions    Thank you for allowing us to participate in your care today.  Please find below your visit diagnosis and the plan going forward.    1. Left shoulder pain, unspecified chronicity      Steroid injection of the left shoulder: intra-articular  was performed today in clinic    - Would not soak in a hot tub, bath or swimming pool for 48 hours  - Ok to shower  - The lidocaine (what is giving you pain relief right now) will likely stop working in 1-2 hours.  You will then have pain again, similar to before you received the injection. The corticosteroid will not start working until approximately 1-2 weeks from now.  - Ice today and only do your normal amounts of activity    Follow up as needed or sooner if needed. Call direct clinic number [[phone]] at any time with questions or concerns.    Ladi Pugh DO CAQSM  [facility]  Website: [email or web address]  Twitter: [email or web address]            Follow-ups after your visit        Who to contact     If you have questions or need follow up information about today's clinic visit or your schedule please contact [facility] directly at [phone].  Normal or non-critical lab and imaging results will be communicated to you by MyChart, letter or phone within 4 business days after the clinic has received the results. If you do not hear from us within 7 days, please contact the clinic through [employer] or phone. If you have a critical or abnormal lab result, we will notify you by phone as soon as possible.  Submit refill requests through [employer] or  "call your pharmacy and they will forward the refill request to us. Please allow 3 business days for your refill to be completed.          Additional Information About Your Visit        MyChart Information     [employer] gives you secure access to your electronic health record. If you see a primary care provider, you can also send messages to your care team and make appointments. If you have questions, please call your primary care clinic.  If you do not have a primary care provider, please call [phone] and they will assist you.        Care EveryWhere ID     This is your Care EveryWhere ID. This could be used by other organizations to access your [facility] medical records  [id number]        Your Vitals Were     Height BMI (Body Mass Index)                5' 8\" (1.727 m) 32.23 kg/m2           Blood Pressure from Last 3 Encounters:   06/09/17 128/88   05/22/17 128/78   02/28/17 (!) 127/92    Weight from Last 3 Encounters:   06/09/17 212 lb (96.2 kg)   05/26/17 208 lb (94.3 kg)   05/22/17 218 lb (98.9 kg)              Today, you had the following     No orders found for display       Primary Care Provider Office Phone # Fax #    Albert Chapman [phone]       [address] [place] [address]        Thank you!     Thank you for choosing [facility]  for your care. Our goal is always to provide you with excellent care. Hearing back from our patients is one way we can continue to improve our services. Please take a few minutes to complete the written survey that you may receive in the mail after your visit with us. Thank you!             Your Updated Medication List - Protect others around you: Learn how to safely use, store and throw away your medicines at www.disposemymeds.org.          This list is accurate as of: 6/9/17  3:14 PM.  Always use your most recent med list.                   Brand Name Dispense Instructions for use    albuterol " 108 (90 BASE) MCG/ACT Inhaler    PROAIR HFA/PROVENTIL HFA/VENTOLIN HFA    3 Inhaler    Inhale 2 puffs into the lungs every 6 hours as needed for shortness of breath / dyspnea       fluticasone 50 MCG/ACT spray    FLONASE    3 Package    Spray 1-2 sprays into both nostrils daily       HYDROcodone-acetaminophen 5-325 MG per tablet    NORCO    10 tablet    Take 1 tablet by mouth every 4 hours as needed for pain maximum 6 tablet(s) per day       montelukast 10 MG tablet    SINGULAIR    90 tablet    Take 1 tablet (10 mg) by mouth At Bedtime       ZYRTEC ALLERGY 10 MG Caps   Generic drug:  cetirizine HCl      Take by mouth daily

## 2017-06-09 NOTE — PROGRESS NOTES
"ASSESSMENT & PLAN    ICD-10-CM    1. Left shoulder pain, unspecified chronicity M25.512 [facility] REFERRAL     HC ARTHROCENTESIS ASPIR&/INJ MAJOR JT/BURSA W/US     METHYLPREDNISOLONE 40 MG INJ     methylPREDNISolone acetate (DEPO-MEDROL) 40 MG/ML injection   Less likely impingement  Pain at night could be related to GH pathology  Pushing for IA injection and completed today although doubt long term efficacy  Recommend PT     Follow up as needed or sooner if needed. Call direct clinic number [[phone]] at any time with questions or concerns.Instructed to call the office if the condition evolves or worsens.    -----    SUBJECTIVE  Phillip Nassar is a/an 51 year old right hand dominant male who is seen as self referral for evaluation of left shoulder pain. The patient is seen by themselves.    Onset: 2 week(s) ago. Reports insidious onset without acute precipitating event.  Worsened by: pulling/pushing  Better with: sleeping with shoulders rounded  Quality: aching/pressure, constant, throbbing, dull, sharp, intermittent  Pain Scale (maximum/current)/10: 7/10 / 5/10  Treatments tried: ice and ibuprofen  Orthopedic history: YES on the right side - Date: 4/2015 and saw Dr. 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    REVIEW OF SYSTEMS:  10 point ROS is negative other than symptoms noted above in HPI, Past Medical History or as stated below  Constitutional: NEGATIVE for fever, chills, change in weight  Skin: NEGATIVE for worrisome rashes, moles or lesions  GI/: NEGATIVE for bowel or bladder changes  Neuro: NEGATIVE for weakness, dizziness or paresthesias    OBJECTIVE:  /88  Ht 5' 8\" (1.727 m)  Wt 212 lb (96.2 kg)  BMI 32.23 kg/m2   General: " healthy, alert and in no distress  HEENT: no scleral icterus or conjunctival erythema  Skin: no suspicious lesions or rash. No jaundice.  CV: regular rhythm by palpation  Resp: normal respiratory effort without conversational dyspnea   Psych: normal mood and affect  Gait: normal steady gait with appropriate coordination and balance  Neuro: normal light touch sensory exam of the bilateral upper extremities.  Motor strength as noted below.  MSK:  LEFT SHOULDER  Inspection:    no atrophy  Palpation:    Tender about the infraspinatus insertion. Remainder of bony and tendinous landmarks are nontender.  Active Range of Motion:     Abduction 1650, FF 1650,     Strength:    Scapular plane abduction 5-/5,  ER 5-/5, IR 5-/5, biceps 5-/5  Special Tests:    Negative: Adame', supraspinatus (empty can) and drop arm/painful arc    Independent visualization of the below image:  None indicated at this time     Left Glenohumeral Joint Corticosteroid Injection     Diagnoses (preoperative and postoperative): Left shoulder pain / Left shoulder pain, unspecified chronicity  (primary encounter diagnosis)  Current Procedure (include preoperative): Sonographically guided left glenohumeral joint corticosteroid injection  Current Indication (include preoperative): Alleviation of pain  REASON FOR PROCEDURE: Phillip has requested a left glenohumeral joint corticosteroid injection for alleviation of pain. Sonographic guidance will be used to ensure accurate placement of the medication within the joint space.  PATIENT EDUCATION: Ready to learn with no apparent learning barriers identified. Learning preferences include listening. Explained diagnosis and treatment plan as well as treatment alternatives. Patient expressed understanding of the content.  Following denial of allergy and review of potential side effects and complications including but not necessarily limited to infection, bleeding, allergic reaction, post-injection flare, local  tissue breakdown (including but not limited to potential for skin depigmentation and/or subcutaneous fat atrophy), systemic effects of corticosteroids, elevation of blood glucose, injury to soft tissue and/or nerves and seizure, patient indicated their understanding and agreed to proceed. Written and signed consent form has been obtained and is scanned into the chart.  PROCEDURE: Prior to the procedure, the left posterior glenohumeral joint was examined with a 4 MHz curvilinear transducer to visualize the joint space and determine the approach for the procedure.  Procedure was carried out using sterile technique including Chloraprep, a sterile transducer cover, and a sterile transducer gel. A simple surgical tray was used.  PROCEDURAL PAUSE: Procedural pause conducted to verify correct patient identity, procedure to be performed, and as applicable, correct side/site, correct patient position, availability of implants, special equipment, or special requirements.  Patient position: right lateral recumbent  Transducer type: 4 MHz curvilinear transducer  Approach: Lateral to medial parallel to long axis of transducer  Local Anesthesia: Sonographically guided 22-gauge 2.5 inch needle was directly visualized anesthetizing the skin and subcutaneous tissue with 5 ml of 1% Lidocaine   Injectate:  Sonographically guided  22-gauge 2.5 inch needle was entered down into the glenohumeral joint between the labrum and articular cartilage.  After confirming needle tip position a solution of 1 ml of 40 mg/ml Depo Medrol and 4 ml of 1% Lidocaine was injected after reconfirming needle tip position under low resistance and seen flowing into the joint space.  AFTERCARE:  Patient tolerated the procedure without complication. After a short observation period, patient was discharged under their own power and in excellent condition.    Patient noted to have 5/10 pain before the procedure and 3-4/10 pain after completion of the  procedure.  Patient's conditions were thoroughly discussed during today's visit with greater than 50% of the visit spent counseling the patient with total time spent face-to-face with the patient being 20 minutes with injection taking 5 minutes.    Ladi Pugh DO [facility]

## 2017-06-09 NOTE — NURSING NOTE
"Chief Complaint   Patient presents with     Musculoskeletal Problem       Initial /88  Ht 5' 8\" (1.727 m)  Wt 212 lb (96.2 kg)  BMI 32.23 kg/m2 Estimated body mass index is 32.23 kg/(m^2) as calculated from the following:    Height as of this encounter: 5' 8\" (1.727 m).    Weight as of this encounter: 212 lb (96.2 kg).  Medication Reconciliation: complete     Davi Junior ATC    "

## 2017-06-15 RX ORDER — METHYLPREDNISOLONE ACETATE 40 MG/ML
40 INJECTION, SUSPENSION INTRA-ARTICULAR; INTRALESIONAL; INTRAMUSCULAR; SOFT TISSUE ONCE
Qty: 1 ML | Refills: 0 | OUTPATIENT
Start: 2017-06-15 | End: 2017-06-15

## 2017-06-17 ENCOUNTER — RADIANT APPOINTMENT (OUTPATIENT)
Dept: GENERAL RADIOLOGY | Facility: CLINIC | Age: 52
End: 2017-06-17
Attending: FAMILY MEDICINE
Payer: COMMERCIAL

## 2017-06-17 ENCOUNTER — OFFICE VISIT (OUTPATIENT)
Dept: ORTHOPEDICS | Facility: CLINIC | Age: 52
End: 2017-06-17
Payer: COMMERCIAL

## 2017-06-17 VITALS — BODY MASS INDEX: 32.13 KG/M2 | WEIGHT: 212 LBS | HEIGHT: 68 IN | RESPIRATION RATE: 14 BRPM

## 2017-06-17 DIAGNOSIS — M62.838 MUSCLE SPASM: ICD-10-CM

## 2017-06-17 DIAGNOSIS — M54.12 CERVICAL RADICULAR PAIN: ICD-10-CM

## 2017-06-17 DIAGNOSIS — M54.12 CERVICAL RADICULAR PAIN: Primary | ICD-10-CM

## 2017-06-17 PROCEDURE — 72040 X-RAY EXAM NECK SPINE 2-3 VW: CPT

## 2017-06-17 PROCEDURE — 99214 OFFICE O/P EST MOD 30 MIN: CPT | Performed by: FAMILY MEDICINE

## 2017-06-17 RX ORDER — PREDNISONE 20 MG/1
40 TABLET ORAL DAILY
Qty: 10 TABLET | Refills: 0 | Status: SHIPPED | OUTPATIENT
Start: 2017-06-17 | End: 2017-06-22

## 2017-06-17 RX ORDER — CYCLOBENZAPRINE HCL 10 MG
10 TABLET ORAL
Qty: 14 TABLET | Refills: 0 | Status: SHIPPED | OUTPATIENT
Start: 2017-06-17 | End: 2017-06-30

## 2017-06-17 NOTE — PROGRESS NOTES
"ASSESSMENT & PLAN    ICD-10-CM    1. Cervical radicular pain M54.12 XR Cervical Spine 2/3 Views     predniSONE (DELTASONE) 20 MG tablet     [facility] REFERRAL     cyclobenzaprine (FLEXERIL) 10 MG tablet   2. Muscle spasm M62.838 cyclobenzaprine (FLEXERIL) 10 MG tablet   Physical therapy: [facility] - [phone]  Will see if walk-in spine is available for today  Prescription Medication as directed: Prednisone.   Rx for Flexeril.     Follow up in 1-2 weeks. Call direct clinic number [[phone]] at any time with questions or concerns. Instructed to call the office if the condition evolves or worsens.    -----    SUBJECTIVE:  Phillip Nassar is a 51 year old male who is seen in follow-up for left shoulder, cervical, and periscapular pain. They were last seen 6/9/2017 for IA shoulder inj.  Since then, shoulder pain has been relieved but has developed cervical / periscapular pain.    Since their last visit reports 40% improvement. They indicate that their pain level is 5/10. They have tried rest/activity avoidance.      Patient's past medical, surgical, social, and family histories were reviewed today and no changes are noted.    REVIEW OF SYSTEMS:  Constitutional: NEGATIVE for fever, chills, change in weight  Skin: NEGATIVE for worrisome rashes, moles or lesions  GI/: NEGATIVE for bowel or bladder changes  Neuro: NEGATIVE for weakness, dizziness or paresthesias    OBJECTIVE:  Resp 14  Ht 5' 8\" (1.727 m)  Wt 212 lb (96.2 kg)  BMI 32.23 kg/m2   General: healthy, alert and in no distress  HEENT: no scleral icterus or conjunctival erythema  Skin: no suspicious lesions or rash. No jaundice.  CV: no pedal edema  Resp: normal respiratory effort without conversational dyspnea   Psych: normal mood and affect  Gait: normal steady gait with appropriate coordination and balance  Neuro: normal light touch sensory exam of the extremities.    MSK:  CERVICAL SPINE  Inspection:    " Rounded shoulders, forward head  Palpation:    Tender about the upper trapezius (left), rhomboids (left) and medial border of scapula. Otherwise remainder of the landmarks and nontender.  Range of Motion:     Flexion limited by pain    Extension limited by pain  Strength:    Deltoid (C5) 5/5, biceps (C6) 5/5, wrist extension (C6) 5/5, triceps (C7) 5/5, wrist flexion (C7) 5/5    Independent visualization of the below image:  None indicated at this time    Patient's conditions were thoroughly discussed during today's visit with greater than 50% of the visit spent counseling the patient with total time spent face-to-face with the patient being 20 minutes.    Ladi Pugh DO [facility]

## 2017-06-17 NOTE — MR AVS SNAPSHOT
After Visit Summary   6/17/2017    Phillip Nassar    MRN: [id number]           Patient Information     Date Of Birth          1965        Visit Information        Provider Department      6/17/2017 10:40 AM Ladi Pugh DO [facility]        Today's Diagnoses     Cervical radicular pain    -  1    Muscle spasm          Care Instructions    Thank you for allowing us to participate in your care today.  Please find below your visit diagnosis and the plan going forward.    1. Cervical radicular pain      Activity modification as discussed - continue   Physical therapy: [facility] - [phone]  Will see if walk-in spine is available for today  Prescription Medication as directed: Prednisone. Take in the AM and with food. No other anti-inflammatories.  Rx for Flexeril. Take at night. No alcohol or driving while taking.    Follow up in 1-2 weeks. Call direct clinic number [[phone]] at any time with questions or concerns.    Ladi Pugh DO [facility]  Website: [email or web address]  Twitter: [email or web address]            Follow-ups after your visit        Additional Services     [facility] PT, HAND, AND CHIROPRACTIC REFERRAL       **This order will print in the [facility] Scheduling Office**    Physical Therapy, Hand Therapy and Chiropractic Care are available through:    *[facility]  *[facility]  *[facility]    Call one number to schedule at any of the above locations: [phone].    Your provider has referred you to: Physical Therapy at [facility] or [facility]    Indication/Reason for Referral: Neck Pain - L radic  Onset of Illness: see chart  Therapy Orders: Evaluate and Treat  Special Programs: None  Special Request: MDT PT please - Alycia Washburn, Cora Horvath, Taryn Florence or Cora Wolf      Additional Comments for the Therapist or Chiropractor: Formal  physical therapy - specific exercises to include centralization with flexion/extension activities with mobilization/manipulation and core stabilization with use of modalities (ie ice, ultrasound, electrical stimulation, etc.) as needed with home exercise prescription.    Please be aware that coverage of these services is subject to the terms and limitations of your health insurance plan.  Call member services at your health plan with any benefit or coverage questions.      Please bring the following to your appointment:    *Your personal calendar for scheduling future appointments  *Comfortable clothing                  Who to contact     If you have questions or need follow up information about today's clinic visit or your schedule please contact [facility] SP[facility] MEDIC[facility] directly at [phone].  Normal or non-critical lab and imaging results will be communicated to you by MyChart, letter or phone within 4 business days after the clinic has received the results. If you do not hear from us within 7 days, please contact the clinic through NiteTableshart or phone. If you have a critical or abnormal lab result, we will notify you by phone as soon as possible.  Submit refill requests through [employer] or call your pharmacy and they will forward the refill request to us. Please allow 3 business days for your refill to be completed.          Additional Information About Your Visit        [email or web address] Information     [employer] gives you secure access to your electronic health record. If you see a primary care provider, you can also send messages to your care team and make appointments. If you have questions, please call your primary care clinic.  If you do not have a primary care provider, please call [phone] and they will assist you.        Care EveryWhere ID     This is your Care EveryWhere ID. This could be used by other organizations to access your [facility] medical records  [id number]        Your Vitals Were      "Respirations Height BMI (Body Mass Index)             14 5' 8\" (1.727 m) 32.23 kg/m2          Blood Pressure from Last 3 Encounters:   06/09/17 128/88   05/22/17 128/78   02/28/17 (!) 127/92    Weight from Last 3 Encounters:   06/17/17 212 lb (96.2 kg)   06/09/17 212 lb (96.2 kg)   05/26/17 208 lb (94.3 kg)              We Performed the Following     [facility] PT, HAND, AND CHIROPRACTIC REFERRAL          Today's Medication Changes          These changes are accurate as of: 6/17/17 11:22 AM.  If you have any questions, ask your nurse or doctor.               Start taking these medicines.        Dose/Directions    cyclobenzaprine 10 MG tablet   Commonly known as:  FLEXERIL   Used for:  Cervical radicular pain, Muscle spasm   Started by:  Ladi Pugh DO        Dose:  10 mg   Take 1 tablet (10 mg) by mouth nightly as needed for muscle spasms   Quantity:  14 tablet   Refills:  0       predniSONE 20 MG tablet   Commonly known as:  DELTASONE   Used for:  Cervical radicular pain   Started by:  Ladi Pugh DO        Dose:  40 mg   Take 2 tablets (40 mg) by mouth daily for 5 days   Quantity:  10 tablet   Refills:  0            Where to get your medicines      These medications were sent to [facility]/pharmacy #[id number] - [place], [place] - [address] AT [address], [place] [address]     Phone:  [phone]     cyclobenzaprine 10 MG tablet    predniSONE 20 MG tablet                Primary Care Provider Office Phone # Fax #    Albert Chapman [phone]       [facility] [address] [place] [address]        Thank you!     Thank you for choosing [facility] SPORTS [facility]  for your care. Our goal is always to provide you with excellent care. Hearing back from our patients is one way we can continue to improve our services. Please take a few minutes to complete the written survey that you may receive in the mail after your visit with " us. Thank you!             Your Updated Medication List - Protect others around you: Learn how to safely use, store and throw away your medicines at www.disposemymeds.org.          This list is accurate as of: 6/17/17 11:22 AM.  Always use your most recent med list.                   Brand Name Dispense Instructions for use    albuterol 108 (90 BASE) MCG/ACT Inhaler    PROAIR HFA/PROVENTIL HFA/VENTOLIN HFA    3 Inhaler    Inhale 2 puffs into the lungs every 6 hours as needed for shortness of breath / dyspnea       cyclobenzaprine 10 MG tablet    FLEXERIL    14 tablet    Take 1 tablet (10 mg) by mouth nightly as needed for muscle spasms       fluticasone 50 MCG/ACT spray    FLONASE    3 Package    Spray 1-2 sprays into both nostrils daily       HYDROcodone-acetaminophen 5-325 MG per tablet    NORCO    10 tablet    Take 1 tablet by mouth every 4 hours as needed for pain maximum 6 tablet(s) per day       montelukast 10 MG tablet    SINGULAIR    90 tablet    Take 1 tablet (10 mg) by mouth At Bedtime       predniSONE 20 MG tablet    DELTASONE    10 tablet    Take 2 tablets (40 mg) by mouth daily for 5 days       ZYRTEC ALLERGY 10 MG Caps   Generic drug:  cetirizine HCl      Take by mouth daily

## 2017-06-17 NOTE — NURSING NOTE
"Chief Complaint   Patient presents with     Musculoskeletal Problem     neck and shoulder pain       Initial Resp 14  Ht 5' 8\" (1.727 m)  Wt 212 lb (96.2 kg)  BMI 32.23 kg/m2 Estimated body mass index is 32.23 kg/(m^2) as calculated from the following:    Height as of this encounter: 5' 8\" (1.727 m).    Weight as of this encounter: 212 lb (96.2 kg).  Medication Reconciliation: complete     Franklin Danielson MS, ATC      "

## 2017-06-17 NOTE — PATIENT INSTRUCTIONS
Thank you for allowing us to participate in your care today.  Please find below your visit diagnosis and the plan going forward.    1. Cervical radicular pain    2. Muscle spasm      Activity modification as discussed - continue   Physical therapy: [facility] - [phone]  Will see if walk-in spine is available for today  Prescription Medication as directed: Prednisone. Take in the AM and with food. No other anti-inflammatories.  Rx for Flexeril. Take at night. No alcohol or driving while taking.    Follow up in 1-2 weeks. Call direct clinic number [[phone]] at any time with questions or concerns.    Ladi Pugh DO CAQSM  [facility]  Website: [email or web address]  Twitter: [email or web address]

## 2017-06-20 ENCOUNTER — THERAPY VISIT (OUTPATIENT)
Dept: PHYSICAL THERAPY | Facility: CLINIC | Age: 52
End: 2017-06-20
Payer: COMMERCIAL

## 2017-06-20 DIAGNOSIS — M54.12 CERVICAL RADICULOPATHY: Primary | ICD-10-CM

## 2017-06-20 PROCEDURE — 97530 THERAPEUTIC ACTIVITIES: CPT | Mod: GP | Performed by: PHYSICAL THERAPIST

## 2017-06-20 PROCEDURE — 97110 THERAPEUTIC EXERCISES: CPT | Mod: GP | Performed by: PHYSICAL THERAPIST

## 2017-06-20 PROCEDURE — 97161 PT EVAL LOW COMPLEX 20 MIN: CPT | Mod: GP | Performed by: PHYSICAL THERAPIST

## 2017-06-20 NOTE — MR AVS SNAPSHOT
After Visit Summary   6/20/2017    Phillip Nassar    MRN: [id number]           Patient Information     Date Of Birth          1965        Visit Information        Provider Department      6/20/2017 3:30 PM Alycia Washburn, PT [facility]        Today's Diagnoses     Cervical radiculopathy    -  1       Follow-ups after your visit        Your next 10 appointments already scheduled     Jun 28, 2017  3:50 PM CDT   [facility] Spine with Alycia Wasbhurn PT   [facility] ([facility]  )    [address]  [address]  [place] [address]   [phone]            Jun 30, 2017  4:20 PM CDT   Return Visit with Ladi Pugh DO   [facility] ([facility] [place])    [address] [address]  [place] [address]   [phone]              Who to contact     If you have questions or need follow up information about today's clinic visit or your schedule please contact [facility] directly at [phone].  Normal or non-critical lab and imaging results will be communicated to you by Bionomicshart, letter or phone within 4 business days after the clinic has received the results. If you do not hear from us within 7 days, please contact the clinic through [email or web address] or phone. If you have a critical or abnormal lab result, we will notify you by phone as soon as possible.  Submit refill requests through [employer] or call your pharmacy and they will forward the refill request to us. Please allow 3 business days for your refill to be completed.          Additional Information About Your Visit        Bionomicshart Information     [employer] gives you secure access to your electronic health record. If you see a primary care provider, you can also send messages to your care team and make appointments. If you have questions, please call your primary care clinic.  If you do not have a primary care provider, please call [phone] and they will assist you.        Care  EveryWhere ID     This is your Care EveryWhere ID. This could be used by other organizations to access your [facility] medical records  [id number]         Blood Pressure from Last 3 Encounters:   06/09/17 128/88   05/22/17 128/78   02/28/17 (!) 127/92    Weight from Last 3 Encounters:   06/17/17 96.2 kg (212 lb)   06/09/17 96.2 kg (212 lb)   05/26/17 94.3 kg (208 lb)              We Performed the Following     HC PT EVAL, LOW COMPLEXITY     [facility] INITIAL EVAL REPORT     THERAPEUTIC ACTIVITIES     THERAPEUTIC EXERCISES        Primary Care Provider Office Phone # Fax #    Albert Chapman [phone]       [facility] [address] [place] [address]        Equal Access to Services     KARLA DUNBAR : Hadii aad ku hadasho Soomaali, waaxda luqadaha, qaybta kaalmada adeegyada, waxay idiin hayaan carson khalvin salazar . So [facility] [phone].    ATENCIÓN: Si habla español, tiene a segura disposición servicios gratuitos de asistencia lingüística. Llame al [phone].    We comply with applicable federal civil rights laws and [place] laws. We do not discriminate on the basis of race, color, national origin, age, disability sex, sexual orientation or gender identity.            Thank you!     [facility]  [facility]. Our goal is always to provide you with excellent care. Hearing back from our patients is one way we can continue to improve our services. Please take a few minutes to complete the written survey that you may receive in the mail after your visit with us. Thank you!             Your Updated Medication List - Protect others around you: Learn how to safely use, store and throw away your medicines at www.disposemymeds.org.          This list is accurate as of: 6/20/17 11:59 PM.  Always use your most recent med list.                   Brand Name Dispense Instructions for use Diagnosis    albuterol 108 (90 BASE) MCG/ACT Inhaler    PROAIR  HFA/PROVENTIL HFA/VENTOLIN HFA    3 Inhaler    Inhale 2 puffs into the lungs every 6 hours as needed for shortness of breath / dyspnea    Intermittent asthma       cyclobenzaprine 10 MG tablet    FLEXERIL    14 tablet    Take 1 tablet (10 mg) by mouth nightly as needed for muscle spasms    Cervical radicular pain, Muscle spasm       fluticasone 50 MCG/ACT spray    FLONASE    3 Package    Spray 1-2 sprays into both nostrils daily    Allergic rhinitis, cause unspecified       HYDROcodone-acetaminophen 5-325 MG per tablet    NORCO    10 tablet    Take 1 tablet by mouth every 4 hours as needed for pain maximum 6 tablet(s) per day    Encounter for sterilization       montelukast 10 MG tablet    SINGULAIR    90 tablet    Take 1 tablet (10 mg) by mouth At Bedtime    Intermittent asthma, uncomplicated       predniSONE 20 MG tablet    DELTASONE    10 tablet    Take 2 tablets (40 mg) by mouth daily for 5 days    Cervical radicular pain       ZYRTEC ALLERGY 10 MG Caps   Generic drug:  cetirizine HCl      Take by mouth daily

## 2017-06-26 PROBLEM — M54.12 CERVICAL RADICULOPATHY: Status: ACTIVE | Noted: 2017-06-26

## 2017-06-26 NOTE — PROGRESS NOTES
"[facility] Initial Evaluation -- Cervical    Evaluation Date: June 20, 2017  Phillip Nassar is a 51 year old male with a cervical condition.   Referral: Dr. Puhg  Employment/Workability (Lost work days): 0   Formal Exerciser (Y/N): walking, light weights  Leisure mechanical stresses:   Functional disability score (NDI/STarT Back):  See flow sheet  VAS score (0-10): 7/10  Patient goals/expectations:  \"just to learn some exercises, or manage the pain, or get the pain to go away\"    HISTORY:    Present symptoms:  L shoulder blade, upper arm, forearm, L hand.  Pain quality (sharp/shooting/stabbing/aching/burning/cramping):   Aching, sharp.  Paresthesia (yes/no):  Did have tingling but gone now after muscle relaxants    DFO < 22 days (Y/N): 14 days ago (June 2017).   Symptoms (improving/unchanging/worsening): improving.  Symptoms commenced as a result of: no apparent reason     Symptoms at onset (neck/arm/forearm/headache): L shoulder, left neck/shoulder blade  Constant symptoms (neck/arm/forearm/headache): L lower arm  Intermittent symptoms (neck/arm/forearm/headache): neck/shoulder    Symptoms are made worse with the following: Always Turning, Always Lying, time of day - No effect and Always When still   Symptoms are made better with the following: time of day - Always AM and Always On the move    Disturbed sleep (yes/no):     Sleeping postures (prone/sup/side R/L): back, side    Previous episodes (0/1-5/6-10/11+): 0 Year of first episode: na/    Previous history: no previous history  Previous treatments: L shoulder injection, steroids (slightly helpful)    Specific Questions: (as reported by the patient)  Dizziness/Tinnitus/Nausea/Swallowing (pos/neg): negative  Gait/Upper Limbs (normal/abnormal): abnormal  Medications (nil/NSAIDS/anlag/steroids/anticoag/other):  Muscle relaxants and Steroids  Medical allergies:  none  General health (excellent/good/fair/poor):  good  Pertinent medical history: "  None  Imaging (NA/Xray/MRI):  n/a  Recent or major surgery (yes/no): no  Night pain (yes/no): yes, sleeping on couch  Accidents (yes/no): no  Unexplained weight loss (yes/no): no  Barriers at home: none  Other red flags: none    EXAMINATION    Posture:   Sitting (good/fair/poor): vvv  Standing (good/fair/poor): fair     Protruded head (yes/no): yes    Wry Neck (right/left/nil):  nil  Relevant (yes/no):  no     Correction of posture(better/worse/no effect): worse  Other observations:  n/a    Neurological:    Motor Deficit:  C6=4+/5, C7=5-/5   Reflexes:  intact  Sensory Deficit:  intact   Dural signs:  +ULNTT L     Movement Loss:   Mynor Mod Min Nil Pain   Protrusion    x incr L forearm   Flexion    x    Retraction  x   P L elbow   Extension  x   P L hand tingling   Lateral flexion R   x     Lateral flexion L   x  decr L hand   Rotation R   x x    Rotation L   x x      Test Movements:   During: produces, abolishes, increases, decreases, no effect, centralizing, peripheralizing  After: better, worse, no better, no worse, no effect, centralized, peripheralized    Pretest symptoms sitting: L neck, L hand   Symptoms During Symptoms After ROM increased ROM decreased No Effect   PRO        Rep PRO        RET Increases    Worse         Rep RET Increases    Worse      x   RET EXT        Rep RET EXT        Pretest symptoms lying:  Not tolerated position   Symptoms During Symptoms After ROM increased ROM decreased No Effect   RET        Rep RET        RET EXT        Rep RET EXT        If required, pretest symptoms sitting:  L neck, L hand    Symptoms During Symptoms After ROM increased ROM decreased No Effect   LF-R        Rep LF-R        LF-L Decreases    No Better         Rep LF-L Decreases     better x     ROT-R        Rep ROT-R        ROT-L        Rep ROT-L        FLEX        Rep FLEX            Static Tests:   Protrusion:    Flexion:    Retraction:    Extension (sitting/prone/supine):      Other Tests:     Provisional  Classification:  Derangement - Asymmetrical, unilateral, symptoms below elbow, with relevant lateral component    Principle of Management:  Education:  Posture, frquency of HEP, centralization    Equipment provided:  none  Mechanical therapy (Y/N):  Y   Extension principle:     Lateral principle:  Rep L SB x 10-12/2 hrs  Flexion principle:     Other:      ASSESSMENT/PLAN:    Patient is a 51 year old male with cervical complaints.    Patient has the following significant findings with corresponding treatment plan.                Diagnosis 1:  Cervical radiculopathy  Pain -  manual therapy, self management, education, directional preference exercise and home program  Decreased ROM/flexibility - manual therapy and therapeutic exercise  Decreased strength - therapeutic exercise and therapeutic activities  Decreased function - therapeutic activities    Therapy Evaluation Codes:   1) History comprised of:   Personal factors that impact the plan of care:      None.    Comorbidity factors that impact the plan of care are:      None.     Medications impacting care: Muscle relaxant and Steroids.  2) Examination of Body Systems comprised of:   Body structures and functions that impact the plan of care:      Cervical spine.   Activity limitations that impact the plan of care are:      Driving, Lifting, Reading/Computer work, Working, Sleeping and Laying down.  3) Clinical presentation characteristics are:   Evolving/Changing.  4) Decision-Making    Low complexity using standardized patient assessment instrument and/or measureable assessment of functional outcome.  Cumulative Therapy Evaluation is: Low complexity.    Previous and current functional limitations:  (See Goal Flow Sheet for this information)    Short term and Long term goals: (See Goal Flow Sheet for this information)     Communication ability:  Patient appears to be able to clearly communicate and understand verbal and written communication and follow directions  correctly.  Treatment Explanation - The following has been discussed with the patient:   RX ordered/plan of care  Anticipated outcomes  Possible risks and side effects  This patient would benefit from PT intervention to resume normal activities.   Rehab potential is good.    Frequency:  1 X week, once daily  Duration:  for 6 weeks  Discharge Plan:  Achieve all LTG.  Independent in home treatment program.  Reach maximal therapeutic benefit.    Please refer to the daily flowsheet for treatment today, total treatment time and time spent performing 1:1 timed codes.

## 2017-06-28 ENCOUNTER — THERAPY VISIT (OUTPATIENT)
Dept: PHYSICAL THERAPY | Facility: CLINIC | Age: 52
End: 2017-06-28
Payer: COMMERCIAL

## 2017-06-28 DIAGNOSIS — M54.12 CERVICAL RADICULOPATHY: ICD-10-CM

## 2017-06-28 PROCEDURE — 97110 THERAPEUTIC EXERCISES: CPT | Mod: GP | Performed by: PHYSICAL THERAPIST

## 2017-06-28 PROCEDURE — 97530 THERAPEUTIC ACTIVITIES: CPT | Mod: GP | Performed by: PHYSICAL THERAPIST

## 2017-06-28 PROCEDURE — 97140 MANUAL THERAPY 1/> REGIONS: CPT | Mod: GP | Performed by: PHYSICAL THERAPIST

## 2017-06-30 ENCOUNTER — OFFICE VISIT (OUTPATIENT)
Dept: ORTHOPEDICS | Facility: CLINIC | Age: 52
End: 2017-06-30
Payer: COMMERCIAL

## 2017-06-30 VITALS
BODY MASS INDEX: 32.13 KG/M2 | SYSTOLIC BLOOD PRESSURE: 120 MMHG | DIASTOLIC BLOOD PRESSURE: 82 MMHG | WEIGHT: 212 LBS | HEIGHT: 68 IN

## 2017-06-30 DIAGNOSIS — M54.12 CERVICAL RADICULAR PAIN: ICD-10-CM

## 2017-06-30 DIAGNOSIS — M25.512 ACUTE PAIN OF LEFT SHOULDER: Primary | ICD-10-CM

## 2017-06-30 DIAGNOSIS — M62.838 MUSCLE SPASM: ICD-10-CM

## 2017-06-30 PROCEDURE — 99214 OFFICE O/P EST MOD 30 MIN: CPT | Performed by: FAMILY MEDICINE

## 2017-06-30 RX ORDER — CYCLOBENZAPRINE HCL 10 MG
10 TABLET ORAL
Qty: 20 TABLET | Refills: 0 | Status: SHIPPED | OUTPATIENT
Start: 2017-06-30 | End: 2020-12-29

## 2017-06-30 NOTE — PROGRESS NOTES
"ASSESSMENT & PLAN    ICD-10-CM    1. Acute pain of left shoulder M25.512    2. Cervical radicular pain M54.12 cyclobenzaprine (FLEXERIL) 10 MG tablet     MR Cervical Spine w/o Contrast   3. Muscle spasm M62.838 cyclobenzaprine (FLEXERIL) 10 MG tablet   Letter for work: Limit lifting to 10 lbs x 2 weeks  MRI cervical spine ordered. Schedule with [place] ([phone]).   Continue with Alycia/ physical therapy    Follow up 2 days after MRI to discuss results and next steps. Call direct clinic number [[phone]] at any time with questions or concerns.  Instructed to call the office if the condition evolves or worsens.    -----    SUBJECTIVE:  Phillip Nassar is a 51 year old male who is seen in follow-up for left shoulder pain which appears cervicogenic. They were last seen 6/17/2017.     Since their last visit reports worsening pain, mainly today. Increased pain, burning, cramping. They indicate that their pain level is 8/10. They have tried ibuprofen 8-10 per day, 200mg each. He has been to 2 visits of physical therapy which help his pain - almost resolves his shoulder pain while doing PT but then pain quickly returns.  He reports no pain in the hand, but some shooting pain down the arm. He reports no burning pain, just dull ache.    Patient's past medical, surgical, social, and family histories were reviewed today and no changes are noted.    REVIEW OF SYSTEMS:  Constitutional: NEGATIVE for fever, chills, change in weight  Skin: NEGATIVE for worrisome rashes, moles or lesions  GI/: NEGATIVE for bowel or bladder changes  Neuro: NEGATIVE for weakness, dizziness or paresthesias    OBJECTIVE:  /82  Ht 5' 8\" (1.727 m)  Wt 212 lb (96.2 kg)  BMI 32.23 kg/m2   General: healthy, alert and in no distress  HEENT: no scleral icterus or conjunctival erythema  Skin: no suspicious lesions or rash. No jaundice.  CV: regular rhythm by palpation  Resp: normal respiratory effort without conversational dyspnea   Psych: " normal mood and affect  Gait: normal steady gait with appropriate coordination and balance  Neuro: normal light touch sensory exam of the extremities.    MSK:  CERVICAL SPINE  Inspection:    Rounded shoulders, forward head  Palpation:    Tender about the upper trapezius (left), rhomboids (left) and medial border of scapula.  Range of Motion:     Flexion limited by pain    Extension very limited and acutely worsens his pain    Independent visualization of the below image:  None indicated at this time    Patient's conditions were thoroughly discussed during today's visit with greater than 50% of the visit spent counseling the patient with total time spent face-to-face with the patient being 15 minutes.    Ladi Pugh DO [facility]

## 2017-06-30 NOTE — PATIENT INSTRUCTIONS
Thank you for allowing us to participate in your care today.  Please find below your visit diagnosis and the plan going forward.    1. Acute pain of left shoulder    2. Cervical radicular pain    3. Muscle spasm      Letter for work: Limit lifting to 10 lbs x 2 weeks  MRI cervical spine ordered. Schedule with An[facility] ([phone]).   Continue with Alycia/ physical therapy    Follow up 2 days after MRI to discuss results and next steps. Call direct clinic number [[phone]] at any time with questions or concerns.    Ladi Pugh DO CAQSM  [facility]  Website: [email or web address]  Twitter: [email or web address]

## 2017-06-30 NOTE — NURSING NOTE
"Chief Complaint   Patient presents with     RECHECK       Initial /82  Ht 5' 8\" (1.727 m)  Wt 212 lb (96.2 kg)  BMI 32.23 kg/m2 Estimated body mass index is 32.23 kg/(m^2) as calculated from the following:    Height as of this encounter: 5' 8\" (1.727 m).    Weight as of this encounter: 212 lb (96.2 kg).  Medication Reconciliation: complete     Davi Junior ATC    "

## 2017-06-30 NOTE — LETTER
[place] [facility] SPORTS MEDICINE  [address]  [address]  [facility] [address]  [phone]     June 30, 2017    Phillip Nassar  1965  [address]  [place] [address]      To Whom It May Concern:    Phillip was seen in clinic today for    Acute pain of left shoulder  Cervical radicular pain  Muscle spasm.     Phillip may return to work on or about 6/30/17 with the following: Light duty-unable to lift more than 10 pounds. These restrictions will be valid from 6/30/17 to 7/14/17.    Phillip will schedule to follow up in clinic prior to the above mentioned end date for re-evaluation of    Acute pain of left shoulder  Cervical radicular pain  Muscle spasm and review of work restrictions.    Please feel free to contact myself or my Clinic Coordinator, Davi Junior, with any questions or concerns at the above number.        Sincerely,          Ladi Pugh DO, CAQSM  Davi Junior [facility], MAEd on behalf of Dr. Pugh

## 2017-06-30 NOTE — MR AVS SNAPSHOT
After Visit Summary   6/30/2017    Phillip Nassar    MRN: [id number]           Patient Information     Date Of Birth          1965        Visit Information        Provider Department      6/30/2017 4:20 PM Ladi Pugh DO [facility] SPORTS MEDICINE        Today's Diagnoses     Acute pain of left shoulder    -  1    Cervical radicular pain        Muscle spasm          Care Instructions    Thank you for allowing us to participate in your care today.  Please find below your visit diagnosis and the plan going forward.    1. Acute pain of left shoulder    2. Cervical radicular pain    3. Muscle spasm      Letter for work: Limit lifting to 10 lbs x 2 weeks  MRI cervical spine ordered. Schedule with [facility] ([phone]).   Continue with Alycia/ physical therapy    Follow up 2 days after MRI to discuss results and next steps. Call direct clinic number [[phone]] at any time with questions or concerns.    Ladi Pugh DO [facility]  Website: [email or web address]  Twitter: [email or web address]          Follow-ups after your visit        Your next 10 appointments already scheduled     Jul 13, 2017  7:40 AM CDT   [facility] Spine with Alycia Washburn, PT   [facility] ([facility] [place]  )    [address]  [address] [place] [address]   [phone]              Future tests that were ordered for you today     Open Future Orders        Priority Expected Expires Ordered    MR Cervical Spine w/o Contrast Routine  7/1/2018 6/30/2017            Who to contact     If you have questions or need follow up information about today's clinic visit or your schedule please contact [facility] SPORTS MEDICINE directly at [phone].  Normal or non-critical lab and imaging results will be communicated to you by MyChart, letter or phone within 4 business days after the clinic has received the results. If you do not hear from us within 7 days, please  "contact the clinic through [employer] or phone. If you have a critical or abnormal lab result, we will notify you by phone as soon as possible.  Submit refill requests through [employer] or call your pharmacy and they will forward the refill request to us. Please allow 3 business days for your refill to be completed.          Additional Information About Your Visit        codetagharLucky Oyster Information     [employer] gives you secure access to your electronic health record. If you see a primary care provider, you can also send messages to your care team and make appointments. If you have questions, please call your primary care clinic.  If you do not have a primary care provider, please call [phone] and they will assist you.        Care EveryWhere ID     This is your Care EveryWhere ID. This could be used by other organizations to access your [place] medical records  [id number]        Your Vitals Were     Height BMI (Body Mass Index)                5' 8\" (1.727 m) 32.23 kg/m2           Blood Pressure from Last 3 Encounters:   06/30/17 120/82   06/09/17 128/88   05/22/17 128/78    Weight from Last 3 Encounters:   06/30/17 212 lb (96.2 kg)   06/17/17 212 lb (96.2 kg)   06/09/17 212 lb (96.2 kg)                 Where to get your medicines      These medications were sent to [facility]/pharmacy #[id number] - [place], [place] - [address] AT [address], [place] [address]     Phone:  [phone]     cyclobenzaprine 10 MG tablet          Primary Care Provider Office Phone # Fax #    Albert Chapman [phone]       [address] [place] [address]        Equal Access to Services     [facility] AH: Hadii omid Ball, waaxda luqadaha, qaybta kaalmada leatha, isabella luz. So [facility] [phone].    ATENCIÓN: Si habla español, tiene a segura disposición servicios gratuitos de asistencia lingüística. Llame al " [phone].    We comply with applicable federal civil rights laws and [place] laws. We do not discriminate on the basis of race, color, national origin, age, disability sex, sexual orientation or gender identity.            Thank you!     Thank you for choosing [facility] SPORTS [facility]  for your care. Our goal is always to provide you with excellent care. Hearing back from our patients is one way we can continue to improve our services. Please take a few minutes to complete the written survey that you may receive in the mail after your visit with us. Thank you!             Your Updated Medication List - Protect others around you: Learn how to safely use, store and throw away your medicines at www.disposemymeds.org.          This list is accurate as of: 6/30/17  5:03 PM.  Always use your most recent med list.                   Brand Name Dispense Instructions for use Diagnosis    albuterol 108 (90 BASE) MCG/ACT Inhaler    PROAIR HFA/PROVENTIL HFA/VENTOLIN HFA    3 Inhaler    Inhale 2 puffs into the lungs every 6 hours as needed for shortness of breath / dyspnea    Intermittent asthma       cyclobenzaprine 10 MG tablet    FLEXERIL    20 tablet    Take 1 tablet (10 mg) by mouth nightly as needed for muscle spasms    Cervical radicular pain, Muscle spasm       fluticasone 50 MCG/ACT spray    FLONASE    3 Package    Spray 1-2 sprays into both nostrils daily    Allergic rhinitis, cause unspecified       HYDROcodone-acetaminophen 5-325 MG per tablet    NORCO    10 tablet    Take 1 tablet by mouth every 4 hours as needed for pain maximum 6 tablet(s) per day    Encounter for sterilization       montelukast 10 MG tablet    SINGULAIR    90 tablet    Take 1 tablet (10 mg) by mouth At Bedtime    Intermittent asthma, uncomplicated       ZYRTEC ALLERGY 10 MG Caps   Generic drug:  cetirizine HCl      Take by mouth daily

## 2017-07-03 ENCOUNTER — HOSPITAL ENCOUNTER (OUTPATIENT)
Dept: MRI IMAGING | Facility: CLINIC | Age: 52
Discharge: HOME | End: 2017-07-03
Attending: FAMILY MEDICINE | Admitting: FAMILY MEDICINE
Payer: COMMERCIAL

## 2017-07-03 DIAGNOSIS — M54.12 CERVICAL RADICULAR PAIN: ICD-10-CM

## 2017-07-03 PROCEDURE — 72141 MRI NECK SPINE W/O DYE: CPT

## 2017-07-05 ENCOUNTER — THERAPY VISIT (OUTPATIENT)
Dept: PHYSICAL THERAPY | Facility: CLINIC | Age: 52
End: 2017-07-05
Payer: COMMERCIAL

## 2017-07-05 DIAGNOSIS — M54.12 CERVICAL RADICULOPATHY: Primary | ICD-10-CM

## 2017-07-05 PROCEDURE — 97530 THERAPEUTIC ACTIVITIES: CPT | Mod: GP | Performed by: PHYSICAL THERAPIST

## 2017-07-05 PROCEDURE — 97110 THERAPEUTIC EXERCISES: CPT | Mod: GP | Performed by: PHYSICAL THERAPIST

## 2017-07-05 PROCEDURE — 97140 MANUAL THERAPY 1/> REGIONS: CPT | Mod: GP | Performed by: PHYSICAL THERAPIST

## 2017-07-06 ENCOUNTER — OFFICE VISIT (OUTPATIENT)
Dept: ORTHOPEDICS | Facility: CLINIC | Age: 52
End: 2017-07-06
Payer: COMMERCIAL

## 2017-07-06 VITALS — BODY MASS INDEX: 32.13 KG/M2 | HEIGHT: 68 IN | RESPIRATION RATE: 14 BRPM | WEIGHT: 212 LBS

## 2017-07-06 DIAGNOSIS — M54.12 CERVICAL RADICULAR PAIN: Primary | ICD-10-CM

## 2017-07-06 DIAGNOSIS — M50.20 CERVICAL HERNIATION: ICD-10-CM

## 2017-07-06 DIAGNOSIS — M48.02 FORAMINAL STENOSIS OF CERVICAL REGION: ICD-10-CM

## 2017-07-06 PROCEDURE — 99213 OFFICE O/P EST LOW 20 MIN: CPT | Performed by: FAMILY MEDICINE

## 2017-07-06 NOTE — MR AVS SNAPSHOT
After Visit Summary   7/6/2017    Phillip Nassar    MRN: [id number]           Patient Information     Date Of Birth          1965        Visit Information        Provider Department      7/6/2017 2:20 PM Ladi Pugh DO [facility] SPORTS MEDICINE        Today's Diagnoses     Cervical radicular pain    -  1    Cervical herniation        Foraminal stenosis of cervical region          Care Instructions    Thank you for allowing us to participate in your care today.  Please find below your visit diagnosis and the plan going forward.    1. Cervical radicular pain    2. Cervical herniation    3. Foraminal stenosis of cervical region      Referral made for steroid injection  Letter for work: continue work restriction through 7/31/17.  Continue with physical therapy    Follow up 2 weeks after injection or sooner if needed. Call direct clinic number [[phone]] at any time with questions or concerns.    Ladi Pugh DO [facility]  Website: [email or web address]  Twitter: [email or web address]            Follow-ups after your visit        Additional Services     PAIN MANAGEMENT CENTER ([place]) REFERRAL       [facility] Referral    Please be aware that coverage of these services is subject to the terms and limitations of your health insurance plan.  Call member services at your health plan with any benefit or coverage questions.      Please bring the following to your appointment:  Any x-rays, CTs or MRIs which have been performed.  Contact the facility where they were done to arrange for  prior to your scheduled appointment.  Any new CT, MRI or other procedures ordered by your specialist must be performed at a [facility] facility or coordinated by your clinic's referral office.    List of current medications   This referral request  Any documents/labs given to you for this referral      Reason for Consult:  Procedure or injection  only - patient will be contacted within 24 hrs to schedule: Injection to be determined by Pain Management Specialist.      Please answer the following questions:    What is your diagnosis for this patient's pain?  Left cervical radic, C6-7 leftward disc herniation    Do you have any specific questions for the pain specialist? No    Are there any red flags that may impact the assessment or management of this patient?    None    ANY DIAGNOSTIC TESTS THAT ARE NOT IN EPIC SHOULD BE SENT TO THE PAIN CENTER    Please note the pre op pain consult must be scheduled 2-3 weeks prior to the patient's surgery. Patient's trying to schedule within 2 weeks of surgery may not be accommodated.    Pre Op Pain Consults are only good for 30 days.    REGARDING OPIOID MEDICATIONS:  We will always address appropriateness of opioid pain medications, but we generally will not automatically take on a prescribing role. When we do take on prescribing of opioids for chronic pain, it is in collaboration with the referring physician for an intermediate period of time (months), with an expectation that the primary physician or provider will assume the prescribing role if medications are effective at stable doses with demonstrated compliance.  Therefore, please do not assume that your prescribing responsibilities end on the day of pain clinic consultation.  Is this agreeable to you? YES    For any questions, contact the [facility] at [phone]                  Your next 10 appointments already scheduled     Jul 13, 2017  7:40 AM CDT   [facility] Spine with Alycia Washburn PT   [facility] ([facility] [place]  )    [address] [address] [address]   [phone]            Jul 17, 2017  3:50 PM CDT   [facility] Spine with Cora Chi PT   [facility] ([facility])    [address] [place] [address] [phone]            Jul 25, 2017  4:10 PM CDT   [facility] Spine with  "Alycia Washburn, PT   [facility] ([facility]A [place]  )    [address]  [address]  [facility] [address]   [phone]            Aug 02, 2017  3:50 PM CDT   [facility] Spine with Alycia Washburn, PT   [facility] (STELLA [place]  )    [address]  [facility] [address]   [phone]              Who to contact     If you have questions or need follow up information about today's clinic visit or your schedule please contact [facility] SPORTS MEDICINE directly at [phone].  Normal or non-critical lab and imaging results will be communicated to you by Buzz Referralshart, letter or phone within 4 business days after the clinic has received the results. If you do not hear from us within 7 days, please contact the clinic through [employer] or phone. If you have a critical or abnormal lab result, we will notify you by phone as soon as possible.  Submit refill requests through [employer] or call your pharmacy and they will forward the refill request to us. Please allow 3 business days for your refill to be completed.          Additional Information About Your Visit        Buzz ReferralsharFunky Android Information     [employer] gives you secure access to your electronic health record. If you see a primary care provider, you can also send messages to your care team and make appointments. If you have questions, please call your primary care clinic.  If you do not have a primary care provider, please call [phone] and they will assist you.        Care EveryWhere ID     This is your Care EveryWhere ID. This could be used by other organizations to access your [facility] medical records  [id number]        Your Vitals Were     Respirations Height BMI (Body Mass Index)             14 5' 8\" (1.727 m) 32.23 kg/m2          Blood Pressure from Last 3 Encounters:   06/30/17 120/82   06/09/17 128/88   05/22/17 128/78    Weight from Last 3 Encounters:   07/06/17 212 lb (96.2 kg)   06/30/17 212 lb (96.2 kg)   06/17/17 212 lb (96.2 kg)    "           We Performed the Following     PAIN MANAGEMENT CENTER ([place]) REFERRAL        Primary Care Provider Office Phone # Fax #    Albert Chapman [phone]       [facility] [place] [facility] [address] [place] [address]        Equal Access to Services     [facility] MANJINDER : Hadii aad ku hadasho Soomaali, waaxda luqadaha, qaybta kaalmada adeegyada, waxay idiin hayaan adeeg khalvin lamaheshbailey . So [facility] [phone].    ATENCIÓN: Si habla español, tiene a segura disposición servicios gratuitos de asistencia lingüística. Llame al [phone].    We comply with applicable federal civil rights laws and [place] laws. We do not discriminate on the basis of race, color, national origin, age, disability sex, sexual orientation or gender identity.            Thank you!     Thank you for choosing [facility] SPORTS MEDICINE  for your care. Our goal is always to provide you with excellent care. Hearing back from our patients is one way we can continue to improve our services. Please take a few minutes to complete the written survey that you may receive in the mail after your visit with us. Thank you!             Your Updated Medication List - Protect others around you: Learn how to safely use, store and throw away your medicines at www.disposemymeds.org.          This list is accurate as of: 7/6/17  2:56 PM.  Always use your most recent med list.                   Brand Name Dispense Instructions for use Diagnosis    albuterol 108 (90 BASE) MCG/ACT Inhaler    PROAIR HFA/PROVENTIL HFA/VENTOLIN HFA    3 Inhaler    Inhale 2 puffs into the lungs every 6 hours as needed for shortness of breath / dyspnea    Intermittent asthma       cyclobenzaprine 10 MG tablet    FLEXERIL    20 tablet    Take 1 tablet (10 mg) by mouth nightly as needed for muscle spasms    Cervical radicular pain, Muscle spasm       fluticasone 50 MCG/ACT spray    FLONASE    3 Package    Spray 1-2 sprays into both nostrils daily     Allergic rhinitis, cause unspecified       HYDROcodone-acetaminophen 5-325 MG per tablet    NORCO    10 tablet    Take 1 tablet by mouth every 4 hours as needed for pain maximum 6 tablet(s) per day    Encounter for sterilization       montelukast 10 MG tablet    SINGULAIR    90 tablet    Take 1 tablet (10 mg) by mouth At Bedtime    Intermittent asthma, uncomplicated       ZYRTEC ALLERGY 10 MG Caps   Generic drug:  cetirizine HCl      Take by mouth daily

## 2017-07-06 NOTE — LETTER
[place] [facility]ORTS [facility] ORTHOPEDIC CARE [facility] SPORTS MEDICINE  [address]  [address]  [facility] [address]  [phone]     June 30, 2017    Phillip Nassar  1965  [address]  [place] [address]    To Whom It May Concern:    Phillip was seen in clinic today for continued neck and left shoulder as a result of a herniated disc.  I have ordered a epidural steroid injection and recommend continued physical therapy and work restrictions.    Phillip may continue to work with the following: Light duty-unable to lift more than 10 pounds. These restrictions will be valid from today through 7/31/17.     Phillip will schedule to follow up in clinic prior to the above mentioned end date for re-evaluation and review of work restrictions.    Please feel free to contact myself or my Clinic Coordinator, Davi Junior, with any questions or concerns at the above number.    Sincerely,      Ladi Pugh DO, CAQSM  Davi Junior [facility], MAEd on behalf of Dr. Pugh

## 2017-07-06 NOTE — PROGRESS NOTES
"ASSESSMENT & PLAN    ICD-10-CM    1. Cervical radicular pain M54.12    Referral made for steroid injection  Letter for work: continue work restriction through 7/31/17.  Continue with physical therapy    Follow up 2 weeks after injection or sooner if needed. Call direct clinic number [[phone]] at any time with questions or concerns.    -----    SUBJECTIVE:  Phillip Nassar is a 51 year old male who is seen in follow-up for cervical radicular pain. They were last seen 6/30/2017.     They indicate that their pain level is 6/10. Here to review results of MRI.    Patient's past medical, surgical, social, and family histories were reviewed today and no changes are noted.    REVIEW OF SYSTEMS:  Constitutional: NEGATIVE for fever, chills, change in weight  Skin: NEGATIVE for worrisome rashes, moles or lesions  GI/: NEGATIVE for bowel or bladder changes  Neuro: NEGATIVE for weakness, dizziness or paresthesias    OBJECTIVE:  Resp 14  Ht 5' 8\" (1.727 m)  Wt 212 lb (96.2 kg)  BMI 32.23 kg/m2   General: healthy, alert and in no distress  HEENT: no scleral icterus or conjunctival erythema  Skin: no suspicious lesions or rash. No jaundice.  CV: regular rhythm by palpation  Resp: normal respiratory effort without conversational dyspnea   Psych: normal mood and affect  Gait: normal steady gait with appropriate coordination and balance  MSK:  Deferred    Independent visualization of the below image:    MR CERVICAL SPINE W/O CONTRAST 7/3/2017 9:30 AM     History: left cervical radic, Radiculopathy, cervical region     Comparison: Cervical spine radiograph from 6/17/2017     Technique: Sagittal T1-weighted, sagittal T2-weighted, sagittal STIR,  sagittal diffusion weighted, axial T2-weighted, and axial T2* gradient  echo images of the cervical spine were obtained without intravenous  contrast.     Findings:  The cervical vertebrae are normally aligned.  Straightening of the  normal cervical lordosis. There is no disc height " narrowing at any  level. However multilevel early disc desiccation is noted. There is  normal signal within and normal contour of the cervical spinal cord.   The findings on a level by level basis are as follows:     C2-3: Tiny central disc protrusion without significant spinal canal or  neural foraminal stenosis.     C3-4:  Disc bulge and bilateral uncinate spurring resulting in mild  left and moderate right foraminal narrowing. No spinal canal stenosis.     C4-5:  Disc bulge and left subarticular disc protrusion resulting in  moderate left foraminal narrowing. No significant spinal canal  stenosis.     C5-6:  Disc bulge with moderate right greater than left neural  foraminal narrowing. No spinal canal stenosis.     C6-7:  Left foraminal disc protrusion resulting in severe left neural  foraminal stenosis. Right neural foramen is only mildly narrowed. No  spinal canal stenosis.     C7-T1:  No spinal canal or neural foraminal stenosis.      No abnormality of the paraspinous soft tissues.         Impression: Multilevel degenerative disc disease of the cervical spine  as described in detail above. Most significant findings at C6-7 where  there is left foraminal disc protrusion resulting in severe left  foraminal stenosis and possible impingement of the left exiting C7  nerve root.      I have personally reviewed the examination and initial interpretation  and I agree with the findings.     PRUDENCIO MELENDREZ MD    Patient's conditions were1 thoroughly discussed during today's visit with greater than 50% of the visit spent counseling the patient with total time spent face-to-face with the patient being 15 minutes.    Ladi Pugh, DO [facility]

## 2017-07-06 NOTE — NURSING NOTE
"Chief Complaint   Patient presents with     RECHECK       Initial Resp 14  Ht 5' 8\" (1.727 m)  Wt 212 lb (96.2 kg)  BMI 32.23 kg/m2 Estimated body mass index is 32.23 kg/(m^2) as calculated from the following:    Height as of this encounter: 5' 8\" (1.727 m).    Weight as of this encounter: 212 lb (96.2 kg).  Medication Reconciliation: complete     Davi Junior, ATC    "

## 2017-07-06 NOTE — PATIENT INSTRUCTIONS
Thank you for allowing us to participate in your care today.  Please find below your visit diagnosis and the plan going forward.    1. Cervical radicular pain    2. Cervical herniation    3. Foraminal stenosis of cervical region      Referral made for steroid injection  Letter for work: continue work restriction through 7/31/17.  Continue with physical therapy    Follow up 2 weeks after injection or sooner if needed. Call direct clinic number [[phone]] at any time with questions or concerns.    Ladi Pugh DO CAQSM  [facility]  Website: [email or web address]  Twitter: [email or web address]

## 2017-07-07 ENCOUNTER — TELEPHONE (OUTPATIENT)
Dept: PALLIATIVE MEDICINE | Facility: CLINIC | Age: 52
End: 2017-07-07

## 2017-07-07 NOTE — TELEPHONE ENCOUNTER
Left voicemail for patient to schedule new evaluation.       Amanda BUCKLEY    [facility]

## 2017-07-07 NOTE — TELEPHONE ENCOUNTER
Pre-screening Questions for Radiology Injections:    Injection to be done at which interventional clinic site? [facility]    Procedure ordered by [place]    Procedure ordered? Cervical Epidural Steroid Injection    What insurance would patient like us to bill for this procedure? PO      Worker's comp-Any injection DO NOT SCHEDULE and route to Gillian Adamson.      [employer] insurance - For SI joint injections, DO NOT SCHEDULE and route Gillian Adamson.      [employer]- MBB's must be scheduled at LEAST two weeks apart      [employer] - Any injection besides hip/shoulder/knee joint DO NOT SCHEDULE and route to Gillian Adamson. She will obtain PA and call pt back to schedule procedure or notify pt of denial.     HP [employer]-PA REQUIRED FOR NON-VERONIQUE OR Joint injections    Any chance of pregnancy? Not Applicable   If YES, do NOT schedule and route to RN pool    Is an  needed? No     Patient has a drive home? (mandatory) YES:     Is patient taking any blood thinners (plavix, coumadin, jantoven, warfarin, heparin, pradaxa or dabigatran )? No   If hold needed, do NOT schedule, route to RN pool     Is patient taking any aspirin products? No     If more than 325mg/day do NOT schedule; route to RN pool     For CERVICAL procedures, hold all aspirin products for 6 days.      Does the patient have a bleeding or clotting disorder? No     If yes, okay to schedule AND route to RN nurse pool    **For any patients with platelet count <100, must be forwarded to provider**    Is patient diabetic?  No  If YES, have them bring their glucometer.    Does patient have an active infection or treated for one within the past week? No     Is patient currently taking any antibiotics?  No     For patients on chronic, preventative, or prophylactic antibiotics, procedures may be scheduled.     For patients on antibiotics for active or recent infection:    Adam Douglass [place], [place]-antibiotic course must have been  completed for 4 days    Amanda Hernandez-antibiotic course must have been completed for 7 days    Is patient currently taking any steroid medications? (i.e. Prednisone, Medrol)  No     For patients on steroid medications:    Adam Douglass [place], [place]-steroid course must have been completed for 4 days    Amanda Hernandez-steroid course must have been completed for 7 days    Reviewed with patient:  If you are started on any steroids or antibiotics between now and your appointment, you must contact us because it may affect our ability to perform your procedure.  Yes    Is patient actively being treated for cancer or immunocompromised, including the spleen having been removed? No    If YES, do NOT schedule and route to RN pool     **For Dr. Ontiveros patients without spleens should have the chart sent to her**    Are you able to get on and off an exam table with minimal or no assistance? Yes  If NO, do NOT schedule and route to RN pool    Are you able to roll over and lay on your stomach with minimal or no assistance? Yes  If NO, do NOT schedule and route to RN pool     Any allergies to contrast dye, iodine, shellfish, or numbing and steroid medications? No  If YES, route to RN pool AND add allergy information to appointment notes    Allergies: No known drug allergies        Has the patient had a flu shot or any other vaccinations within 7 days before or after the procedure.  No       Does patient have an MRI/CT?  YES:   (SI joint, hip injections, lumbar sympathetic blocks, and stellate ganglion blocks do not require an MRI)    Was the MRI done w/in the last 3 years?  Yes    Was MRI done at [place]? Yes      If not, where was it done? N/A       If MRI was not done at [facility], [facility] or [facility] do NOT schedule and route to nursing.  If pt has an imaging disc, the injection may be scheduled but pt has to bring disc to appt. If they show up w/out disc the injection cannot be done    Reminders  (please tell patient if applicable):       Instructed pt to arrive 30 minutes early for IV start if this is for a cervical procedure, ALL sympathetic (stellate ganglion, hypogastric, or lumbar sympathetic block) and all sedation procedures (RFA, spinal cord stimulation trials).  Not Applicable    -IVs are not routinely placed for [place] and [place] cervical case       If NPO for sedation, informed patient that it is okay to take medications with sips of water (except if they are to hold blood thinners).  Not Applicable   *DO take blood pressure medication if it is prescribed*      If this is for a cervical VERONIQUE, informed patient that aspirin needs to be held for 6 days.   Not Applicable      For all patients not having spinal cord stimulator (SCS) trials or radiofrequency ablations (RFAs), informed patient:  IV sedation is not provided for this procedure.  If you feel that an oral anti-anxiety medication is needed, you can discuss this further with your referring provider or primary care provider.  The Pain Clinic provider will discuss specifics of what the procedure includes at your appointment.  Most procedures last 10-20 minutes.  We use numbing medications to help with any discomfort during the procedure.  Not Applicable      Do not schedule procedures requiring IV placement in the first appointment of the day or first appointment after lunch.       For patients 85 or older we recommend having an adult stay w/ them for the remainder of the day.       Does the patient have any questions?    [facility]

## 2017-07-11 NOTE — PROGRESS NOTES
[facility] - Procedure Note    Date of Visit: 7/12/2017    Procedure performed: C7-T1 interlaminar epidural steroid injection with fluoroscopic guidance  Diagnosis: Cervical spondylosis; Cervical radiculitis/radiculopathy  : Krissy Lackey MD  Anesthesia: none    Indications: Phillip Nassar is a 51 year old male who is seen at the request of Dr. Pugh for cervical epidural steroid injection. The patient describes Left sided neck and shoulder pain which radiates down his left arm to the level of the wrist.  His pain began about 4 weeks ago without any trauma.  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 The patient has been exhibiting symptoms consistent with cervical intraspinal inflammation and radiculopathy. Symptoms have been persistent, disabling, and intermittently severe. The patient reports minimal improvement with conservative treatment, including PT and medications.    Cervical MRI was done on 7/03/2017 which showed   Findings:  The cervical vertebrae are normally aligned.  Straightening of the  normal cervical lordosis. There is no disc height narrowing at any  level. However multilevel early disc desiccation is noted. There is  normal signal within and normal contour of the cervical spinal cord.   The findings on a level by level basis are as follows:     C2-3: Tiny central disc protrusion without significant spinal canal or  neural foraminal stenosis.     C3-4:  Disc bulge and bilateral uncinate spurring resulting in mild  left and moderate right foraminal narrowing. No spinal canal stenosis.     C4-5:  Disc bulge and left subarticular disc protrusion resulting in  moderate left foraminal narrowing. No significant spinal canal  stenosis.     C5-6:  Disc bulge with moderate right greater than left neural  foraminal narrowing. No spinal canal stenosis.     C6-7:  Left foraminal disc protrusion resulting in severe left  neural  foraminal stenosis. Right neural foramen is only mildly narrowed. No  spinal canal stenosis.     C7-T1:  No spinal canal or neural foraminal stenosis.      No abnormality of the paraspinous soft tissues.       Impression: Multilevel degenerative disc disease of the cervical spine  as described in detail above. Most significant findings at C6-7 where  there is left foraminal disc protrusion resulting in severe left  foraminal stenosis and possible impingement of the left exiting C7  nerve root.     Allergies:      Allergies   Allergen Reactions     No Known Drug Allergies         Vitals:  /90  Pulse 80  SpO2 96%    Review of Systems: The patient denies recent fever, chills, illness, use of antibiotics or anticoagulants. All other 10-point review of systems negative.     Procedure: The procedure and risks were explained, and informed written consent was obtained from the patient. Risks include but are not limited to: infection, bleeding, increased pain, and damage to soft tissue, nerve, muscle, and vasculature structures. After getting informed consent, patient was brought into the procedure suite and was placed in a prone position on the procedure table. A Pause for the Cause was performed. Patient was prepped and draped in sterile fashion.     The C7-T1 interspace was identified with use of fluoroscopy in AP view. A 25-gauge, 1.5 inch needle was used to anesthetize the skin and subcutaneous tissue entry site with a total of 2 ml of 1% lidocaine. Under fluoroscopic visualization, a 22-gauge, 3.5 inch Tuohy epidural needle was slowly advanced towards the epidural space a few millimeters left of midline. The latter part of the needle advancement was guided with fluoroscopy in the lateral view. The epidural space was identified using loss of resistance technique. After negative aspiration for heme and cerebrospinal fluid, a total of 1 mL of non-ionic contrast was injected to confirm needle placement. 9  mL of contrast was wasted. Epidurogram confirmed spread within the posterior epidural space. 2 ml of 10mg/ml of dexamethasone and 1 ml of preservative free 1% lidocaine was injected. The needle was removed.  Images were saved to PACS.    The patient tolerated the procedure well, and there was no evidence of procedural complications. No new sensory or motor deficits were noted following the procedure. The patient was stable and able to ambulate on discharge home. Post-procedure instructions were provided.     Pre-procedure pain score: 6/10 in the neck, 5/10 in the arm  Post-procedure pain score: 3/10 in the neck, 3/10 in the arm    Assessment/Plan: Phillip Nassar is a 51 year old male s/p cervical interlaminar epidural steroid injection today for cervical spondylosis and radiculitis/radiculopathy.     1. Following today's procedure, the patient was advised to contact the [facility] for any of the following:   Fever, chills, or night sweats   New onset of pain, numbness, or weakness   Any questions/concerns regarding the procedure  If unable to contact the Pain Center, the patient was instructed to go to a local Emergency Room for any complications.   2. The patient will receive a follow-up call in 1 week.   3. Follow-up with Ladi Pugh DO in 2 weeks for post-procedure evaluation.    Krissy LackeyMD Pain Management

## 2017-07-12 ENCOUNTER — RADIOLOGY INJECTION OFFICE VISIT (OUTPATIENT)
Dept: PALLIATIVE MEDICINE | Facility: CLINIC | Age: 52
End: 2017-07-12
Payer: COMMERCIAL

## 2017-07-12 ENCOUNTER — RADIANT APPOINTMENT (OUTPATIENT)
Dept: GENERAL RADIOLOGY | Facility: CLINIC | Age: 52
End: 2017-07-12
Attending: ANESTHESIOLOGY

## 2017-07-12 VITALS — DIASTOLIC BLOOD PRESSURE: 91 MMHG | SYSTOLIC BLOOD PRESSURE: 141 MMHG | OXYGEN SATURATION: 97 % | HEART RATE: 75 BPM

## 2017-07-12 DIAGNOSIS — M54.12 CERVICAL RADICULOPATHY: Primary | ICD-10-CM

## 2017-07-12 DIAGNOSIS — M54.12 CERVICAL RADICULAR PAIN: ICD-10-CM

## 2017-07-12 DIAGNOSIS — M48.02 FORAMINAL STENOSIS OF CERVICAL REGION: ICD-10-CM

## 2017-07-12 DIAGNOSIS — M50.20 CERVICAL HERNIATION: ICD-10-CM

## 2017-07-12 PROCEDURE — 62321 NJX INTERLAMINAR CRV/THRC: CPT | Performed by: ANESTHESIOLOGY

## 2017-07-12 ASSESSMENT — PAIN SCALES - GENERAL: PAINLEVEL: SEVERE PAIN (7)

## 2017-07-12 NOTE — MR AVS SNAPSHOT
After Visit Summary   7/12/2017    Phillip Nassar    MRN: [id number]           Patient Information     Date Of Birth          1965        Visit Information        Provider Department      7/12/2017 12:45 PM Krissy Lackey MD [facility]        Care Instructions    [facility] Procedure Discharge Instructions    Today you saw:   Dr. Krissy Lackey    Your procedure:  Cervical epidural steroid injection       Medications used:  Lidocaine (anesthetic)  Dexamethasone (steroid)    Omnipaque (contrast)                Be cautious when walking as unsteadiness in the legs may occur up to 6-8 hours after the procedure     Do not drive for 6 hours. The effect of the local anesthetic could slow your reflexes.     Avoid strenuous activity for the first 24 hours. You may resume your regular activities after that.     You may shower, however avoid swimming, tub baths or hot tubs for 24 hours following your procedure    You may have a mild to moderate increase in pain for several days following the injection.      You may use ice packs for 10-15 minutes, 3 to 4 times a day at the injection site for comfort    Do not use heat to painful areas for 6 to 8 hours. This will give the local anesthetic time to wear off and prevent you from accidentally burning your skin.    You may use anti-inflammatory medications (such as Ibuprofen/Advil or Aleve) or Tylenol for pain control if necessary    It may take up to 14 days for the steroid medication to start working although you may feel the effect as early as a few days after the procedure.       If you experience any of the following, call the pain center line during work hours at [phone] or on-call physician after hours at [phone]:  -Fever over 100 degree F  -Swelling, bleeding, redness, drainage, warmth at the injection site  -Progressive weakness or numbness in your arms  -Unusual headache that is not relieved by Tylenol or your regular  headache medication  -Unusual new onset of pain that is not improving    Phone #s:  Nurse triage line for general questions: [phone]                  Follow-ups after your visit        Your next 10 appointments already scheduled     Jul 12, 2017 12:45 PM CDT   Radiology Injections with Krissy Lackey MD   [facility] ([facility])    [address]  [address]  [facility] [address]   [phone]            Jul 12, 2017 12:45 PM CDT   XR CERVICAL/THORACIC EPIDURAL INJECTION with [email or web address]   [facility] ([facility])    [address]  [address]  [facility] [address]   [phone]           For nerve root injection, please send or bring copies of any MRIs or other scans you have had.  Bring a list of your current medicines to your exam. (Include vitamins, minerals and over-the-counter medicines.) Leave your valuables at home.  Plan to have someone drive you home afterward.  Stop taking the following medicines (but talk to your doctor first):   If you take blood thinners, you may need to stop taking them a few days before treatment. Talk to your doctor before stopping these medicines.Stop taking Coumadin (warfarin) 3 days before treatment. Restart the day after treatment.   If you take Plavix, Ticlid, Pletal or Persantine, please ask your doctor if you should stop these medicines. You may need extra tests on the morning of your scan. You may take your other medicines as normal.  Stop all food and drink (including water) 3 hours before your test or treatment.  Please tell the doctor:   If you are allergic to X-ray dye (contrast fluid).   If you may be pregnant.  Injections take about 30 to 45 minutes. Most people spend up to 2 hours in the clinic or hospital.  Please call the Imaging Department at your exam site with any questions            Jul 13, 2017  7:40 AM CDT   [facility] with Alycia Washburn PT   [facility] [place]  [facility]    [address]  [address]  [facility] [address]   [phone]            Jul 17, 2017  3:50 PM CDT   [facility] Spine with Cora Chi PT   [facility] ([facility] [place])    [address] [place] [address]   [phone]            Jul 25, 2017  4:10 PM CDT   [facility] with Alycia Washburn, PT   [facility] [place]  [facility]    [address]  [address]  [place] [address]   [phone]            Aug 02, 2017  3:50 PM CDT   [facility] with Alycia Washburn, PT   [facility] [place]  [facility]    [address]  [address]  [facility] [address]   [phone]              Who to contact     If you have questions or need follow up information about today's clinic visit or your schedule please contact [facility] directly at [phone].  Normal or non-critical lab and imaging results will be communicated to you by Lanxhart, letter or phone within 4 business days after the clinic has received the results. If you do not hear from us within 7 days, please contact the clinic through [employer] (formerly [employer]) or phone. If you have a critical or abnormal lab result, we will notify you by phone as soon as possible.  Submit refill requests through [employer] ([employer]) or call your pharmacy and they will forward the refill request to us. Please allow 3 business days for your refill to be completed.          Additional Information About Your Visit        [employer] ([employer]) Information     [employer] ([employer]) gives you secure access to your electronic health record. If you see a primary care provider, you can also send messages to your care team and make appointments. If you have questions, please call your primary care clinic.  If you do not have a primary care provider, please call [phone] and they will assist you.        Care EveryWhere ID     This is your Care EveryWhere ID. This could be used by other organizations to access your [facility]  records  [id number]         Blood Pressure from Last 3 Encounters:   06/30/17 120/82   06/09/17 128/88   05/22/17 128/78    Weight from Last 3 Encounters:   07/06/17 96.2 kg (212 lb)   06/30/17 96.2 kg (212 lb)   06/17/17 96.2 kg (212 lb)              Today, you had the following     No orders found for display       Primary Care Provider Office Phone # Fax #    Albert Chapman [phone]       [facility] [address] [place] [address]        Equal Access to Services     [facility]: Hadii aad ku hadasho Soomaali, waaxda luqadaha, qaybta kaalmada adeegyada, isabella salazar . So [facility] [phone].    ATENCIÓN: Si habla español, tiene a segura disposición servicios gratuitos de asistencia lingüística. [facility] [phone].    We comply with applicable federal civil rights laws and [place] laws. We do not discriminate on the basis of race, color, national origin, age, disability sex, sexual orientation or gender identity.            Thank you!     Thank you for choosing [facility]  for your care. Our goal is always to provide you with excellent care. Hearing back from our patients is one way we can continue to improve our services. Please take a few minutes to complete the written survey that you may receive in the mail after your visit with us. Thank you!             Your Updated Medication List - Protect others around you: Learn how to safely use, store and throw away your medicines at www.disposemymeds.org.          This list is accurate as of: 7/12/17 12:42 PM.  Always use your most recent med list.                   Brand Name Dispense Instructions for use Diagnosis    albuterol 108 (90 BASE) MCG/ACT Inhaler    PROAIR HFA/PROVENTIL HFA/VENTOLIN HFA    3 Inhaler    Inhale 2 puffs into the lungs every 6 hours as needed for shortness of breath / dyspnea    Intermittent asthma       cyclobenzaprine 10 MG tablet    FLEXERIL    20  tablet    Take 1 tablet (10 mg) by mouth nightly as needed for muscle spasms    Cervical radicular pain, Muscle spasm       fluticasone 50 MCG/ACT spray    FLONASE    3 Package    Spray 1-2 sprays into both nostrils daily    Allergic rhinitis, cause unspecified       HYDROcodone-acetaminophen 5-325 MG per tablet    NORCO    10 tablet    Take 1 tablet by mouth every 4 hours as needed for pain maximum 6 tablet(s) per day    Encounter for sterilization       montelukast 10 MG tablet    SINGULAIR    90 tablet    Take 1 tablet (10 mg) by mouth At Bedtime    Intermittent asthma, uncomplicated       ZYRTEC ALLERGY 10 MG Caps   Generic drug:  cetirizine HCl      Take by mouth daily

## 2017-07-13 ENCOUNTER — THERAPY VISIT (OUTPATIENT)
Dept: PHYSICAL THERAPY | Facility: CLINIC | Age: 52
End: 2017-07-13
Payer: COMMERCIAL

## 2017-07-13 DIAGNOSIS — M54.12 CERVICAL RADICULOPATHY: ICD-10-CM

## 2017-07-13 PROCEDURE — 97110 THERAPEUTIC EXERCISES: CPT | Mod: GP | Performed by: PHYSICAL THERAPIST

## 2017-07-13 PROCEDURE — 97530 THERAPEUTIC ACTIVITIES: CPT | Mod: GP | Performed by: PHYSICAL THERAPIST

## 2017-07-13 PROCEDURE — 97140 MANUAL THERAPY 1/> REGIONS: CPT | Mod: GP | Performed by: PHYSICAL THERAPIST

## 2017-07-17 ENCOUNTER — THERAPY VISIT (OUTPATIENT)
Dept: PHYSICAL THERAPY | Facility: CLINIC | Age: 52
End: 2017-07-17
Payer: COMMERCIAL

## 2017-07-17 DIAGNOSIS — M54.12 CERVICAL RADICULOPATHY: ICD-10-CM

## 2017-07-17 PROCEDURE — 97140 MANUAL THERAPY 1/> REGIONS: CPT | Mod: GP | Performed by: PHYSICAL THERAPIST

## 2017-07-17 PROCEDURE — 97110 THERAPEUTIC EXERCISES: CPT | Mod: GP | Performed by: PHYSICAL THERAPIST

## 2017-08-02 ENCOUNTER — THERAPY VISIT (OUTPATIENT)
Dept: PHYSICAL THERAPY | Facility: CLINIC | Age: 52
End: 2017-08-02
Payer: COMMERCIAL

## 2017-08-02 DIAGNOSIS — M54.12 CERVICAL RADICULOPATHY: ICD-10-CM

## 2017-08-02 PROCEDURE — 97012 MECHANICAL TRACTION THERAPY: CPT | Mod: GP | Performed by: PHYSICAL THERAPIST

## 2017-08-02 PROCEDURE — 97110 THERAPEUTIC EXERCISES: CPT | Mod: GP | Performed by: PHYSICAL THERAPIST

## 2017-08-04 ENCOUNTER — OFFICE VISIT (OUTPATIENT)
Dept: ORTHOPEDICS | Facility: CLINIC | Age: 52
End: 2017-08-04
Payer: COMMERCIAL

## 2017-08-04 VITALS
HEIGHT: 68 IN | BODY MASS INDEX: 32.13 KG/M2 | DIASTOLIC BLOOD PRESSURE: 72 MMHG | WEIGHT: 212 LBS | SYSTOLIC BLOOD PRESSURE: 124 MMHG

## 2017-08-04 DIAGNOSIS — M50.20 CERVICAL HERNIATED DISC: ICD-10-CM

## 2017-08-04 DIAGNOSIS — M54.12 CERVICAL RADICULOPATHY: Primary | ICD-10-CM

## 2017-08-04 PROCEDURE — 99214 OFFICE O/P EST MOD 30 MIN: CPT | Performed by: FAMILY MEDICINE

## 2017-08-04 RX ORDER — GABAPENTIN 300 MG/1
CAPSULE ORAL
Qty: 90 CAPSULE | Refills: 1 | Status: SHIPPED | OUTPATIENT
Start: 2017-08-04 | End: 2020-12-29

## 2017-08-04 NOTE — MR AVS SNAPSHOT
After Visit Summary   8/4/2017    Phillip Nassar    MRN: [id number]           Patient Information     Date Of Birth          1965        Visit Information        Provider Department      8/4/2017 2:40 PM Ladi Pugh DO [facility]        Today's Diagnoses     Cervical radiculopathy    -  1    Cervical herniated disc          Care Instructions    Thank you for allowing us to participate in your care today.  Please find below your visit diagnosis and the plan going forward.    1. Cervical radiculopathy    2. Cervical herniated disc      Reviewed progress - continued pain which is slowly returning. Currently one month after your VERONIQUE.  Recommend surgical referral and placed for Dr. Guy  Will discuss repeat injection with pain management  Letter for work: continue restrictions though 8/25/17.  Rx for Gabapentin. Discussed side effects and slow taper.    Follow up as needed. Call direct clinic number [[phone]] at any time with questions or concerns.    Ladi Pugh DO [facility]  Website: [email or web address]  Twitter: [email or web address]            Follow-ups after your visit        Additional Services     ORTHO  REFERRAL       [facility] is referring you to the Orthopedic  Services at [facility] and [facility].       The  Representative will assist you in the coordination of your Orthopedic and Musculoskeletal Care as prescribed by your physician.    The  Representative will call you within 24 hours to help schedule your appointment, or you may contact the  Representative at:    [place] [facility] ~ [phone]  [facility] ~ [phone]  [facility] ~ [phone]    Type of Referral : Spine: Lumbar Spine Surgeon  - Dr. Guy - lumbar herniation w/ S1 radic. S/p VERONIQUE x 1 with resumption of pain.       Timeframe requested:  Within 2 weeks     Coverage of these services is subject to the terms and limitations of your health insurance plan.  Please call member services at your health plan with any benefit or coverage questions.      If X-rays, CT or MRI's have been performed, please contact the facility where they were done to arrange for , prior to your scheduled appointment.  Please bring this referral request to your appointment and present it to your specialist.                  Your next 10 appointments already scheduled     Aug 09, 2017  3:50 PM CDT   [facility] with Alycia Washburn PT   [facility] ([facility]  )    [address]  [address]  [facility] [id number] [phone]            Aug 16, 2017  3:50 PM CDT   [facility] Sp[facility] with Alycia Washburn PT   [facility] ([facility]  )    [address]  [address]  [facility] [address] [phone]              Who to contact     If you have questions or need follow up information about today's clinic visit or your schedule please contact [facility] directly at [phone].  Normal or non-critical lab and imaging results will be communicated to you by StyleHophart, letter or phone within 4 business days after the clinic has received the results. If you do not hear from us within 7 days, please contact the clinic through [employer] or phone. If you have a critical or abnormal lab result, we will notify you by phone as soon as possible.  Submit refill requests through Lolay or call your pharmacy and they will forward the refill request to us. Please allow 3 business days for your refill to be completed.          Additional Information About Your Visit        Lolay Information     Lolay gives you secure access to your electronic health record. If you see a primary care provider, you can also send messages to your care team and make appointments. If you have questions, please call your primary care clinic.  If you do not have a primary  "care provider, please call [phone] and they will assist you.        Care EveryWhere ID     This is your Care EveryWhere ID. This could be used by other organizations to access your [place] medical records  [id number]        Your Vitals Were     Height BMI (Body Mass Index)                5' 8\" (1.727 m) 32.23 kg/m2           Blood Pressure from Last 3 Encounters:   08/04/17 124/72   07/12/17 (!) 141/91   06/30/17 120/82    Weight from Last 3 Encounters:   08/04/17 212 lb (96.2 kg)   07/06/17 212 lb (96.2 kg)   06/30/17 212 lb (96.2 kg)              We Performed the Following     ORTHO  REFERRAL          Today's Medication Changes          These changes are accurate as of: 8/4/17  3:11 PM.  If you have any questions, ask your nurse or doctor.               Start taking these medicines.        Dose/Directions    gabapentin 300 MG capsule   Commonly known as:  NEURONTIN   Used for:  Cervical herniated disc, Cervical radiculopathy   Started by:  Ladi Pugh, DO        Take one tab at night for 3 days.  If able to tolerate, take one tab twice daily for 3 days, then take one tab three times daily.   Quantity:  90 capsule   Refills:  1            Where to get your medicines      These medications were sent to [facility]/pharmacy #[id number] - [place], [place] - [address] AT [address], [place] [address]     Phone:  [phone]     gabapentin 300 MG capsule                Primary Care Provider Office Phone # Fax #    Albert Chapman [phone]       [address] [place] [address]        Equal Access to Services     [facility]: Hadii omid Ball, bárbara murdock, qaybta miguelalisabella metcalf. So [facility] [phone].    ATENCIÓN: Si habla español, tiene a segura disposición servicios gratuitos de asistencia lingüística. Llame al [phone].    We comply with " applicable federal civil rights laws and [place] laws. We do not discriminate on the basis of race, color, national origin, age, disability sex, sexual orientation or gender identity.            Thank you!     Thank you for choosing [facility]  for your care. Our goal is always to provide you with excellent care. Hearing back from our patients is one way we can continue to improve our services. Please take a few minutes to complete the written survey that you may receive in the mail after your visit with us. Thank you!             Your Updated Medication List - Protect others around you: Learn how to safely use, store and throw away your medicines at www.disposemymeds.org.          This list is accurate as of: 8/4/17  3:11 PM.  Always use your most recent med list.                   Brand Name Dispense Instructions for use Diagnosis    albuterol 108 (90 BASE) MCG/ACT Inhaler    PROAIR HFA/PROVENTIL HFA/VENTOLIN HFA    3 Inhaler    Inhale 2 puffs into the lungs every 6 hours as needed for shortness of breath / dyspnea    Intermittent asthma       cyclobenzaprine 10 MG tablet    FLEXERIL    20 tablet    Take 1 tablet (10 mg) by mouth nightly as needed for muscle spasms    Cervical radicular pain, Muscle spasm       fluticasone 50 MCG/ACT spray    FLONASE    3 Package    Spray 1-2 sprays into both nostrils daily    Allergic rhinitis, cause unspecified       gabapentin 300 MG capsule    NEURONTIN    90 capsule    Take one tab at night for 3 days.  If able to tolerate, take one tab twice daily for 3 days, then take one tab three times daily.    Cervical herniated disc, Cervical radiculopathy       HYDROcodone-acetaminophen 5-325 MG per tablet    NORCO    10 tablet    Take 1 tablet by mouth every 4 hours as needed for pain maximum 6 tablet(s) per day    Encounter for sterilization       montelukast 10 MG tablet    SINGULAIR    90 tablet    Take 1 tablet (10 mg) by mouth At Bedtime    Intermittent  asthma, uncomplicated       ZYRTEC ALLERGY 10 MG Caps   Generic drug:  cetirizine HCl      Take by mouth daily

## 2017-08-04 NOTE — PATIENT INSTRUCTIONS
Thank you for allowing us to participate in your care today.  Please find below your visit diagnosis and the plan going forward.    1. Cervical radiculopathy    2. Cervical herniated disc      Reviewed progress - continued pain which is slowly returning. Currently one month after your VERONIQUE.  Recommend surgical referral and placed for Dr. Guy  Will discuss repeat injection with pain management  Letter for work: continue restrictions though 8/25/17.  Rx for Gabapentin. Discussed side effects and slow taper.    Follow up as needed. Call direct clinic number [[phone]] at any time with questions or concerns.    DO GUSTAVO FishM  [facility]  Website: [email or web address]  Twitter: [email or web address]

## 2017-08-04 NOTE — PROGRESS NOTES
"ASSESSMENT & PLAN    ICD-10-CM    1. Cervical radiculopathy M54.12 ORTHO  REFERRAL   2. Cervical herniated disc M50.20 ORTHO  REFERRAL   Reviewed progress - continued pain which is slowly returning. Currently one month after your VERONIQUE.  Recommend surgical referral and placed for Dr. Guy  Will discuss repeat injection with pain management  Letter for work: continue restrictions though 8/25/17.  Rx for Gabapentin. Discussed side effects and slow taper.    Follow up as needed. Call direct clinic number [[phone]] at any time with questions or concerns. Instructed to call the office if the condition evolves or worsens.    -----    SUBJECTIVE:  Phillip Nassar is a 51 year old male who is seen in follow-up for Cervical radiculopathy  (primary encounter diagnosis)  Cervical herniated disc. They were last seen 7/6/2017 been unchanged.    Since their last visit reports 30% improvement following VERONIQUE on 7/12/17. They indicate that their pain level is 6/10. They have tried ibuprofen/advil, other medications: Cyclobenzaprine (Flexeril) , home exercises, physical therapy (3 visits).      Notes continued pain with leaning forward, driving, or lifting objects. Notes pain is better when he lays on right side and sitting in a neutral/poor posture.     Patient's past medical, surgical, social, and family histories were reviewed today and no changes are noted.    REVIEW OF SYSTEMS:  Constitutional: NEGATIVE for fever, chills, change in weight  Skin: NEGATIVE for worrisome rashes, moles or lesions  GI/: NEGATIVE for bowel or bladder changes  Neuro: NEGATIVE for weakness, dizziness or paresthesias    OBJECTIVE:  /72 (BP Location: Right arm, Patient Position: Sitting, Cuff Size: Adult Large)  Ht 5' 8\" (1.727 m)  Wt 212 lb (96.2 kg)  BMI 32.23 kg/m2   General: healthy, alert and in no distress  HEENT: no scleral icterus or conjunctival erythema  Skin: no suspicious lesions or rash. No jaundice.  CV: " regular rhythm by palpation  Resp: normal respiratory effort without conversational dyspnea   Psych: normal mood and affect  Gait: normal steady gait with appropriate coordination and balance   MSK:  Deferred    Independent visualization of the below image:  None indicated at this time    Patient's conditions were thoroughly discussed during today's visit with greater than 50% of the visit spent counseling the patient with total time spent face-to-face with the patient being 15 minutes.    Ladi Pugh DO [facility]

## 2017-08-04 NOTE — LETTER
[place] [facility]  [address]  [address]  [facility] [address]  [phone]     August 4, 2017    Phillip Nassar  1965  [address]  [place] [address]    To Whom It May Concern:    Phillip was seen in clinic today for continued neck and left shoulder as a result of a herniated disc.  He had short term relief with the epidural steroid injection. I have recommend that he see a Neurosurgeon to discuss next steps. I have also started another medication and will attempt to repeat the epidural injection when able.    Phillip will need to continue with work restrictions of no lifting more than 10 pounds through 8/25/17.    Phillip will schedule to follow up in clinic prior to the above mentioned end date for re-evaluation and review of work restrictions.    Please feel free to contact myself or my Clinic Coordinator, Davi Junior, with any questions or concerns at the above number.    Sincerely,      Ladi Pugh DO, [facility]  Davi Junior [facility], MAEd on behalf of Dr. Pugh

## 2017-08-09 ENCOUNTER — THERAPY VISIT (OUTPATIENT)
Dept: PHYSICAL THERAPY | Facility: CLINIC | Age: 52
End: 2017-08-09
Payer: COMMERCIAL

## 2017-08-09 DIAGNOSIS — M54.12 CERVICAL RADICULOPATHY: ICD-10-CM

## 2017-08-09 PROCEDURE — 97530 THERAPEUTIC ACTIVITIES: CPT | Mod: GP | Performed by: PHYSICAL THERAPIST

## 2017-08-09 PROCEDURE — 97012 MECHANICAL TRACTION THERAPY: CPT | Mod: GP | Performed by: PHYSICAL THERAPIST

## 2017-08-16 ENCOUNTER — THERAPY VISIT (OUTPATIENT)
Dept: PHYSICAL THERAPY | Facility: CLINIC | Age: 52
End: 2017-08-16
Payer: COMMERCIAL

## 2017-08-16 DIAGNOSIS — M54.12 CERVICAL RADICULOPATHY: ICD-10-CM

## 2017-08-16 PROCEDURE — 97110 THERAPEUTIC EXERCISES: CPT | Mod: GP | Performed by: PHYSICAL THERAPIST

## 2017-08-16 PROCEDURE — 97012 MECHANICAL TRACTION THERAPY: CPT | Mod: GP | Performed by: PHYSICAL THERAPIST

## 2017-08-16 NOTE — MR AVS SNAPSHOT
After Visit Summary   8/16/2017    Phillip Nassar    MRN: [id number]           Patient Information     Date Of Birth          1965        Visit Information        Provider Department      8/16/2017 3:50 PM Alycia Washburn, PT [facility]        Today's Diagnoses     Cervical radiculopathy           Follow-ups after your visit        Your next 10 appointments already scheduled     Aug 25, 2017  2:10 PM CDT   [facility] Spine with JEFFREY Oneill PT ([facility]  )    [address] [address] [address]   [phone]              Who to contact     If you have questions or need follow up information about today's clinic visit or your schedule please contact [facility] directly at [phone].  Normal or non-critical lab and imaging results will be communicated to you by MyChart, letter or phone within 4 business days after the clinic has received the results. If you do not hear from us within 7 days, please contact the clinic through MyChart or phone. If you have a critical or abnormal lab result, we will notify you by phone as soon as possible.  Submit refill requests through [employer]Chiamo or call your pharmacy and they will forward the refill request to us. Please allow 3 business days for your refill to be completed.          Additional Information About Your Visit        MyChart Information     [employer] gives you secure access to your electronic health record. If you see a primary care provider, you can also send messages to your care team and make appointments. If you have questions, please call your primary care clinic.  If you do not have a primary care provider, please call [phone] and they will assist you.        Care EveryWhere ID     This is your Care EveryWhere ID. This could be used by other organizations to access your [place] medical records  [id number]         Blood Pressure from Last 3 Encounters:   08/04/17 124/72   07/12/17 (!)  141/91   06/30/17 120/82    Weight from Last 3 Encounters:   08/04/17 96.2 kg (212 lb)   07/06/17 96.2 kg (212 lb)   06/30/17 96.2 kg (212 lb)              We Performed the Following     MECHANICAL TRACTION THERAPY     THERAPEUTIC EXERCISES        Primary Care Provider Office Phone # Fax #    Albert Chapman [phone] [address] [place] [address]        Equal Access to Services     [facility]: Hadii aad ku hadasho Soomaali, waaxda luqadaha, qaybta kaalmada adeegyada, waxay idiin hayaan adeeg kharash la'aan . So [facility] [phone].    ATENCIÓN: Si habla español, tiene a segura disposición servicios gratuitos de asistencia lingüística. [facility] [phone].    We comply with applicable federal civil rights laws and [place] laws. We do not discriminate on the basis of race, color, national origin, age, disability sex, sexual orientation or gender identity.            Thank you!     Thank you for choosing [facility]  for your care. Our goal is always to provide you with excellent care. Hearing back from our patients is one way we can continue to improve our services. Please take a few minutes to complete the written survey that you may receive in the mail after your visit with us. Thank you!             Your Updated Medication List - Protect others around you: Learn how to safely use, store and throw away your medicines at www.disposemymeds.org.          This list is accurate as of: 8/16/17  5:05 PM.  Always use your most recent med list.                   Brand Name Dispense Instructions for use Diagnosis    albuterol 108 (90 BASE) MCG/ACT Inhaler    PROAIR HFA/PROVENTIL HFA/VENTOLIN HFA    3 Inhaler    Inhale 2 puffs into the lungs every 6 hours as needed for shortness of breath / dyspnea    Intermittent asthma       cyclobenzaprine 10 MG tablet    FLEXERIL    20 tablet    Take 1 tablet (10 mg) by mouth nightly as needed for muscle spasms    Cervical radicular  pain, Muscle spasm       fluticasone 50 MCG/ACT spray    FLONASE    3 Package    Spray 1-2 sprays into both nostrils daily    Allergic rhinitis, cause unspecified       gabapentin 300 MG capsule    NEURONTIN    90 capsule    Take one tab at night for 3 days.  If able to tolerate, take one tab twice daily for 3 days, then take one tab three times daily.    Cervical herniated disc, Cervical radiculopathy       HYDROcodone-acetaminophen 5-325 MG per tablet    NORCO    10 tablet    Take 1 tablet by mouth every 4 hours as needed for pain maximum 6 tablet(s) per day    Encounter for sterilization       montelukast 10 MG tablet    SINGULAIR    90 tablet    Take 1 tablet (10 mg) by mouth At Bedtime    Intermittent asthma, uncomplicated       ZYRTEC ALLERGY 10 MG Caps   Generic drug:  cetirizine HCl      Take by mouth daily

## 2017-08-16 NOTE — PROGRESS NOTES
Subjective:    HPI                    Objective:    System    Physical Exam    General     ROS    Assessment/Plan:      DISCHARGE REPORT    Progress reporting period is from 6-20-17 to 8-16-17.       SUBJECTIVE  Subjective changes noted by patient:  .  Subjective: Some improvement in his L UE pain--less pain at elbow; feels neck ROM is less becuase he is not chasing a direction, feels arm is better due to this.    Current pain level is 3/10  .     Previous pain level was  7/10 Initial Pain level: 7/10.   Changes in function:  Yes (See Goal flowsheet attached for changes in current functional level)  Adverse reaction to treatment or activity: None    OBJECTIVE  Changes noted in objective findings:  The objective findings below are from DOS 8-16-17.  Objective: Continued classification of MUR; NDI=     ASSESSMENT/PLAN  Updated problem list and treatment plan: Diagnosis 1:  Cervical radiculopathy  Pain -  self management, education, directional preference exercise and home program  Decreased ROM/flexibility - manual therapy and therapeutic exercise  Decreased strength - therapeutic exercise and therapeutic activities  Decreased function - therapeutic activities  STG/LTGs have been met or progress has been made towards goals:  Yes, but unable to progress further.   Assessment of Progress: The patient's progress has plateaued.  Self Management Plans:  Patient is independent in a home treatment program.  I have re-evaluated this patient and find that the nature, scope, duration and intensity of the therapy is appropriate for the medical condition of the patient.  Phillip continues to require the following intervention to meet STG and LTG's:  PT intervention is no longer required to meet STG/LTG.    Recommendations:  This patient would benefit from further evaluation.    Please refer to the daily flowsheet for treatment today, total treatment time and time spent performing 1:1 timed codes.

## 2017-08-29 ENCOUNTER — MYC MEDICAL ADVICE (OUTPATIENT)
Dept: ORTHOPEDICS | Facility: CLINIC | Age: 52
End: 2017-08-29

## 2017-08-29 DIAGNOSIS — M54.12 CERVICAL RADICULOPATHY: Primary | ICD-10-CM

## 2017-08-29 DIAGNOSIS — M48.02 FORAMINAL STENOSIS OF CERVICAL REGION: ICD-10-CM

## 2017-09-18 DIAGNOSIS — Z30.2 ENCOUNTER FOR STERILIZATION: ICD-10-CM

## 2017-09-18 LAB — SEMEN ANALYSIS P VAS PNL: ABNORMAL

## 2017-09-18 PROCEDURE — 89321 SEMEN ANAL SPERM DETECTION: CPT | Performed by: UROLOGY

## 2017-11-27 ENCOUNTER — MYC MEDICAL ADVICE (OUTPATIENT)
Dept: ORTHOPEDICS | Facility: CLINIC | Age: 52
End: 2017-11-27

## 2017-11-28 ENCOUNTER — MYC MEDICAL ADVICE (OUTPATIENT)
Dept: ORTHOPEDICS | Facility: CLINIC | Age: 52
End: 2017-11-28

## 2017-11-28 DIAGNOSIS — M54.12 CERVICAL RADICULOPATHY: Primary | ICD-10-CM

## 2017-12-08 DIAGNOSIS — Z30.2 ENCOUNTER FOR STERILIZATION: Primary | ICD-10-CM

## 2017-12-08 LAB — SEMEN ANALYSIS P VAS PNL: ABNORMAL

## 2017-12-08 PROCEDURE — 89321 SEMEN ANAL SPERM DETECTION: CPT | Performed by: UROLOGY

## 2017-12-20 ENCOUNTER — RADIANT APPOINTMENT (OUTPATIENT)
Dept: GENERAL RADIOLOGY | Facility: CLINIC | Age: 52
End: 2017-12-20
Attending: FAMILY MEDICINE
Payer: COMMERCIAL

## 2017-12-20 VITALS
OXYGEN SATURATION: 97 % | TEMPERATURE: 97.7 F | RESPIRATION RATE: 18 BRPM | HEART RATE: 71 BPM | SYSTOLIC BLOOD PRESSURE: 139 MMHG | DIASTOLIC BLOOD PRESSURE: 83 MMHG

## 2017-12-20 DIAGNOSIS — M54.12 CERVICAL RADICULOPATHY: ICD-10-CM

## 2017-12-20 RX ORDER — LIDOCAINE HYDROCHLORIDE 10 MG/ML
5 INJECTION, SOLUTION EPIDURAL; INFILTRATION; INTRACAUDAL; PERINEURAL ONCE
Status: COMPLETED | OUTPATIENT
Start: 2017-12-20 | End: 2017-12-20

## 2017-12-20 RX ORDER — IOPAMIDOL 408 MG/ML
20 INJECTION, SOLUTION INTRATHECAL ONCE
Status: COMPLETED | OUTPATIENT
Start: 2017-12-20 | End: 2017-12-20

## 2017-12-20 RX ORDER — DEXAMETHASONE SODIUM PHOSPHATE 10 MG/ML
10 INJECTION, SOLUTION INTRAMUSCULAR; INTRAVENOUS ONCE
Status: COMPLETED | OUTPATIENT
Start: 2017-12-20 | End: 2017-12-20

## 2017-12-20 RX ADMIN — DEXAMETHASONE SODIUM PHOSPHATE 10 MG: 10 INJECTION, SOLUTION INTRAMUSCULAR; INTRAVENOUS at 11:13

## 2017-12-20 RX ADMIN — LIDOCAINE HYDROCHLORIDE 50 MG: 10 INJECTION, SOLUTION EPIDURAL; INFILTRATION; INTRACAUDAL; PERINEURAL at 11:13

## 2017-12-20 RX ADMIN — IOPAMIDOL 2 ML: 408 INJECTION, SOLUTION INTRATHECAL at 11:09

## 2017-12-20 NOTE — MR AVS SNAPSHOT
MRN:[id number]                      After Visit Summary   12/20/2017    Phillip Nassar    MRN: [id number]           Visit Information        Provider Department      12/20/2017 11:00 AM  NEURO RAD;  IMAGING NURSE; XR3 [facility]           Review of your medicines          These changes are accurate as of: 12/20/17 10:42 AM.  If you have any questions, ask your nurse or doctor.               CONTINUE these medicines which have NOT CHANGED        Dose / Directions    albuterol 108 (90 BASE) MCG/ACT Inhaler   Commonly known as:  PROAIR HFA/PROVENTIL HFA/VENTOLIN HFA   Used for:  Intermittent asthma        Dose:  2 puff   Inhale 2 puffs into the lungs every 6 hours as needed for shortness of breath / dyspnea   Quantity:  3 Inhaler   Refills:  1       cyclobenzaprine 10 MG tablet   Commonly known as:  FLEXERIL   Used for:  Cervical radicular pain, Muscle spasm        Dose:  10 mg   Take 1 tablet (10 mg) by mouth nightly as needed for muscle spasms   Quantity:  20 tablet   Refills:  0       fluticasone 50 MCG/ACT spray   Commonly known as:  FLONASE   Used for:  Allergic rhinitis, cause unspecified        Dose:  1-2 spray   Spray 1-2 sprays into both nostrils daily   Quantity:  3 Package   Refills:  3       gabapentin 300 MG capsule   Commonly known as:  NEURONTIN   Used for:  Cervical herniated disc, Cervical radiculopathy        Take one tab at night for 3 days.  If able to tolerate, take one tab twice daily for 3 days, then take one tab three times daily.   Quantity:  90 capsule   Refills:  1       HYDROcodone-acetaminophen 5-325 MG per tablet   Commonly known as:  NORCO   Used for:  Encounter for sterilization        Dose:  1 tablet   Take 1 tablet by mouth every 4 hours as needed for pain maximum 6 tablet(s) per day   Quantity:  10 tablet   Refills:  0       montelukast 10 MG tablet   Commonly known as:  SINGULAIR   Used for:  Intermittent asthma, uncomplicated        Dose:   10 mg   Take 1 tablet (10 mg) by mouth At Bedtime   Quantity:  90 tablet   Refills:  3       ZYRTEC ALLERGY 10 MG Caps   Generic drug:  cetirizine HCl        Take by mouth daily   Refills:  0                Protect others around you: Learn how to safely use, store and throw away your medicines at www.disposemymeds.org.         Follow-ups after your visit        Your next 10 appointments already scheduled     Dec 20, 2017 11:00 AM CST   XR CERVIC/THORACIC TRANSFORAMINAL INJECTION with TARI3,  IMAGING NURSE,  NEURO RAD   [facility] ([facility])    [address] [address] [phone]           For nerve root injection, please send or bring copies of any MRIs or other scans you have had.  Bring a list of your current medicines to your exam. (Include vitamins, minerals and over-the-counter medicines.) Leave your valuables at home.  Plan to have someone drive you home afterward.  Stop taking the following medicines (but talk to your doctor first):   If you take blood thinners, you may need to stop taking them a few days before treatment. Talk to your doctor before stopping these medicines.Stop taking Coumadin (warfarin) 3 days before treatment. Restart the day after treatment.   If you take Plavix, Ticlid, Pletal or Persantine, please ask your doctor if you should stop these medicines. You may need extra tests on the morning of your scan.   If you take Xarelto (Rivaroxaban), you may need to stop taking it 24 hours before treatment. Talk to your doctor before stopping this medicine. Restart the day after treatment.  You may take your other medicines as normal.  Stop all food and drink (including water) 3 hours before your test or treatment.  Please tell the doctor:   If you are allergic to X-ray dye (contrast fluid).   If you may be pregnant.  Injections take about 30 to 45 minutes. Most people spend up to 2 hours in the clinic or  hospital.  Please call the Imaging Department at your exam site with any questions.               Care Instructions        Further instructions from your care team       Discharge Instructions for Cervical Steroid Injection    Care of injection site:    If you have new numbness down your arm after the injection, this may last up to 4-6 hours, but should go away.     Over the next 24 to 48 hours, pain at the injection site may increase before it gets better.    For the next 48 hours, use ice packs for 15 minutes, 3-4 times a day for pain relief.    If you have a bandage, you may remove it the next morning         Do not submerge injection site in water for 24 hours, (no baths. pools, hot tubs). Showers are OK.              Activity:    You should relax today, and then you may return to your regular activity.    You may eat a normal diet.  Medicines:    Restart all your medicines tomorrow at your regular dose, including Coumadin (Warfarin).    If you are restarting Coumadin, talk to your doctor about having your INR checked.      If you received steroids: The steroid should help reduce swelling and pain. This may take from 3-14 days. Side effects from the shot will be mild and go away in 2-3 days.     Common side effects may include:    Insomnia    Heartburn    Flushed face    Water retention    Increased appetite    Increased blood sugar    If you have diabetes, watch your blood sugar closely, If needed, call your doctor to help control your blood sugar.    Some patients will get lasting relief from a single injection. Others may require up to 3 injections to get results. If you have more than one steroid injection, they should be given 2 weeks apart. Some patients do not receive relief of symptoms.    Call your doctor or go to the Emergency Room if you have new severe pain, fever, or loss of control of your arms.     Additional Information About Your Visit        [employer] Information     [employer] gives you secure  access to your electronic health record. If you see a primary care provider, you can also send messages to your care team and make appointments. If you have questions, please call your primary care clinic.  If you do not have a primary care provider, please call [phone] and they will assist you.      [employer] is an electronic gateway that provides easy, online access to your medical records. With [employer], you can request a clinic appointment, read your test results, renew a prescription or communicate with your care team.     To access your existing account, please contact your [facility] or call [phone] for assistance.        Care EveryWhere ID     This is your Care EveryWhere ID. This could be used by other organizations to access your [facility] medical records  [id number]        Your Vitals Were     Blood Pressure Pulse Temperature Respirations Pulse Oximetry       139/83 71 97.7  F (36.5  C) (Oral) 18 97%        Primary Care Provider Office Phone # Fax #    Albert Chapman [phone]      Equal Access to Services     SANDOR DUNBAR : Hadii aad ku hadasho Soomaali, waaxda luqadaha, qaybta kaalmada adeegyada, waxay franco salazar . So [facility] [phone].    ATENCIÓN: Si habla español, tiene a segura disposición servicios gratuitos de asistencia lingüística. Llame al [phone].    We comply with applicable federal civil rights laws and [place] laws. We do not discriminate on the basis of race, color, national origin, age, disability, sex, sexual orientation, or gender identity.            Thank you!     Thank you for choosing [facility] for your care. Our goal is always to provide you with excellent care. Hearing back from our patients is one way we can continue to improve our services. Please take a few minutes to complete the written survey that you may receive in the mail after you visit with us. Thank you!             Medication List:  This is a list of all your medications and when to take them. Check marks below indicate your daily home schedule. Keep this list as a reference.          This list is accurate as of: 12/20/17 10:42 AM.  Always use your most recent med list.               Medications           Morning Afternoon Evening Bedtime As Needed    albuterol 108 (90 BASE) MCG/ACT Inhaler   Commonly known as:  PROAIR HFA/PROVENTIL HFA/VENTOLIN HFA   Inhale 2 puffs into the lungs every 6 hours as needed for shortness of breath / dyspnea                                cyclobenzaprine 10 MG tablet   Commonly known as:  FLEXERIL   Take 1 tablet (10 mg) by mouth nightly as needed for muscle spasms                                fluticasone 50 MCG/ACT spray   Commonly known as:  FLONASE   Spray 1-2 sprays into both nostrils daily                                gabapentin 300 MG capsule   Commonly known as:  NEURONTIN   Take one tab at night for 3 days.  If able to tolerate, take one tab twice daily for 3 days, then take one tab three times daily.                                HYDROcodone-acetaminophen 5-325 MG per tablet   Commonly known as:  NORCO   Take 1 tablet by mouth every 4 hours as needed for pain maximum 6 tablet(s) per day                                montelukast 10 MG tablet   Commonly known as:  SINGULAIR   Take 1 tablet (10 mg) by mouth At Bedtime                                ZYRTEC ALLERGY 10 MG Caps   Take by mouth daily   Generic drug:  cetirizine HCl

## 2017-12-20 NOTE — DISCHARGE INSTRUCTIONS
Discharge Instructions for Cervical Steroid Injection    Care of injection site:    If you have new numbness down your arm after the injection, this may last up to 4-6 hours, but should go away.     Over the next 24 to 48 hours, pain at the injection site may increase before it gets better.    For the next 48 hours, use ice packs for 15 minutes, 3-4 times a day for pain relief.    If you have a bandage, you may remove it the next morning         Do not submerge injection site in water for 24 hours, (no baths. pools, hot tubs). Showers are OK.              Activity:    You should relax today, and then you may return to your regular activity.    You may eat a normal diet.  Medicines:    Restart all your medicines tomorrow at your regular dose, including Coumadin (Warfarin).    If you are restarting Coumadin, talk to your doctor about having your INR checked.      If you received steroids: The steroid should help reduce swelling and pain. This may take from 3-14 days. Side effects from the shot will be mild and go away in 2-3 days.     Common side effects may include:    Insomnia    Heartburn    Flushed face    Water retention    Increased appetite    Increased blood sugar    If you have diabetes, watch your blood sugar closely, If needed, call your doctor to help control your blood sugar.    Some patients will get lasting relief from a single injection. Others may require up to 3 injections to get results. If you have more than one steroid injection, they should be given 2 weeks apart. Some patients do not receive relief of symptoms.    Call your doctor or go to the Emergency Room if you have new severe pain, fever, or loss of control of your arms.

## 2017-12-20 NOTE — PROGRESS NOTES
Pt did well with the procedure.  No complications. VSS. AVS given and pt escorted to the waiting room.ams

## 2018-06-29 DIAGNOSIS — Z30.2 ENCOUNTER FOR STERILIZATION: Primary | ICD-10-CM

## 2018-06-29 LAB — SEMEN ANALYSIS P VAS PNL: NORMAL

## 2018-06-29 PROCEDURE — 89321 SEMEN ANAL SPERM DETECTION: CPT | Performed by: UROLOGY

## 2018-06-29 NOTE — LETTER
Patient:  Phillip Nassar  :   1965  MRN:     [id number]        Mr.Lee PARVIZ Nassar  [address], [place] [address]        2018    Dear ,    We are writing to inform you of your test results.  Your sperm count was negative.  You can stop any form of birth control you are using.  Thank you.    Korina Freeman LPN        Resulted Orders   Semen Analysis Post Vasectomy [[id number]]   Result Value Ref Range    Post Vas Analysis No Sperm Seen NOSPRM^No Sperm Seen                     [id number]  1965

## 2019-01-31 NOTE — PATIENT INSTRUCTIONS
[facility] Procedure Discharge Instructions    Today you saw:   Dr. Krissy Lackey    Your procedure:  Cervical epidural steroid injection       Medications used:  Lidocaine (anesthetic)  Dexamethasone (steroid)    Omnipaque (contrast)                Be cautious when walking as unsteadiness in the legs may occur up to 6-8 hours after the procedure     Do not drive for 6 hours. The effect of the local anesthetic could slow your reflexes.     Avoid strenuous activity for the first 24 hours. You may resume your regular activities after that.     You may shower, however avoid swimming, tub baths or hot tubs for 24 hours following your procedure    You may have a mild to moderate increase in pain for several days following the injection.      You may use ice packs for 10-15 minutes, 3 to 4 times a day at the injection site for comfort    Do not use heat to painful areas for 6 to 8 hours. This will give the local anesthetic time to wear off and prevent you from accidentally burning your skin.    You may use anti-inflammatory medications (such as Ibuprofen/Advil or Aleve) or Tylenol for pain control if necessary    It may take up to 14 days for the steroid medication to start working although you may feel the effect as early as a few days after the procedure.       If you experience any of the following, call the pain center line during work hours at [phone] or on-call physician after hours at [phone]:  -Fever over 100 degree F  -Swelling, bleeding, redness, drainage, warmth at the injection site  -Progressive weakness or numbness in your arms  -Unusual headache that is not relieved by Tylenol or your regular headache medication  -Unusual new onset of pain that is not improving    Phone #s:  Nurse triage line for general questions: [phone]           Subjective:  Jericho Pappas is a 57 year old  male seen for concerns regarding adjustment to stress and chronic pain and health problems with depression.  The patient reported continued stress about the upcoming surgery.  He is having difficulty re-building his strength.  He has not been able to help with care of the grandchildren, which had helped him feel he had a purpose.    Objective:  The patient further discussed his current stressors.  He participated willingly but struggled with the concepts of cognitive-behavioral therapy as they were incorporated in therapy to increase skills and knowledge of stress management, coping strategies, multiple methods of coping with medical illness and/or pain, personal triggers, and social support.      The patient presented pleasant and cooperative.  He was alert and oriented.  Mood was dysphoric.  Affect was notable.  Eye contact was appropriate. Hygiene and dress were casual and neat.       Speech was clear and fluent, with normal rate, rhythm, and volume.  Comprehension appeared adequate.  Thoughts were scattered and rambling.  Insight was fair.  Attention was distracted. No evidence of formal thought disorders or hallucinations was observed.   The patient did not show evidence of suicidal or homicidal ideation.  He showed fair motivation, frustration tolerance, and self-awareness.   No behavioral abnormalities were noted. The patient was able to understand and respond meaningfully to this interaction.    Assessment:  This is a 57 year old male who was seen for the treatment of  1. Major depressive disorder, recurrent episode, moderate (CMS/HCC)    2. Chronic pain syndrome    .      This visit was 50 minutes in duration, which was spent in face-to-face contact with the patient providing cognitive-behavioral health psychological therapy and counseling him as to the plan outlined below.    Plan:  1. The patient will be seen for continued cognitive-behavioral health  psychology therapy for these diagnoses in 2 weeks.  2. The patient is encouraged to continue medications as prescribed and to consult regularly with his primary care provider.  3. The patient is encouraged to use regular physical exercise for health and stress management.  4. The patient is encouraged to use social interaction to decrease isolation and improve social support.  5. The patient is encouraged to use positive distraction, such as hobbies or volunteering to improve social support, decrease isolation, and reduce emotional distress.  6. The patient is encouraged to continue treatment by his other specialty provider(s) and to take medications as prescribed.  .      Thank you for allowing me to participate in this patient's care.

## 2019-02-14 ENCOUNTER — HOSPITAL ENCOUNTER (EMERGENCY)
Facility: CLINIC | Age: 54
Discharge: HOME | End: 2019-02-14
Attending: EMERGENCY MEDICINE | Admitting: EMERGENCY MEDICINE
Payer: COMMERCIAL

## 2019-02-14 ENCOUNTER — APPOINTMENT (OUTPATIENT)
Dept: CT IMAGING | Facility: CLINIC | Age: 54
End: 2019-02-14
Attending: EMERGENCY MEDICINE
Payer: COMMERCIAL

## 2019-02-14 VITALS
DIASTOLIC BLOOD PRESSURE: 89 MMHG | TEMPERATURE: 98.6 F | RESPIRATION RATE: 16 BRPM | HEART RATE: 82 BPM | SYSTOLIC BLOOD PRESSURE: 144 MMHG | OXYGEN SATURATION: 94 %

## 2019-02-14 DIAGNOSIS — K57.32 DIVERTICULITIS OF COLON: ICD-10-CM

## 2019-02-14 LAB
ALBUMIN UR-MCNC: NEGATIVE MG/DL
ANION GAP SERPL CALCULATED.3IONS-SCNC: 7 MMOL/L (ref 3–14)
APPEARANCE UR: CLEAR
BASOPHILS # BLD AUTO: 0 10E9/L (ref 0–0.2)
BASOPHILS NFR BLD AUTO: 0.3 %
BILIRUB UR QL STRIP: NEGATIVE
BUN SERPL-MCNC: 11 MG/DL (ref 7–30)
CALCIUM SERPL-MCNC: 8.7 MG/DL (ref 8.5–10.1)
CHLORIDE SERPL-SCNC: 107 MMOL/L (ref 94–109)
CO2 SERPL-SCNC: 26 MMOL/L (ref 20–32)
COLOR UR AUTO: ABNORMAL
CREAT SERPL-MCNC: 0.83 MG/DL (ref 0.66–1.25)
DIFFERENTIAL METHOD BLD: ABNORMAL
EOSINOPHIL # BLD AUTO: 0.5 10E9/L (ref 0–0.7)
EOSINOPHIL NFR BLD AUTO: 4.4 %
ERYTHROCYTE [DISTWIDTH] IN BLOOD BY AUTOMATED COUNT: 13.2 % (ref 10–15)
GFR SERPL CREATININE-BSD FRML MDRD: >90 ML/MIN/{1.73_M2}
GLUCOSE SERPL-MCNC: 86 MG/DL (ref 70–99)
GLUCOSE UR STRIP-MCNC: NEGATIVE MG/DL
HCT VFR BLD AUTO: 47.6 % (ref 40–53)
HGB BLD-MCNC: 15.8 G/DL (ref 13.3–17.7)
HGB UR QL STRIP: NEGATIVE
IMM GRANULOCYTES # BLD: 0 10E9/L (ref 0–0.4)
IMM GRANULOCYTES NFR BLD: 0.3 %
KETONES UR STRIP-MCNC: NEGATIVE MG/DL
LEUKOCYTE ESTERASE UR QL STRIP: NEGATIVE
LYMPHOCYTES # BLD AUTO: 1.6 10E9/L (ref 0.8–5.3)
LYMPHOCYTES NFR BLD AUTO: 14 %
MCH RBC QN AUTO: 28.4 PG (ref 26.5–33)
MCHC RBC AUTO-ENTMCNC: 33.2 G/DL (ref 31.5–36.5)
MCV RBC AUTO: 86 FL (ref 78–100)
MONOCYTES # BLD AUTO: 1.1 10E9/L (ref 0–1.3)
MONOCYTES NFR BLD AUTO: 9.9 %
MUCOUS THREADS #/AREA URNS LPF: PRESENT /LPF
NEUTROPHILS # BLD AUTO: 7.9 10E9/L (ref 1.6–8.3)
NEUTROPHILS NFR BLD AUTO: 71.1 %
NITRATE UR QL: NEGATIVE
NRBC # BLD AUTO: 0 10*3/UL
NRBC BLD AUTO-RTO: 0 /100
PH UR STRIP: 8 PH (ref 5–7)
PLATELET # BLD AUTO: 240 10E9/L (ref 150–450)
POTASSIUM SERPL-SCNC: 4.1 MMOL/L (ref 3.4–5.3)
RBC # BLD AUTO: 5.57 10E12/L (ref 4.4–5.9)
RBC #/AREA URNS AUTO: 1 /HPF (ref 0–2)
SODIUM SERPL-SCNC: 140 MMOL/L (ref 133–144)
SOURCE: ABNORMAL
SP GR UR STRIP: 1.03 (ref 1–1.03)
SQUAMOUS #/AREA URNS AUTO: <1 /HPF (ref 0–1)
UROBILINOGEN UR STRIP-MCNC: 0 MG/DL (ref 0–2)
WBC # BLD AUTO: 11.1 10E9/L (ref 4–11)
WBC #/AREA URNS AUTO: <1 /HPF (ref 0–5)

## 2019-02-14 PROCEDURE — 25000128 H RX IP 250 OP 636: Performed by: EMERGENCY MEDICINE

## 2019-02-14 PROCEDURE — 96374 THER/PROPH/DIAG INJ IV PUSH: CPT | Mod: 59

## 2019-02-14 PROCEDURE — 96376 TX/PRO/DX INJ SAME DRUG ADON: CPT

## 2019-02-14 PROCEDURE — 74177 CT ABD & PELVIS W/CONTRAST: CPT

## 2019-02-14 PROCEDURE — 85025 COMPLETE CBC W/AUTO DIFF WBC: CPT | Performed by: EMERGENCY MEDICINE

## 2019-02-14 PROCEDURE — 96361 HYDRATE IV INFUSION ADD-ON: CPT

## 2019-02-14 PROCEDURE — 25000132 ZZH RX MED GY IP 250 OP 250 PS 637: Performed by: EMERGENCY MEDICINE

## 2019-02-14 PROCEDURE — 81001 URINALYSIS AUTO W/SCOPE: CPT | Performed by: EMERGENCY MEDICINE

## 2019-02-14 PROCEDURE — 99285 EMERGENCY DEPT VISIT HI MDM: CPT | Mod: 25

## 2019-02-14 PROCEDURE — 80048 BASIC METABOLIC PNL TOTAL CA: CPT | Performed by: EMERGENCY MEDICINE

## 2019-02-14 RX ORDER — OXYCODONE HYDROCHLORIDE 5 MG/1
5-10 TABLET ORAL EVERY 6 HOURS PRN
Qty: 14 TABLET | Refills: 0 | Status: SHIPPED | OUTPATIENT
Start: 2019-02-14 | End: 2019-02-17

## 2019-02-14 RX ORDER — CIPROFLOXACIN 500 MG/1
500 TABLET, FILM COATED ORAL ONCE
Status: COMPLETED | OUTPATIENT
Start: 2019-02-14 | End: 2019-02-14

## 2019-02-14 RX ORDER — METRONIDAZOLE 500 MG/1
500 TABLET ORAL 3 TIMES DAILY
Qty: 30 TABLET | Refills: 0 | Status: SHIPPED | OUTPATIENT
Start: 2019-02-14 | End: 2019-02-24

## 2019-02-14 RX ORDER — METRONIDAZOLE 500 MG/1
500 TABLET ORAL ONCE
Status: COMPLETED | OUTPATIENT
Start: 2019-02-14 | End: 2019-02-14

## 2019-02-14 RX ORDER — CIPROFLOXACIN 500 MG/1
500 TABLET, FILM COATED ORAL 2 TIMES DAILY
Qty: 20 TABLET | Refills: 0 | Status: SHIPPED | OUTPATIENT
Start: 2019-02-14 | End: 2019-02-24

## 2019-02-14 RX ORDER — IOPAMIDOL 755 MG/ML
100 INJECTION, SOLUTION INTRAVASCULAR ONCE
Status: COMPLETED | OUTPATIENT
Start: 2019-02-14 | End: 2019-02-14

## 2019-02-14 RX ORDER — HYDROMORPHONE HYDROCHLORIDE 1 MG/ML
0.5 INJECTION, SOLUTION INTRAMUSCULAR; INTRAVENOUS; SUBCUTANEOUS
Status: DISCONTINUED | OUTPATIENT
Start: 2019-02-14 | End: 2019-02-14 | Stop reason: HOSPADM

## 2019-02-14 RX ADMIN — IOPAMIDOL 100 ML: 755 INJECTION, SOLUTION INTRAVENOUS at 10:11

## 2019-02-14 RX ADMIN — SODIUM CHLORIDE 65 ML: 9 INJECTION, SOLUTION INTRAVENOUS at 10:11

## 2019-02-14 RX ADMIN — SODIUM CHLORIDE 1000 ML: 9 INJECTION, SOLUTION INTRAVENOUS at 10:05

## 2019-02-14 RX ADMIN — METRONIDAZOLE 500 MG: 500 TABLET ORAL at 11:01

## 2019-02-14 RX ADMIN — Medication 0.5 MG: at 11:00

## 2019-02-14 RX ADMIN — CIPROFLOXACIN HYDROCHLORIDE 500 MG: 500 TABLET, FILM COATED ORAL at 11:01

## 2019-02-14 RX ADMIN — Medication 0.5 MG: at 10:04

## 2019-02-14 ASSESSMENT — ENCOUNTER SYMPTOMS
DYSURIA: 0
FEVER: 0
DIARRHEA: 0
VOMITING: 0
ABDOMINAL PAIN: 1
NAUSEA: 0
CONSTIPATION: 0
APPETITE CHANGE: 1

## 2019-02-14 NOTE — DISCHARGE INSTRUCTIONS
Discharge Instructions  Diverticulitis  Your provider has diagnosed you with diverticulitis.  Diverticulitis is an infection of a diverticulum, which is a tiny sack-like structure that protrudes off the wall of the colon (large intestine).  These sacks are created over years of increased pressure in the colon (this is diverticulosis), usually as a result of a diet without enough fruits, vegetables, and whole grains. Because these sacks are small, bacteria can get trapped inside them and cause an infection (this is divericulitis).  This infection often causes abdominal (belly) pain, fever, nausea (sick to stomach), and vomiting (throwing up). Diverticulitis is usually treated at home.  However, sometimes diverticulitis needs treatment in the hospital and may even need surgery.    Generally, every Emergency Department visit should have a follow-up clinic visit with either a primary or a specialty clinic/provider. Please follow-up as instructed by your emergency provider today.    Return to the Emergency Department if:   You get an oral temperature above 102oF or as directed by your provider.  You have blood in your stools (bright red or black, tarry stools), or have blood in your vomit.  You keep vomiting or cannot drink liquids or cannot keep your medicine down.  You cannot have a bowel movement or you cannot pass gas.  Your stomach gets bloated or bigger.  You faint or become very weak.  Your pain is too bad to tolerate.  You have new symptoms or anything that worries you.    What can I do to help myself?  Fill any antibiotic prescriptions the provider gave you and take them right away. Be sure to finish the whole antibiotic prescription.  For the first day or two at home drink only clear liquids.  This lets your intestines rest.  If your pain has improved after one or two days, you may start eating mild foods. Soda crackers, toast, plain noodles, gelatin, applesauce and bananas are good first choices.  Avoid foods  "that have acid, are spicy, fatty or fibrous (such as meats, coarse grains, vegetables). You may start eating these foods again in about 3-4 days when you are better.  Once you are back to normal, eat a high fiber diet of fruits, vegetables and whole grains. Some people think you should avoid eating nuts, seeds, and corn, but there is no definite proof this makes any difference in whether you will get diverticulitis again.   Pain and fever can be treated with Tylenol  (acetaminophen) or you might have been prescribed a pain medication.    Probiotics: If you have been given an antibiotic, you may want to also take a probiotic pill or eat yogurt with live cultures. Probiotics have \"good bacteria\" to help your intestines stay healthy. Studies have shown that probiotics help prevent diarrhea and other intestine problems (including C. diff infection) when you take antibiotics. You can buy these without a prescription in the pharmacy section of the store.  If you were given a prescription for medicine here today, be sure to read all of the information (including the package insert) that comes with your prescription.  This will include important information about the medicine, its side effects, and any warnings that you need to know about.  The pharmacist who fills the prescription can provide more information and answer questions you may have about the medicine.  If you have questions or concerns that the pharmacist cannot address, please call or return to the Emergency Department.     Remember that you can always come back to the Emergency Department if you are not able to see your regular provider in the amount of time listed above, if you get any new symptoms, or if there is anything that worries you.    Discharge Instructions  Hypertension - High Blood Pressure    During you visit to the Emergency Department, your blood pressure was higher than the recommended blood pressure.  This may be related to stress, pain, " medication or other temporary conditions. In these cases, your blood pressure may return to normal on its own. If you have a history of high blood pressure, you may need to have your provider adjust your medications. Sometimes, your high measurement here may indicate that you have developed high blood pressure that will stay high unless it is treated. As a general rule, high blood pressure causes problems over years rather than days, weeks, or months. So, while it is important to treat blood pressure, it is rarely important to treat blood pressure immediately. Occasionally we will begin a medication in the Emergency Department; more often we will recommend close follow-up for medications with a primary doctor/clinic.    Generally, every Emergency Department visit should have a follow-up clinic visit with either a primary or a specialty clinic/provider. Please follow-up as instructed by your emergency provider today.    Return to the Emergency Department if you start to have:  A severe headache.  Chest pain.  Shortness of breath.  Weakness or numbness that affects one part of the body.  Confusion.  Vision changes.  Significant swelling of legs and/or eyes.  A reaction to any medication started in the Emergency Department.    What can I do to help myself?  Avoid alcohol.  Take any blood pressure medicine that you are prescribed.  Get a good night?s sleep.  Lower your salt intake.  Exercise.  Lose weight.  Manage stress.  See your doctor regularly    If blood pressure medication was started in the Emergency Department:  The medicine may not have an immediate effect. The body and brain determine what blood pressure you have. The medicine?s job is to retrain the body?s ?thermostat? to a lower blood pressure.  You will need to follow up with your provider to see how this medicine is working for you.  If you were given a prescription for medicine here today, be sure to read all of the information (including the package  insert) that comes with your prescription.  This will include important information about the medicine, its side effects, and any warnings that you need to know about.  The pharmacist who fills the prescription can provide more information and answer questions you may have about the medicine.  If you have questions or concerns that the pharmacist cannot address, please call or return to the Emergency Department.   Remember that you can always come back to the Emergency Department if you are not able to see your regular provider in the amount of time listed above, if you get any new symptoms, or if there is anything that worries you.

## 2019-02-14 NOTE — ED PROVIDER NOTES
History     Chief Complaint:  Abdominal pain     HPI:   The history is provided by the patient.      Phillip Nassar is a 53 year old male with a history of asthma, SBO, and diverticulitis who presents to the emergency department for evaluation of abdominal pain. The patient reports that he has had three days of left lower quadrant abdominal pain that has been gradually worsening since onset. He does not feel that this came on suddenly and expresses that the pain feels similar to his history of diverticulitis. The pain is sharp and beginning to wrap across his abdomen. It is exacerbated with any movement. He presents to the emergency department with concern for diverticulitis. Here, the patient rates his abdominal pain a 9/10 in severity. He also expresses a decrease in appetite. He denies any bowel or urinary changes, nausea, vomiting, or fever.     Of note, he has had two episode of diverticulitis in the past. The first required hospitalization due to having an SBO along side the infection. The second, 2 years ago, was first treated with PO antibiotics, but he failed outpatient treatment so was admitted for IV antibiotics.     Allergies:  No known drug allergies      Medications:    Albuterol inhaler   Gabapentin   Singulair      Past Medical History:    Diverticulitis   Asthma   Prostate infection   SBO   Cervical radiculopathy     Past Surgical History:    Colonoscopy x 3   Sinus surgery   Knee arthroscopy     Family History:    Father - diabetes mellitus     Social History:  Presents alone    Tobacco use: Never smoker   Alcohol use: No  PCP: Albert Chapman    Marital Status:        Review of Systems   Constitutional: Positive for appetite change. Negative for fever.   Gastrointestinal: Positive for abdominal pain. Negative for constipation, diarrhea, nausea and vomiting.   Genitourinary: Negative for dysuria.   All other systems reviewed and are negative.      Physical Exam     Patient Vitals for  the past 24 hrs:   BP Temp Temp src Pulse Resp SpO2   02/14/19 1100 144/89 -- -- -- -- --   02/14/19 1000 138/88 -- -- 82 -- 94 %   02/14/19 0904 (!) 135/118 98.6  F (37  C) Oral 83 16 94 %        Physical Exam    Constitutional:  Pleasant, age appropriate.       Resting comfortably in the bed.  HEENT:    Oropharynx is moist  Eyes:    Conjunctiva normal  Neck:    Supple, no meningismus.     CV:     Regular rate and rhythm.      No murmurs, rubs or gallops.     No lower extremity edema.  PULM:    Clear to auscultation bilateral.       No respiratory distress.      Good air exchange.  ABD:    Soft, non-distended.       Moderate focal tenderness in the LLQ.     Bowel sounds normal.     No pulsatile masses.       No rebound, guarding or rigidity.     No CVA tenderness.   MSK:     No gross deformity to all four extremities.   LYMPH:   No cervical lymphadenopathy.  NEURO:   Alert.  Good muscular tone, no atrophy.   Skin:    Warm, dry and intact.    Psych:    Mood is good and affect is appropriate.      Emergency Department Course   Imaging:  Radiographic findings were communicated with the patient who voiced understanding of the findings.     CT Abd/pelvis with contrast:  IMPRESSION: CT findings compatible with acute diverticulitis at the  junction of the descending colon and the sigmoid colon. No evidence of  perforation or abscess.    Imaging independently reviewed and agree with radiologist interpretation.      Laboratory:  I personally reviewed the laboratory results with the Patient and answered all related questions prior to discharge.  CBC: WBC 11.1 (H), ow WNL (HGB 15.8, )    BMP: WNL (Creatinine 0.83)   UA with Microscopic: pH 8 (H), Mucous present, ow Negative     Interventions:  1004: Dilaudid 0.5 mg IV   1005: NS 1L IV Bolus    1100: Dilaudid 0.5 mg IV   1101: Cipro 500 mg IV Infusion   1101: Flagyl 500 mg tablet PO      The patient's symptoms were improved with parenteral narcotics.      Emergency  Department Course:  Past medical records, nursing notes, and vitals reviewed.  0924: I performed an exam of the patient and obtained history, as documented above.     IV inserted and blood drawn. This was sent to the lab for further testing, results above.   Above interventions provided.      The patient was sent for a CT while in the emergency department, findings above.        The patient provided a urine sample here in the emergency department. This was sent for laboratory testing, findings above.     1104: I rechecked the patient. Findings and plan explained to the Patient. Patient discharged home with instructions regarding supportive care, medications, and reasons to return. The importance of close follow-up was reviewed.       Impression & Plan      Medical Decision Makin-year-old male with a history of diverticulitis presents to the ED with left lower quadrant pain.  Advanced imaging was undertaken has he had a sudden worsening of pain drawing concern for associated free wall perforation with previous complicated diverticulitis with fistula formation.  CT scan reveals uncomplicated diverticulitis.  Laboratory studies are reassuring.  Patient's symptoms are much improved.  He will be discharged home with analgesics and Cipro/Flagyl as he had previously failed outpatient management with Augmentin.  Patient to return to ED for any worsening symptoms and closely follow-up with PCP.    Diagnosis:    ICD-10-CM    1. Diverticulitis of colon K57.32        Disposition:  Discharged to home with plan as outlined.     Discharge Medications:     Medication List      Started    ciprofloxacin 500 MG tablet  Commonly known as:  CIPRO  500 mg, Oral, 2 TIMES DAILY     metroNIDAZOLE 500 MG tablet  Commonly known as:  FLAGYL  500 mg, Oral, 3 TIMES DAILY     oxyCODONE 5 MG tablet  Commonly known as:  ROXICODONE  5-10 mg, Oral, EVERY 6 HOURS PRN          Scribe Disclosure:   Elvin ESCAMILLA, am serving as a scribe at  9:24 AM on 2/14/2019 to document services personally performed by Flavio Pinedo MD based on my observations and the provider's statements to me.       Flavio Pinedo MD  2/14/2019   [facility] EMERGENCY DEPARTMENT       Flavio Pinedo MD  02/14/19 1427

## 2019-02-14 NOTE — ED AVS SNAPSHOT
[facility] Emergency Department  [address]  [facility] [address]  Phone:  [phone]  Fax:  [phone]                                    Phillip Nassar   MRN: [id number]    Department:  [facility] Emergency Department   Date of Visit:  2/14/2019           After Visit Summary Signature Page    I have received my discharge instructions, and my questions have been answered. I have discussed any challenges I see with this plan with the nurse or doctor.    ..........................................................................................................................................  Patient/Patient Representative Signature      ..........................................................................................................................................  Patient Representative Print Name and Relationship to Patient    ..................................................               ................................................  Date                                   Time    ..........................................................................................................................................  Reviewed by Signature/Title    ...................................................              ..............................................  Date                                               Time          22EPIC Rev 08/18

## 2019-02-14 NOTE — ED TRIAGE NOTES
ABC's intact.  Alert and oriented x4.    Pt c/o LLQ abd pain for he past 3 days.  Pt denies n/v/d.  Pt states this feels similar to previous diverticulitis.

## 2019-09-29 ENCOUNTER — HEALTH MAINTENANCE LETTER (OUTPATIENT)
Age: 54
End: 2019-09-29

## 2019-10-31 ENCOUNTER — MYC MEDICAL ADVICE (OUTPATIENT)
Dept: PEDIATRICS | Facility: CLINIC | Age: 54
End: 2019-10-31

## 2019-10-31 NOTE — TELEPHONE ENCOUNTER
Please review [employer] message from 10/31/19.     Patient is asking for a referral to a dietician to assist with losing weight.     Last office visit with Dr. Chapman was 1/23/19.    Routing to provider to advise.

## 2019-11-04 NOTE — TELEPHONE ENCOUNTER
Usually without a diagnosis of diabetes insurance is hit or miss weather the will cover dietician or not, and we don't have any out-patient dieticians in our system. Recommend he call his insurance to see if covered, and if it is they should have a list of options for him to schedule with.

## 2019-12-10 ENCOUNTER — OFFICE VISIT (OUTPATIENT)
Dept: PEDIATRICS | Facility: CLINIC | Age: 54
End: 2019-12-10
Payer: COMMERCIAL

## 2019-12-10 VITALS
HEIGHT: 68 IN | SYSTOLIC BLOOD PRESSURE: 112 MMHG | OXYGEN SATURATION: 94 % | TEMPERATURE: 98.6 F | BODY MASS INDEX: 33.05 KG/M2 | WEIGHT: 218.1 LBS | HEART RATE: 70 BPM | DIASTOLIC BLOOD PRESSURE: 80 MMHG

## 2019-12-10 DIAGNOSIS — J45.20 MILD INTERMITTENT ASTHMA WITHOUT COMPLICATION: ICD-10-CM

## 2019-12-10 DIAGNOSIS — E66.3 OVERWEIGHT: ICD-10-CM

## 2019-12-10 DIAGNOSIS — M25.562 ARTHRALGIA OF BOTH KNEES: ICD-10-CM

## 2019-12-10 DIAGNOSIS — Z00.00 ROUTINE GENERAL MEDICAL EXAMINATION AT A HEALTH CARE FACILITY: Primary | ICD-10-CM

## 2019-12-10 DIAGNOSIS — M25.561 ARTHRALGIA OF BOTH KNEES: ICD-10-CM

## 2019-12-10 LAB
ALBUMIN SERPL-MCNC: 4.1 G/DL (ref 3.4–5)
ALBUMIN UR-MCNC: NEGATIVE MG/DL
ALP SERPL-CCNC: 66 U/L (ref 40–150)
ALT SERPL W P-5'-P-CCNC: 38 U/L (ref 0–70)
ANION GAP SERPL CALCULATED.3IONS-SCNC: 6 MMOL/L (ref 3–14)
APPEARANCE UR: CLEAR
AST SERPL W P-5'-P-CCNC: 16 U/L (ref 0–45)
BILIRUB SERPL-MCNC: 0.5 MG/DL (ref 0.2–1.3)
BILIRUB UR QL STRIP: NEGATIVE
BUN SERPL-MCNC: 13 MG/DL (ref 7–30)
CALCIUM SERPL-MCNC: 8.8 MG/DL (ref 8.5–10.1)
CHLORIDE SERPL-SCNC: 105 MMOL/L (ref 94–109)
CHOLEST SERPL-MCNC: 200 MG/DL
CO2 SERPL-SCNC: 29 MMOL/L (ref 20–32)
COLOR UR AUTO: YELLOW
CREAT SERPL-MCNC: 0.78 MG/DL (ref 0.66–1.25)
GFR SERPL CREATININE-BSD FRML MDRD: >90 ML/MIN/{1.73_M2}
GLUCOSE SERPL-MCNC: 89 MG/DL (ref 70–99)
GLUCOSE UR STRIP-MCNC: NEGATIVE MG/DL
HDLC SERPL-MCNC: 37 MG/DL
HGB UR QL STRIP: NEGATIVE
KETONES UR STRIP-MCNC: NEGATIVE MG/DL
LDLC SERPL CALC-MCNC: 116 MG/DL
LEUKOCYTE ESTERASE UR QL STRIP: NEGATIVE
NITRATE UR QL: NEGATIVE
NONHDLC SERPL-MCNC: 163 MG/DL
PH UR STRIP: 8 PH (ref 5–7)
POTASSIUM SERPL-SCNC: 4 MMOL/L (ref 3.4–5.3)
PROT SERPL-MCNC: 7 G/DL (ref 6.8–8.8)
PSA SERPL-ACNC: 0.74 UG/L (ref 0–4)
RBC #/AREA URNS AUTO: ABNORMAL /HPF
SODIUM SERPL-SCNC: 140 MMOL/L (ref 133–144)
SOURCE: ABNORMAL
SP GR UR STRIP: 1.02 (ref 1–1.03)
TRIGL SERPL-MCNC: 234 MG/DL
TSH SERPL DL<=0.005 MIU/L-ACNC: 3.32 MU/L (ref 0.4–4)
UROBILINOGEN UR STRIP-ACNC: 0.2 EU/DL (ref 0.2–1)
WBC #/AREA URNS AUTO: ABNORMAL /HPF

## 2019-12-10 PROCEDURE — G0103 PSA SCREENING: HCPCS | Performed by: FAMILY MEDICINE

## 2019-12-10 PROCEDURE — 80053 COMPREHEN METABOLIC PANEL: CPT | Performed by: FAMILY MEDICINE

## 2019-12-10 PROCEDURE — 99386 PREV VISIT NEW AGE 40-64: CPT | Performed by: FAMILY MEDICINE

## 2019-12-10 PROCEDURE — 84443 ASSAY THYROID STIM HORMONE: CPT | Performed by: FAMILY MEDICINE

## 2019-12-10 PROCEDURE — 36415 COLL VENOUS BLD VENIPUNCTURE: CPT | Performed by: FAMILY MEDICINE

## 2019-12-10 PROCEDURE — 86618 LYME DISEASE ANTIBODY: CPT | Performed by: FAMILY MEDICINE

## 2019-12-10 PROCEDURE — 81001 URINALYSIS AUTO W/SCOPE: CPT | Performed by: FAMILY MEDICINE

## 2019-12-10 PROCEDURE — 80061 LIPID PANEL: CPT | Performed by: FAMILY MEDICINE

## 2019-12-10 RX ORDER — ALBUTEROL SULFATE 90 UG/1
2 AEROSOL, METERED RESPIRATORY (INHALATION) EVERY 4 HOURS PRN
Qty: 1 INHALER | Refills: 5 | Status: SHIPPED | OUTPATIENT
Start: 2019-12-10 | End: 2021-09-08

## 2019-12-10 SDOH — HEALTH STABILITY: PHYSICAL HEALTH: ON AVERAGE, HOW MANY MINUTES DO YOU ENGAGE IN EXERCISE AT THIS LEVEL?: 20 MIN

## 2019-12-10 SDOH — ECONOMIC STABILITY: FOOD INSECURITY: WITHIN THE PAST 12 MONTHS, YOU WORRIED THAT YOUR FOOD WOULD RUN OUT BEFORE YOU GOT MONEY TO BUY MORE.: NEVER TRUE

## 2019-12-10 SDOH — HEALTH STABILITY: PHYSICAL HEALTH: ON AVERAGE, HOW MANY DAYS PER WEEK DO YOU ENGAGE IN MODERATE TO STRENUOUS EXERCISE (LIKE A BRISK WALK)?: 1 DAY

## 2019-12-10 SDOH — ECONOMIC STABILITY: TRANSPORTATION INSECURITY
IN THE PAST 12 MONTHS, HAS THE LACK OF TRANSPORTATION KEPT YOU FROM MEDICAL APPOINTMENTS OR FROM GETTING MEDICATIONS?: NO

## 2019-12-10 SDOH — HEALTH STABILITY: MENTAL HEALTH: HOW MANY STANDARD DRINKS CONTAINING ALCOHOL DO YOU HAVE ON A TYPICAL DAY?: 1 OR 2

## 2019-12-10 SDOH — SOCIAL STABILITY: SOCIAL NETWORK: HOW OFTEN DO YOU ATTENT MEETINGS OF THE CLUB OR ORGANIZATION YOU BELONG TO?: NEVER

## 2019-12-10 SDOH — SOCIAL STABILITY: SOCIAL NETWORK: HOW OFTEN DO YOU ATTEND CHURCH OR RELIGIOUS SERVICES?: NEVER

## 2019-12-10 SDOH — ECONOMIC STABILITY: INCOME INSECURITY: HOW HARD IS IT FOR YOU TO PAY FOR THE VERY BASICS LIKE FOOD, HOUSING, MEDICAL CARE, AND HEATING?: NOT HARD AT ALL

## 2019-12-10 SDOH — HEALTH STABILITY: MENTAL HEALTH
STRESS IS WHEN SOMEONE FEELS TENSE, NERVOUS, ANXIOUS, OR CAN'T SLEEP AT NIGHT BECAUSE THEIR MIND IS TROUBLED. HOW STRESSED ARE YOU?: TO SOME EXTENT

## 2019-12-10 SDOH — HEALTH STABILITY: MENTAL HEALTH: HOW OFTEN DO YOU HAVE 6 OR MORE DRINKS ON ONE OCCASION?: NEVER

## 2019-12-10 SDOH — ECONOMIC STABILITY: FOOD INSECURITY: WITHIN THE PAST 12 MONTHS, THE FOOD YOU BOUGHT JUST DIDN'T LAST AND YOU DIDN'T HAVE MONEY TO GET MORE.: NEVER TRUE

## 2019-12-10 SDOH — SOCIAL STABILITY: SOCIAL NETWORK: IN A TYPICAL WEEK, HOW MANY TIMES DO YOU TALK ON THE PHONE WITH FAMILY, FRIENDS, OR NEIGHBORS?: ONCE A WEEK

## 2019-12-10 SDOH — SOCIAL STABILITY: SOCIAL NETWORK
DO YOU BELONG TO ANY CLUBS OR ORGANIZATIONS SUCH AS CHURCH GROUPS UNIONS, FRATERNAL OR ATHLETIC GROUPS, OR SCHOOL GROUPS?: NO

## 2019-12-10 SDOH — SOCIAL STABILITY: SOCIAL NETWORK: ARE YOU MARRIED, WIDOWED, DIVORCED, SEPARATED, NEVER MARRIED, OR LIVING WITH A PARTNER?: MARRIED

## 2019-12-10 SDOH — HEALTH STABILITY: MENTAL HEALTH: HOW OFTEN DO YOU HAVE A DRINK CONTAINING ALCOHOL?: MONTHLY OR LESS

## 2019-12-10 SDOH — SOCIAL STABILITY: SOCIAL NETWORK: HOW OFTEN DO YOU GET TOGETHER WITH FRIENDS OR RELATIVES?: PATIENT DECLINED

## 2019-12-10 SDOH — ECONOMIC STABILITY: TRANSPORTATION INSECURITY
IN THE PAST 12 MONTHS, HAS LACK OF TRANSPORTATION KEPT YOU FROM MEETINGS, WORK, OR FROM GETTING THINGS NEEDED FOR DAILY LIVING?: NO

## 2019-12-10 ASSESSMENT — ENCOUNTER SYMPTOMS
HEMATURIA: 0
FREQUENCY: 0
CONSTIPATION: 0
EYE PAIN: 0
FEVER: 0
ABDOMINAL PAIN: 0
DIZZINESS: 0
CHILLS: 0
NERVOUS/ANXIOUS: 0
HEMATOCHEZIA: 0
DIARRHEA: 0
COUGH: 0

## 2019-12-10 ASSESSMENT — MIFFLIN-ST. JEOR: SCORE: 1818.05

## 2019-12-10 NOTE — PROGRESS NOTES
SUBJECTIVE:   CC: hPillip Nassar is an 54 year old male who presents for preventative health visit.     Healthy Habits:     Getting at least 3 servings of Calcium per day:  Yes    Bi-annual eye exam:  Yes    Dental care twice a year:  Yes    Sleep apnea or symptoms of sleep apnea:  None    Diet:  Regular (no restrictions)    Frequency of exercise:  1 day/week    Duration of exercise:  15-30 minutes    Taking medications regularly:  Yes    Medication side effects:  None    PHQ-2 Total Score: 0    Additional concerns today:  No      Refills on prescriptions - no additional concerns otherwise    Pt also would like to talk about diet today - looking to lose weight    Today's PHQ-2 Score:   PHQ-2 ( 1999 [employer]) 12/10/2019   Q1: Little interest or pleasure in doing things 0   Q2: Feeling down, depressed or hopeless 0   PHQ-2 Score 0   Q1: Little interest or pleasure in doing things Not at all   Q2: Feeling down, depressed or hopeless Not at all   PHQ-2 Score 0       Abuse: Current or Past(Physical, Sexual or Emotional)- No  Do you feel safe in your environment? Yes        Social History     Tobacco Use     Smoking status: Never Smoker     Smokeless tobacco: Never Used   Substance Use Topics     Alcohol use: No     Alcohol/week: 0.0 standard drinks     Frequency: Monthly or less     Drinks per session: 1 or 2     Binge frequency: Never     If you drink alcohol do you typically have >3 drinks per day or >7 drinks per week? No    Alcohol Use 12/10/2019   Prescreen: >3 drinks/day or >7 drinks/week? No   Prescreen: >3 drinks/day or >7 drinks/week? -       SOCIAL HISTORY    .    Occ - MRI tech at [employer]      Last PSA:   PSA   Date Value Ref Range Status   10/21/2009 0.32 0 - 4 ug/L Final       Reviewed orders with patient. Reviewed health maintenance and updated orders accordingly - Yes  Lab work is in process    Reviewed and updated as needed this visit by clinical staff         Reviewed and updated as needed this  "visit by Provider            Review of Systems   Constitutional: Negative for chills and fever.   HENT: Negative for congestion and ear pain.    Eyes: Negative for pain.   Respiratory: Negative for cough.    Cardiovascular: Negative for chest pain.   Gastrointestinal: Negative for abdominal pain, constipation, diarrhea and hematochezia.   Genitourinary: Negative for frequency and hematuria.   Neurological: Negative for dizziness.   Psychiatric/Behavioral: The patient is not nervous/anxious.          OBJECTIVE:   /80 (BP Location: Right arm, Patient Position: Sitting, Cuff Size: Adult Large)   Pulse 70   Temp 98.6  F (37  C) (Oral)   Ht 1.734 m (5' 8.27\")   Wt 98.9 kg (218 lb 1.6 oz)   SpO2 94%   BMI 32.90 kg/m      Physical Exam  GENERAL: healthy, alert and no distress  EYES: Eyes grossly normal to inspection, PERRL and conjunctivae and sclerae normal  HENT: ear canals and TM's normal, nose and mouth without ulcers or lesions  NECK: no adenopathy, no asymmetry, masses, or scars and thyroid normal to palpation  RESP: lungs clear to auscultation - no rales, rhonchi or wheezes  CV: regular rate and rhythm, normal S1 S2, no S3 or S4, no murmur, click or rub, no peripheral edema  ABDOMEN: soft, nontender, no hepatosplenomegaly, no masses    (male): normal male genitalia without lesions or urethral discharge, no hernia  MS: no gross musculoskeletal defects noted, no edema  SKIN: no suspicious lesions or rashes  NEURO: Normal strength and tone, mentation intact and speech normal  PSYCH: mentation appears normal, affect normal/bright    Diagnostic Test Results:  Labs reviewed in Epic    ASSESSMENT/PLAN:   1. Routine general medical examination at a health care facility    - Lipid panel reflex to direct LDL Fasting  - TSH with free T4 reflex  - Comprehensive metabolic panel (BMP + Alb, Alk Phos, ALT, AST, Total. Bili, TP)  - PSA, screen  - UA with Microscopic reflex to Culture    2. Arthralgia of both " "knees    - Lyme Disease Ruthann with reflex to WB Serum    3. Overweight    - NUTRITION REFERRAL    4. Mild intermittent asthma without complication    - albuterol (PROAIR HFA/PROVENTIL HFA/VENTOLIN HFA) 108 (90 Base) MCG/ACT inhaler; Inhale 2 puffs into the lungs every 4 hours as needed for shortness of breath / dyspnea  Dispense: 1 Inhaler; Refill: 5    COUNSELING:   Reviewed preventive health counseling, as reflected in patient instructions       Regular exercise       Healthy diet/nutrition       Vision screening    Estimated body mass index is 32.23 kg/m  as calculated from the following:    Height as of 8/4/17: 1.727 m (5' 8\").    Weight as of 8/4/17: 96.2 kg (212 lb).     Weight management plan: diet consult     reports that he has never smoked. He has never used smokeless tobacco.      Counseling Resources:  ATP IV Guidelines  Pooled Cohorts Equation Calculator  FRAX Risk Assessment  ICSI Preventive Guidelines  Dietary Guidelines for Americans, 2010  USDA's MyPlate  ASA Prophylaxis  Lung CA Screening    David Thompson MD  [facility] [place]  "

## 2019-12-11 LAB — B BURGDOR IGG+IGM SER QL: 0.07 (ref 0–0.89)

## 2019-12-16 ENCOUNTER — TELEPHONE (OUTPATIENT)
Dept: PEDIATRICS | Facility: CLINIC | Age: 54
End: 2019-12-16

## 2019-12-16 NOTE — TELEPHONE ENCOUNTER
Nutrition Education Scheduling Outreach #1:    Call to patient to schedule. Left message with phone number to call to schedule.    Plan for 2nd outreach attempt within 1 week.    Gabo Woody OnCall  Diabetes and Nutrition Scheduling

## 2020-12-28 NOTE — PROGRESS NOTES
"Subjective     Phillip Nassar is a 55 year old male who presents to clinic today for the following health issues:    HPI         Abdominal/Flank Pain  Onset/Duration: Diverticulitis Flare for the past couple days   Description:   Character: Sharp and Dull ache  Location: Lower left abdominal pain   Radiation: Can radiate across abdomen    Intensity: moderate  Progression of Symptoms:  same and constant  Accompanying Signs & Symptoms:  Fever/Chills: no  Gas/Bloating: YES- both   Nausea: no  Vomitting: no  Diarrhea: YES- but does have a history of loose stools   Constipation: no  Dysuria or Hematuria: no  History:   Trauma: no  Previous similar pain: YES- diverticulitis in the past   Previous tests done: Scans   Precipitating factors:   Does the pain change with:     Food: no    Bowel Movement: YES- makes pain better     Urination: YES- makes pressure/pain better    Other factors:  no  Therapies tried and outcome: None    Phillip Nassar is a 55 year old male who presents today for new onset llq pain  He does tend to get this regularly around this time of year  CT showed diverticulitis in 02/2019; needed hospitalization  Has hx of SBO as well  No vomiting  Stools are looser overall since his initial event  No fevers, chills  BMs do seem more frequent the past few weeks; normal consistency   Has used some ibuprofen; minimally effective      Review of Systems   Constitutional, HEENT, cardiovascular, pulmonary, gi and gu systems are negative, except as otherwise noted.      Objective    /78   Pulse 81   Temp 97.3  F (36.3  C) (Tympanic)   Resp 16   Ht 1.727 m (5' 8\")   Wt 99.4 kg (219 lb 3.2 oz)   SpO2 96%   BMI 33.33 kg/m    Body mass index is 33.33 kg/m .  Physical Exam   GENERAL: healthy, alert and no distress  RESP: lungs clear to auscultation - no rales, rhonchi or wheezes  CV: regular rates and rhythm  ABDOMEN: tenderness LLQ and lower midline, bowel sounds normal and no palpable or pulsatile " "masses   (male): not examined  SKIN: no suspicious lesions or rashes  PSYCH: mentation appears normal, affect normal/bright          Assessment & Plan     Diverticulitis of colon  Phillip has a hx of both diverticulitis and SBO. He is usually well aware of the early symptoms of his flares but has not had one for some time. As this is classic for him and there are no red flags to suggest he needs more acute care we'll start empiric course of abx. Historically augmentin has seemed less effective so we'll start with below. With worsening he'll need to be seen more urgently. He is agreeable to the care plan  - metroNIDAZOLE (FLAGYL) 500 MG tablet; Take 1 tablet (500 mg) by mouth 3 times daily for 10 days  - ciprofloxacin (CIPRO) 500 MG tablet; Take 1 tablet (500 mg) by mouth 2 times daily     BMI:   Estimated body mass index is 33.33 kg/m  as calculated from the following:    Height as of this encounter: 1.727 m (5' 8\").    Weight as of this encounter: 99.4 kg (219 lb 3.2 oz).          Return in about 2 years (around 12/29/2022) for recheck if symptoms are not improving..    Lan Hsu PA-C  [facility]    "

## 2020-12-29 ENCOUNTER — OFFICE VISIT (OUTPATIENT)
Dept: FAMILY MEDICINE | Facility: CLINIC | Age: 55
End: 2020-12-29
Payer: COMMERCIAL

## 2020-12-29 VITALS
TEMPERATURE: 97.3 F | RESPIRATION RATE: 16 BRPM | HEIGHT: 68 IN | HEART RATE: 81 BPM | OXYGEN SATURATION: 96 % | SYSTOLIC BLOOD PRESSURE: 121 MMHG | DIASTOLIC BLOOD PRESSURE: 78 MMHG | BODY MASS INDEX: 33.22 KG/M2 | WEIGHT: 219.2 LBS

## 2020-12-29 DIAGNOSIS — K57.32 DIVERTICULITIS OF COLON: Primary | ICD-10-CM

## 2020-12-29 PROCEDURE — 99213 OFFICE O/P EST LOW 20 MIN: CPT | Performed by: PHYSICIAN ASSISTANT

## 2020-12-29 RX ORDER — CIPROFLOXACIN 500 MG/1
500 TABLET, FILM COATED ORAL 2 TIMES DAILY
Qty: 20 TABLET | Refills: 0 | Status: SHIPPED | OUTPATIENT
Start: 2020-12-29 | End: 2021-07-14

## 2020-12-29 RX ORDER — METRONIDAZOLE 500 MG/1
500 TABLET ORAL 3 TIMES DAILY
Qty: 30 TABLET | Refills: 0 | Status: SHIPPED | OUTPATIENT
Start: 2020-12-29 | End: 2021-01-08

## 2020-12-29 ASSESSMENT — MIFFLIN-ST. JEOR: SCORE: 1813.78

## 2020-12-29 NOTE — LETTER
"My Asthma Action Plan    Name: Phillip Nassar   YOB: 1965  Date: 12/28/2020   My doctor: Lan Hsu PA-C   [facility]        My Rescue Medicine:   Albuterol inhaler (Proair/Ventolin/Proventil HFA)  2-4 puffs EVERY 4 HOURS as needed. Use a spacer if recommended by your provider.   My Asthma Severity:   { :073222::\"Intermittent / Exercise Induced\"}  Know your asthma triggers: { :478058}             GREEN ZONE   Good Control    I feel good    No cough or wheeze    Can work, sleep and play without asthma symptoms       Take your asthma control medicine every day.     1. If exercise triggers your asthma, take your rescue medication    15 minutes before exercise or sports, and    During exercise if you have asthma symptoms  2. Spacer to use with inhaler: If you have a spacer, make sure to use it with your inhaler             YELLOW ZONE Getting Worse  I have ANY of these:    I do not feel good    Cough or wheeze    Chest feels tight    Wake up at night   1. Keep taking your Green Zone medications  2. Start taking your rescue medicine:    every 20 minutes for up to 1 hour. Then every 4 hours for 24-48 hours.  3. If you stay in the Yellow Zone for more than 12-24 hours, contact your doctor.  4. If you do not return to the Green Zone in 12-24 hours or you get worse, start taking your oral steroid medicine if prescribed by your provider.           RED ZONE Medical Alert - Get Help  I have ANY of these:    I feel awful    Medicine is not helping    Breathing getting harder    Trouble walking or talking    Nose opens wide to breathe       1. Take your rescue medicine NOW  2. If your provider has prescribed an oral steroid medicine, start taking it NOW  3. Call your doctor NOW  4. If you are still in the Red Zone after 20 minutes and you have not reached your doctor:    Take your rescue medicine again and    Call 911 or go to the emergency room right away    See your " regular doctor within 2 weeks of an Emergency Room or Urgent Care visit for follow-up treatment.          Annual Reminders:  Meet with Asthma Educator,  Flu Shot in the Fall, consider Pneumonia Vaccination for patients with asthma (aged 19 and older).    Pharmacy:    [id number] - [place], [place] - [address] AT [facility]    Electronically signed by Lan Hsu PA-C   Date: 12/28/20                    Asthma Triggers  How To Control Things That Make Your Asthma Worse    Triggers are things that make your asthma worse.  Look at the list below to help you find your triggers and   what you can do about them. You can help prevent asthma flare-ups by staying away from your triggers.      Trigger                                                          What you can do   Cigarette Smoke  Tobacco smoke can make asthma worse. Do not allow smoking in your home, car or around you.  Be sure no one smokes at a child s day care or school.  If you smoke, ask your health care provider for ways to help you quit.  Ask family members to quit too.  Ask your health care provider for a referral to Quit Plan to help you quit smoking, or call 6-713-480-PLAN.     Colds, Flu, Bronchitis  These are common triggers of asthma. Wash your hands often.  Don t touch your eyes, nose or mouth.  Get a flu shot every year.     Dust Mites  These are tiny bugs that live in cloth or carpet. They are too small to see. Wash sheets and blankets in hot water every week.   Encase pillows and mattress in dust mite proof covers.  Avoid having carpet if you can. If you have carpet, vacuum weekly.   Use a dust mask and HEPA vacuum.   Pollen and Outdoor Mold  Some people are allergic to trees, grass, or weed pollen, or molds. Try to keep your windows closed.  Limit time out doors when pollen count is high.   Ask you health care provider about taking medicine during allergy season.     Animal  Dander  Some people are allergic to skin flakes, urine or saliva from pets with fur or feathers. Keep pets with fur or feathers out of your home.    If you can t keep the pet outdoors, then keep the pet out of your bedroom.  Keep the bedroom door closed.  Keep pets off cloth furniture and away from stuffed toys.     Mice, Rats, and Cockroaches  Some people are allergic to the waste from these pests.   Cover food and garbage.  Clean up spills and food crumbs.  Store grease in the refrigerator.   Keep food out of the bedroom.   Indoor Mold  This can be a trigger if your home has high moisture. Fix leaking faucets, pipes, or other sources of water.   Clean moldy surfaces.  Dehumidify basement if it is damp and smelly.   Smoke, Strong Odors, and Sprays  These can reduce air quality. Stay away from strong odors and sprays, such as perfume, powder, hair spray, paints, smoke incense, paint, cleaning products, candles and new carpet.   Exercise or Sports  Some people with asthma have this trigger. Be active!  Ask your doctor about taking medicine before sports or exercise to prevent symptoms.    Warm up for 5-10 minutes before and after sports or exercise.     Other Triggers of Asthma  Cold air:  Cover your nose and mouth with a scarf.  Sometimes laughing or crying can be a trigger.  Some medicines and food can trigger asthma.

## 2020-12-30 ASSESSMENT — ASTHMA QUESTIONNAIRES: ACT_TOTALSCORE: 25

## 2021-01-14 ENCOUNTER — HEALTH MAINTENANCE LETTER (OUTPATIENT)
Age: 56
End: 2021-01-14

## 2021-01-31 NOTE — NURSING NOTE
"Chief Complaint   Patient presents with     Neck Pain       Initial /72 (BP Location: Right arm, Patient Position: Sitting, Cuff Size: Adult Large)  Ht 5' 8\" (1.727 m)  Wt 212 lb (96.2 kg)  BMI 32.23 kg/m2 Estimated body mass index is 32.23 kg/(m^2) as calculated from the following:    Height as of this encounter: 5' 8\" (1.727 m).    Weight as of this encounter: 212 lb (96.2 kg).  Medication Reconciliation: complete     Sis Cabello, ATC      " 0

## 2021-05-19 ENCOUNTER — OFFICE VISIT - HEALTHEAST (OUTPATIENT)
Dept: PODIATRY | Facility: CLINIC | Age: 56
End: 2021-05-19

## 2021-05-19 DIAGNOSIS — M72.2 PLANTAR FASCIITIS: ICD-10-CM

## 2021-05-19 RX ORDER — ALBUTEROL SULFATE 90 UG/1
2 AEROSOL, METERED RESPIRATORY (INHALATION)
Status: SHIPPED | COMMUNITY
Start: 2019-12-10 | End: 2022-12-19

## 2021-05-27 VITALS — HEART RATE: 76 BPM | TEMPERATURE: 98.5 F | SYSTOLIC BLOOD PRESSURE: 130 MMHG | DIASTOLIC BLOOD PRESSURE: 88 MMHG

## 2021-06-16 ENCOUNTER — OFFICE VISIT - HEALTHEAST (OUTPATIENT)
Dept: PODIATRY | Facility: CLINIC | Age: 56
End: 2021-06-16

## 2021-06-16 ENCOUNTER — TRANSFERRED RECORDS (OUTPATIENT)
Dept: HEALTH INFORMATION MANAGEMENT | Facility: CLINIC | Age: 56
End: 2021-06-16

## 2021-06-16 DIAGNOSIS — M72.2 PLANTAR FASCIITIS: ICD-10-CM

## 2021-06-16 ASSESSMENT — MIFFLIN-ST. JEOR: SCORE: 1810.61

## 2021-06-17 NOTE — PROGRESS NOTES
FOOT AND ANKLE SURGERY/PODIATRY CONSULT NOTE         ASSESSMENT:   Plantar fasciitis left foot      TREATMENT:  The patient was given a cortisone injection in the left heel today consisting of 1 cc of dexamethasone sodium phosphate and 1 cc of 2% lidocaine plain.  He was started on ibuprofen 600 mg 1 tab 3 times daily.  I have also recommended orthotics.  I have asked the patient to return to the clinic if his pain persist at which time I would recommend a second cortisone injection.        HPI: Phillip Nassar presented to the clinic today complaining of pain in the bottom of his left heel.  Patient indicated that he has had this heel pain for approximately 3 to 4 weeks.  The pain is aggravated with prolonged weightbearing and ambulation.  It is an aching type pain which is relieved with nonweightbearing.  The pain is more pronounced when he first gets out of bed in the mornings.  He has post static dyskinesia.  He denies any trauma to the foot.  He has not had any associated redness or swelling.  He has tried over-the-counter shoe inserts which gave him temporary relief.      History reviewed. No pertinent past medical history.    History reviewed. No pertinent surgical history.    No Known Allergies      Current Outpatient Medications:      albuterol (PROAIR HFA;PROVENTIL HFA;VENTOLIN HFA) 90 mcg/actuation inhaler, Inhale 2 puffs., Disp: , Rfl:      cetirizine 10 mg cap, Take by mouth., Disp: , Rfl:     History reviewed. No pertinent family history.    Social History     Socioeconomic History     Marital status:      Spouse name: Not on file     Number of children: Not on file     Years of education: Not on file     Highest education level: Not on file   Occupational History     Not on file   Social Needs     Financial resource strain: Not on file     Food insecurity     Worry: Not on file     Inability: Not on file     Transportation needs     Medical: Not on file     Non-medical: Not on file   Tobacco  Use     Smoking status: Never Smoker     Smokeless tobacco: Never Used   Substance and Sexual Activity     Alcohol use: Not on file     Drug use: Not on file     Sexual activity: Not on file   Lifestyle     Physical activity     Days per week: Not on file     Minutes per session: Not on file     Stress: Not on file   Relationships     Social connections     Talks on phone: Not on file     Gets together: Not on file     Attends Buddhism service: Not on file     Active member of club or organization: Not on file     Attends meetings of clubs or organizations: Not on file     Relationship status: Not on file     Intimate partner violence     Fear of current or ex partner: Not on file     Emotionally abused: Not on file     Physically abused: Not on file     Forced sexual activity: Not on file   Other Topics Concern     Not on file   Social History Narrative     Not on file       Review of Systems - Patient denies fever, chills, rash, wound, stiffness, limping, numbness, weakness, heart burn, blood in stool, chest pain with activity, calf pain when walking, shortness of breath with activity, chronic cough, easy bleeding/bruising, swelling of ankles, excessive thirst, fatigue, depression, anxiety.  Patient admits to left heel pain.      OBJECTIVE:  Appearance: alert, well appearing, and in no distress.    Vitals:    05/19/21 1446   BP: 130/88   Pulse: 76   Temp: 98.5  F (36.9  C)       BMI= There is no height or weight on file to calculate BMI.    General appearance: Patient is alert and fully cooperative with history & exam.  No sign of distress is noted during the visit.  Psychiatric: Affect is pleasant & appropriate.  Patient appears motivated to improve health.  Respiratory: Breathing is regular & unlabored while sitting.  HEENT: Hearing is intact to spoken word.  Speech is clear.  No gross evidence of visual impairment that would impact ambulation.    Vascular: Dorsalis pedis and posterior tibial pulses are  palpable. There is pedal hair growth bilaterally.  CFT < 3 sec from anterior tibial surface to distal digits bilaterally. There is no appreciable edema noted.  Dermatologic: Turgor and texture are within normal limits. No coloration or temperature changes. No primary or secondary lesions noted.  Neurologic: All epicritic and proprioceptive sensations are grossly intact bilaterally.  Musculoskeletal: All active and passive ankle, subtalar, midtarsal, and 1st MPJ range of motion are grossly intact without pain or crepitus, with the exception of none. Manual muscle strength is within normal limits bilaterally. All dorsiflexors, plantarflexors, invertors, evertors are intact bilaterally. Tenderness present to the plantar medial aspect of the left heel on palpation.  No tenderness to left foot or ankle with range of motion. Calf is soft/non-tender without warmth/induration    Imaging:   No results found.             Randolph March; LEONEL  [facility] Foot & Ankle Surgery/Podiatry

## 2021-06-17 NOTE — PATIENT INSTRUCTIONS - HE
Please call one of the [place] locations below to schedule an appointment. If you received a prescription please bring it with you to your appointment. Some locations are limited to what they carry.    Office Locations    [facility]  [address] [place] [address]  Home Medical Equipment, [address]   Phone: [phone]   Orthotics and Prosthetics, [address]   Phone: [phone]    [facility]  Home Medical Equipment  [address], [address], [place] [address]   Phone: [phone]    Orthotics and Prosthetics ([facility])    [address], [address], [place] [address]  Phone: [phone]    [facility]  [address]   [place] [address]   Phone: [phone]    [facility]  [address]  [place] [address]  Phone: [phone]    [facility]   [address]  [place] [address]  Phone: [phone]    [facility]  [address]  [place] [address]  Phone: [phone]    [facility]  [address]  [address]  [place] [address]  Phone: [phone]    [place]   [address] [place] [address]   Phone: [phone]      What are Prescription Custom Orthotics?  Custom orthotics are specially-made devices designed to support and comfort your feet. Prescription orthotics are crafted for you and no one else. They match the contours of your feet precisely and are designed for the way you move. Orthotics are only manufactured after a podiatrist has conducted a complete evaluation of your feet, ankles, and legs, so the orthotic can accommodate your unique foot structure and pathology.  Prescription orthotics are divided into two  categories:    Functional orthotics are designed to control abnormal motion. They may be used to treat foot pain caused by abnormal motion; they can also be used to treat injuries such as shin splints or tendinitis. Functional orthotics are usually crafted of a semi-rigid material such as plastic or graphite.    Accommodative orthotics are softer and meant to provide additional cushioning and support. They can be used to treat diabetic foot ulcers, painful calluses on the bottom of the foot, and other uncomfortable conditions.  Podiatrists use orthotics to treat foot problems such as plantar fasciitis, bursitis, tendinitis, diabetic foot ulcers, and foot, ankle, and heel pain. Clinical research studies have shown that podiatrist-prescribed foot orthotics decrease foot pain and improve function.  Orthotics typically cost more than shoe inserts purchased in a retail store, but the additional cost is usually well worth it. Unlike shoe inserts, orthotics are molded to fit each individual foot, so you can be sure that your orthotics fit and do what they're supposed to do. Prescription orthotics are also made of top-notch materials and last many years when cared for properly. Insurance often helps pay for prescription orthotics.  What are Shoe Inserts?   You've seen them at the grocery store and at the mall. You've probably even seen them on TV and online. Shoe inserts are any kind of non-prescription foot support designed to be worn inside a shoe. Pre-packaged, mass produced, arch supports are shoe inserts. So are the  custom-made  insoles and foot supports that you can order online or at retail stores. Unless the device has been prescribed by a doctor and crafted for your specific foot, it's a shoe insert, not a custom orthotic device--despite what the ads might say.  Shoe inserts can be very helpful for a variety of foot ailments, including flat arches and foot and leg pain. They can cushion your feet, provide comfort,  and support your arches, but they can't correct biomechanical foot problems or cure long-standing foot issues.  The most common types of shoe inserts are:    Arch supports: Some people have high arches. Others have low arches or flat feet. Arch supports generally have a  bumped-up  appearance and are designed to support the foot's natural arch.     Insoles: Insoles slip into your shoe to provide extra cushioning and support. Insoles are often made of gel, foam, or plastic.     Heel liners: Heel liners, sometimes called heel pads or heel cups, provide extra cushioning in the heel region. They may be especially useful for patients who have foot pain caused by age-related thinning of the heels' natural fat pads.     Foot cushions: Do your shoes rub against your heel or your toes? Foot cushions come in many different shapes and sizes and can be used as a barrier between you and your shoe.  Choosing an Over-the-Counter Shoe Insert  Selecting a shoe insert from the wide variety of devices on the market can be overwhelming. Here are some podiatrist-tested tips to help you find the insert that best meets your needs:    Consider your health. Do you have diabetes? Problems with circulation? An over-the-counter insert may not be your best bet. Diabetes and poor circulation increase your risk of foot ulcers and infections, so schedule an appointment with a podiatrist. He or she can help you select a solution that won't cause additional health problems.     Think about the purpose. Are you planning to run a marathon, or do you just need a little arch support in your work shoes? Look for a product that fits your planned level of activity.     Bring your shoes. For the insert to be effective, it has to fit into your shoes. So bring your sneakers, dress shoes, or work boots--whatever you plan to wear with your insert. Look for an insert that will fit the contours of your shoe.     Try them on. If all possible, slip the insert into  your shoe and try it out. Walk around a little. How does it feel? Don't assume that feelings of pressure will go away with continued wear. (If you can't try the inserts at the store, ask about the store's return policy and hold on to your receipt.)

## 2021-06-26 NOTE — PROGRESS NOTES
FOOT AND ANKLE SURGERY/PODIATRY Progress Note        ASSESSMENT:   Plantar fasciitis left foot    TREATMENT:  The patient was given a second cortisone injection in the left heel today consisting of 1 cc of dexamethasone sodium phosphate and 1 cc of 2% lidocaine plain.   I have asked the patient to return to the clinic if his pain persist at which time I would recommend an x-ray of his left foot.            HPI: Phillip Nassar presented to the clinic today complaining of pain in the bottom of his left heel.  The patient was given a cortisone injection during his last visit.  He indicated that he had temporary relief. Patient indicated that he has had this heel pain for approximately 3 to 4 weeks.  The pain is aggravated with prolonged weightbearing and ambulation.  It is an aching type pain which is relieved with nonweightbearing.  The pain is more pronounced when he first gets out of bed in the mornings.    She has post static dyskinesia.    She denies any trauma to the foot.    He  has not had any associated redness or swelling.    He has tried over-the-counter shoe inserts which gave him temporary relief.     No past medical history on file.    No past surgical history on file.    No Known Allergies      Current Outpatient Medications:      albuterol (PROAIR HFA;PROVENTIL HFA;VENTOLIN HFA) 90 mcg/actuation inhaler, Inhale 2 puffs., Disp: , Rfl:      cetirizine 10 mg cap, Take by mouth., Disp: , Rfl:     Current Facility-Administered Medications:      dexAMETHasone injection 4 mg (DECADRON), 4 mg, Intramuscular, Once, JoaquimTexh G., DPM     dexAMETHasone injection 4 mg (DECADRON), 4 mg, Intravenous, Once, Randolph March., DPM     lidocaine 20 mg/mL (2 %) injection 1 mL, 1 mL, Other, Once, Randolph March G., DPM     lidocaine 20 mg/mL (2 %) injection 1 mL, 1 mL, Other, Once, Randolph March., DPM    No family history on file.    Social History     Socioeconomic History     Marital status:       Spouse name: Not on file     Number of children: Not on file     Years of education: Not on file     Highest education level: Not on file   Occupational History     Not on file   Social Needs     Financial resource strain: Not on file     Food insecurity     Worry: Not on file     Inability: Not on file     Transportation needs     Medical: Not on file     Non-medical: Not on file   Tobacco Use     Smoking status: Never Smoker     Smokeless tobacco: Never Used   Substance and Sexual Activity     Alcohol use: Not on file     Drug use: Not on file     Sexual activity: Not on file   Lifestyle     Physical activity     Days per week: Not on file     Minutes per session: Not on file     Stress: Not on file   Relationships     Social connections     Talks on phone: Not on file     Gets together: Not on file     Attends Synagogue service: Not on file     Active member of club or organization: Not on file     Attends meetings of clubs or organizations: Not on file     Relationship status: Not on file     Intimate partner violence     Fear of current or ex partner: Not on file     Emotionally abused: Not on file     Physically abused: Not on file     Forced sexual activity: Not on file   Other Topics Concern     Not on file   Social History Narrative     Not on file       10 point Review of Systems is negative except .     There were no vitals filed for this visit.    BMI= There is no height or weight on file to calculate BMI.    OBJECTIVE:  General appearance: Patient is alert and fully cooperative with history & exam.  No sign of distress is noted during the visit.  Vascular: Dorsalis pedis and posterior tibial pulses are palpable. There is pedal hair growth bilaterally.  CFT < 3 sec from anterior tibial surface to distal digits bilaterally. There is no appreciable edema noted.  Dermatologic: Turgor and texture are within normal limits. No coloration or temperature changes. No primary or secondary lesions  noted.  Neurologic: All epicritic and proprioceptive sensations are grossly intact bilaterally.  Musculoskeletal: All active and passive ankle, subtalar, midtarsal, and 1st MPJ range of motion are grossly intact without pain or crepitus, with the exception of none. Manual muscle strength is within normal limits bilaterally. All dorsiflexors, plantarflexors, invertors, evertors are intact bilaterally. Tenderness present to the plantar medial aspect of the left heel on palpation.  No tenderness to left foot or ankle with range of motion. Calf is soft/non-tender without warmth/induration        Imaging:         No results found.         Randolph March; LEONEL  [facility] Foot & Ankle Surgery/Podiatry

## 2021-07-11 ASSESSMENT — ENCOUNTER SYMPTOMS
FEVER: 0
PARESTHESIAS: 0
SHORTNESS OF BREATH: 0
ARTHRALGIAS: 1
WEAKNESS: 0
PALPITATIONS: 0
NERVOUS/ANXIOUS: 0
NAUSEA: 0
CHILLS: 0
DIZZINESS: 0
FREQUENCY: 1
MYALGIAS: 1
DIARRHEA: 0
HEARTBURN: 0
EYE PAIN: 0
HEMATURIA: 0
ABDOMINAL PAIN: 0
SORE THROAT: 0
HEADACHES: 0
HEMATOCHEZIA: 0
COUGH: 0
CONSTIPATION: 0
DYSURIA: 0
JOINT SWELLING: 1

## 2021-07-14 ENCOUNTER — OFFICE VISIT (OUTPATIENT)
Dept: PEDIATRICS | Facility: CLINIC | Age: 56
End: 2021-07-14
Payer: COMMERCIAL

## 2021-07-14 VITALS
HEART RATE: 92 BPM | DIASTOLIC BLOOD PRESSURE: 74 MMHG | HEIGHT: 68 IN | TEMPERATURE: 98.7 F | WEIGHT: 216.9 LBS | RESPIRATION RATE: 20 BRPM | BODY MASS INDEX: 32.87 KG/M2 | SYSTOLIC BLOOD PRESSURE: 122 MMHG

## 2021-07-14 DIAGNOSIS — Z11.59 NEED FOR HEPATITIS C SCREENING TEST: ICD-10-CM

## 2021-07-14 DIAGNOSIS — Z13.6 CARDIOVASCULAR SCREENING; LDL GOAL LESS THAN 160: ICD-10-CM

## 2021-07-14 DIAGNOSIS — M79.662 PAIN OF LEFT LOWER LEG: ICD-10-CM

## 2021-07-14 DIAGNOSIS — N52.9 ERECTILE DYSFUNCTION, UNSPECIFIED ERECTILE DYSFUNCTION TYPE: ICD-10-CM

## 2021-07-14 DIAGNOSIS — Z00.00 ROUTINE GENERAL MEDICAL EXAMINATION AT A HEALTH CARE FACILITY: Primary | ICD-10-CM

## 2021-07-14 DIAGNOSIS — J45.20 MILD INTERMITTENT ASTHMA WITHOUT COMPLICATION: ICD-10-CM

## 2021-07-14 DIAGNOSIS — Z11.4 SCREENING FOR HIV (HUMAN IMMUNODEFICIENCY VIRUS): ICD-10-CM

## 2021-07-14 DIAGNOSIS — M72.2 PLANTAR FASCIITIS: ICD-10-CM

## 2021-07-14 DIAGNOSIS — Z12.5 PROSTATE CANCER SCREENING: ICD-10-CM

## 2021-07-14 PROCEDURE — 90471 IMMUNIZATION ADMIN: CPT | Performed by: INTERNAL MEDICINE

## 2021-07-14 PROCEDURE — 80053 COMPREHEN METABOLIC PANEL: CPT | Performed by: INTERNAL MEDICINE

## 2021-07-14 PROCEDURE — 80061 LIPID PANEL: CPT | Performed by: INTERNAL MEDICINE

## 2021-07-14 PROCEDURE — G0103 PSA SCREENING: HCPCS | Performed by: INTERNAL MEDICINE

## 2021-07-14 PROCEDURE — 99213 OFFICE O/P EST LOW 20 MIN: CPT | Mod: 25 | Performed by: INTERNAL MEDICINE

## 2021-07-14 PROCEDURE — 86803 HEPATITIS C AB TEST: CPT | Performed by: INTERNAL MEDICINE

## 2021-07-14 PROCEDURE — 90715 TDAP VACCINE 7 YRS/> IM: CPT | Performed by: INTERNAL MEDICINE

## 2021-07-14 PROCEDURE — 36415 COLL VENOUS BLD VENIPUNCTURE: CPT | Performed by: INTERNAL MEDICINE

## 2021-07-14 PROCEDURE — 87389 HIV-1 AG W/HIV-1&-2 AB AG IA: CPT | Performed by: INTERNAL MEDICINE

## 2021-07-14 PROCEDURE — 99396 PREV VISIT EST AGE 40-64: CPT | Mod: 25 | Performed by: INTERNAL MEDICINE

## 2021-07-14 RX ORDER — SILDENAFIL 100 MG/1
TABLET, FILM COATED ORAL
Qty: 4 TABLET | Refills: 4 | Status: SHIPPED | OUTPATIENT
Start: 2021-07-14 | End: 2022-12-19

## 2021-07-14 ASSESSMENT — ENCOUNTER SYMPTOMS
SHORTNESS OF BREATH: 0
SORE THROAT: 0
HEADACHES: 0
HEMATOCHEZIA: 0
CONSTIPATION: 0
NERVOUS/ANXIOUS: 0
FEVER: 0
ARTHRALGIAS: 1
HEMATURIA: 0
EYE PAIN: 0
CHILLS: 0
MYALGIAS: 1
DIZZINESS: 0
PALPITATIONS: 0
JOINT SWELLING: 1
DIARRHEA: 0
DYSURIA: 0
HEARTBURN: 0
WEAKNESS: 0
FREQUENCY: 1
PARESTHESIAS: 0
NAUSEA: 0
COUGH: 0
ABDOMINAL PAIN: 0

## 2021-07-14 ASSESSMENT — MIFFLIN-ST. JEOR: SCORE: 1798.86

## 2021-07-14 NOTE — LETTER
My Asthma Action Plan    Name: Phillip Nassar   YOB: 1965  Date: 7/14/2021   My doctor: Albert Chapman MD   My clinic: [facility]        My Rescue Medicine:   Albuterol inhaler (Proair/Ventolin/Proventil HFA)  2-4 puffs EVERY 4 HOURS as needed. Use a spacer if recommended by your provider.   My Asthma Severity:   Intermittent / Exercise Induced  Know your asthma triggers: exercise or sports, cold air and URI             GREEN ZONE   Good Control    I feel good    No cough or wheeze    Can work, sleep and play without asthma symptoms       Take your asthma control medicine every day.     1. If exercise triggers your asthma, take your rescue medication    15 minutes before exercise or sports, and    During exercise if you have asthma symptoms  2. Spacer to use with inhaler: If you have a spacer, make sure to use it with your inhaler             YELLOW ZONE Getting Worse  I have ANY of these:    I do not feel good    Cough or wheeze    Chest feels tight    Wake up at night   1. Keep taking your Green Zone medications  2. Start taking your rescue medicine:    every 20 minutes for up to 1 hour. Then every 4 hours for 24-48 hours.  3. If you stay in the Yellow Zone for more than 12-24 hours, contact your doctor.  4. If you do not return to the Green Zone in 12-24 hours or you get worse, start taking your oral steroid medicine if prescribed by your provider.           RED ZONE Medical Alert - Get Help  I have ANY of these:    I feel awful    Medicine is not helping    Breathing getting harder    Trouble walking or talking    Nose opens wide to breathe       1. Take your rescue medicine NOW  2. If your provider has prescribed an oral steroid medicine, start taking it NOW  3. Call your doctor NOW  4. If you are still in the Red Zone after 20 minutes and you have not reached your doctor:    Take your rescue medicine again and    Call 911 or go to the emergency room right away    See  your regular doctor within 2 weeks of an Emergency Room or Urgent Care visit for follow-up treatment.          Annual Reminders:  Meet with Asthma Educator,  Flu Shot in the Fall, consider Pneumonia Vaccination for patients with asthma (aged 19 and older).    Pharmacy:    [id number] - [place], [place] - [address] AT [facility]    Electronically signed by Albert Chapman MD   Date: 07/14/21                    Asthma Triggers  How To Control Things That Make Your Asthma Worse    Triggers are things that make your asthma worse.  Look at the list below to help you find your triggers and   what you can do about them. You can help prevent asthma flare-ups by staying away from your triggers.      Trigger                                                          What you can do   Cigarette Smoke  Tobacco smoke can make asthma worse. Do not allow smoking in your home, car or around you.  Be sure no one smokes at a child s day care or school.  If you smoke, ask your health care provider for ways to help you quit.  Ask family members to quit too.  Ask your health care provider for a referral to Quit Plan to help you quit smoking, or call 1-245-548-PLAN.     Colds, Flu, Bronchitis  These are common triggers of asthma. Wash your hands often.  Don t touch your eyes, nose or mouth.  Get a flu shot every year.     Dust Mites  These are tiny bugs that live in cloth or carpet. They are too small to see. Wash sheets and blankets in hot water every week.   Encase pillows and mattress in dust mite proof covers.  Avoid having carpet if you can. If you have carpet, vacuum weekly.   Use a dust mask and HEPA vacuum.   Pollen and Outdoor Mold  Some people are allergic to trees, grass, or weed pollen, or molds. Try to keep your windows closed.  Limit time out doors when pollen count is high.   Ask you health care provider about taking medicine during allergy season.     Animal  Dander  Some people are allergic to skin flakes, urine or saliva from pets with fur or feathers. Keep pets with fur or feathers out of your home.    If you can t keep the pet outdoors, then keep the pet out of your bedroom.  Keep the bedroom door closed.  Keep pets off cloth furniture and away from stuffed toys.     Mice, Rats, and Cockroaches  Some people are allergic to the waste from these pests.   Cover food and garbage.  Clean up spills and food crumbs.  Store grease in the refrigerator.   Keep food out of the bedroom.   Indoor Mold  This can be a trigger if your home has high moisture. Fix leaking faucets, pipes, or other sources of water.   Clean moldy surfaces.  Dehumidify basement if it is damp and smelly.   Smoke, Strong Odors, and Sprays  These can reduce air quality. Stay away from strong odors and sprays, such as perfume, powder, hair spray, paints, smoke incense, paint, cleaning products, candles and new carpet.   Exercise or Sports  Some people with asthma have this trigger. Be active!  Ask your doctor about taking medicine before sports or exercise to prevent symptoms.    Warm up for 5-10 minutes before and after sports or exercise.     Other Triggers of Asthma  Cold air:  Cover your nose and mouth with a scarf.  Sometimes laughing or crying can be a trigger.  Some medicines and food can trigger asthma.

## 2021-07-14 NOTE — PROGRESS NOTES
SUBJECTIVE:   CC: Phillip Nassar is an 55 year old male who presents for preventative health visit.       Patient has been advised of split billing requirements and indicates understanding: Yes  Healthy Habits:     Getting at least 3 servings of Calcium per day:  NO    Bi-annual eye exam:  Yes    Dental care twice a year:  Yes    Sleep apnea or symptoms of sleep apnea:  Daytime drowsiness and Excessive snoring    Diet:  Regular (no restrictions)    Frequency of exercise:  2-3 days/week    Duration of exercise:  15-30 minutes    Taking medications regularly:  Yes    Medication side effects:  None    PHQ-2 Total Score: 0      patient here for routine health maintenance exa, feels well overall, has had his covid vaccines. is due for some labs, has some leg pain intermittently as well as likely plantar fasciitis. is interested in talking about a medication for erectile dysfunction, can get an erection but doesn't always last long enough or isn't adequate to complete sexual activity. asthma is well controlled. NO issues with diet or SBO/diverticultis since our last visit and patient is happy about this. No other concerns or complaints today.     Today's PHQ-2 Score:   PHQ-2 ( 1999 Pfizer) 7/11/2021   Q1: Little interest or pleasure in doing things 0   Q2: Feeling down, depressed or hopeless 0   PHQ-2 Score 0   Q1: Little interest or pleasure in doing things Not at all   Q2: Feeling down, depressed or hopeless Not at all   PHQ-2 Score 0       Abuse: Current or Past(Physical, Sexual or Emotional)- No  Do you feel safe in your environment? Yes    Have you ever done Advance Care Planning? (For example, a Health Directive, POLST, or a discussion with a medical provider or your loved ones about your wishes): No, advance care planning information given to patient to review.  Patient plans to discuss their wishes with loved ones or provider.      Social History     Tobacco Use     Smoking status: Never Smoker     Smokeless  tobacco: Never Used   Substance Use Topics     Alcohol use: No     Alcohol/week: 0.0 standard drinks     If you drink alcohol do you typically have >3 drinks per day or >7 drinks per week? No    No flowsheet data found.    Last PSA:   PSA   Date Value Ref Range Status   12/10/2019 0.74 0 - 4 ug/L Final     Comment:     Assay Method:  Chemiluminescence using Siemens Vista analyzer     Prostate Specific Antigen Screen   Date Value Ref Range Status   07/14/2021 0.49 ug/L Final       Reviewed orders with patient. Reviewed health maintenance and updated orders accordingly - Yes  BP Readings from Last 3 Encounters:   07/14/21 122/74   05/19/21 130/88   12/29/20 121/78    Wt Readings from Last 3 Encounters:   07/14/21 98.4 kg (216 lb 14.4 oz)   12/29/20 99.4 kg (219 lb 3.2 oz)   12/10/19 98.9 kg (218 lb 1.6 oz)                    Reviewed and updated as needed this visit by clinical staff  Tobacco  Allergies  Meds   Med Hx  Surg Hx  Fam Hx  Soc Hx        Reviewed and updated as needed this visit by Provider                    Review of Systems   Constitutional: Negative for chills and fever.   HENT: Negative for congestion, ear pain, hearing loss and sore throat.    Eyes: Negative for pain and visual disturbance.   Respiratory: Negative for cough and shortness of breath.    Cardiovascular: Negative for chest pain and palpitations.   Gastrointestinal: Negative for abdominal pain, constipation, diarrhea, heartburn, hematochezia and nausea.   Genitourinary: Positive for frequency and impotence. Negative for discharge, dysuria, genital sores, hematuria and urgency.   Musculoskeletal: Positive for arthralgias, joint swelling and myalgias.   Skin: Negative for rash.   Neurological: Negative for dizziness, weakness, headaches and paresthesias.   Psychiatric/Behavioral: Negative for mood changes. The patient is not nervous/anxious.      discussed with patient (or patient's parents/caregiver) pathophysiology of condition and  "treatment options.  reviewed labs, medications, diet ,etc.       OBJECTIVE:   /74   Pulse 92   Temp 98.7  F (37.1  C) (Oral)   Resp 20   Ht 1.72 m (5' 7.72\")   Wt 98.4 kg (216 lb 14.4 oz)   BMI 33.26 kg/m      Wt Readings from Last 4 Encounters:   07/14/21 98.4 kg (216 lb 14.4 oz)   12/29/20 99.4 kg (219 lb 3.2 oz)   12/10/19 98.9 kg (218 lb 1.6 oz)   08/04/17 96.2 kg (212 lb)       Physical Exam  GENERAL: healthy, alert and no distress  EYES: Eyes grossly normal to inspection, PERRL and conjunctivae and sclerae normal  HENT: ear canals and TM's normal, nose and mouth covered with mask due to covid  NECK: no adenopathy, no asymmetry, masses, or scars and thyroid normal to palpation  RESP: lungs clear to auscultation - no rales, rhonchi or wheezes  CV: regular rate and rhythm, normal S1 S2, no S3 or S4, no murmur, click or rub, no peripheral edema and peripheral pulses strong  ABDOMEN: soft, nontender, no hepatosplenomegaly, no masses and bowel sounds normal  MS: no gross musculoskeletal defects noted, no edema  SKIN: no suspicious lesions or rashes  NEURO: Normal strength and tone, mentation intact and speech normal  PSYCH: mentation appears normal, affect normal/bright        ASSESSMENT/PLAN:   1. Routine general medical examination at a health care facility  d/w pt preventative care measures including seat belt use, bike helmet, moderation of EtOH, avoiding tobacco, avoiding excessive sun exposure/sunscreen, wt management or wt loss if BMI > 30, need to screen for lipid disorders, mood disorders, CAD risk factors, etc. Also discussed accident prevention and future RHM schedule.   - REVIEW OF HEALTH MAINTENANCE PROTOCOL ORDERS  - HIV Antigen Antibody Combo; Future  - Comprehensive metabolic panel (BMP + Alb, Alk Phos, ALT, AST, Total. Bili, TP); Future  - PSA, screen; Future  - Lipid panel reflex to direct LDL Fasting; Future  - HIV Antigen Antibody Combo  - Comprehensive metabolic panel (BMP + Alb, Alk " Phos, ALT, AST, Total. Bili, TP)  - PSA, screen  - Lipid panel reflex to direct LDL Fasting    2. Erectile dysfunction, unspecified erectile dysfunction type  discussed with patient (or patient's parents/caregiver) pathophysiology of condition and treatment options.  d/w pt pathophysiology of ED and treatment modalitis. patient would like to try viagra. New medication(s) indication(s), adverse effects, risks and benefits discussed. reviwed need to keep an eye on blood pressure, cholesterol, etc   - sildenafil (VIAGRA) 100 MG tablet; take 1/2 to one tab daily as needed for erectile dysfunction. take 30 min to 4 hours before sexual activity  Dispense: 4 tablet; Refill: 4    3. Mild intermittent asthma without complication  discussed with patient (or patient's parents/caregiver) pathophysiology of condition and treatment options.  Well controlled on current medication(s). continue cares and plan. patient does not need an albuterol refill today.     4. Plantar fasciitis  discussed with patient (or patient's parents/caregiver) pathophysiology of condition and treatment options.  agree with patient likely given history, constanza refer to [facility] for evaluation and exercises. reviwedrecommendations for conservative measures for this -- the 2 most important things to start with are wearing shoes with good insoles (or buying over the counter insoles to add) and trying using a frozen juice can or frozen tennis ball and rolling your foot on this twice daily.   Appropriate shoe wear and usage also reviewed as well as indications for further evaluation and podiatry/ortho evaluation, etc. Patient verbalized understanding and is agreeable to this plan.    - [facility] Referral; Future    5. Pain of left lower leg  as above, ssupect myofacila component hence [facility] referral. consider FSCO or othro if not improved with conservative cares. Patient verbalized understanding and is agreeable to this plan.   - [facility] Referral;  "Future    6. Screening for HIV (human immunodeficiency virus)  patient agrees with screening per guidlines   - HIV Antigen Antibody Combo; Future  - HIV Antigen Antibody Combo    7. Need for hepatitis C screening test  patient agrees with screening per guidlines   - Hepatitis C Screen Reflex to HCV RNA Quant and Genotype; Future  - Hepatitis C Screen Reflex to HCV RNA Quant and Genotype    8. CARDIOVASCULAR SCREENING; LDL GOAL LESS THAN 160  - Lipid panel reflex to direct LDL Fasting; Future  - Lipid panel reflex to direct LDL Fasting    9. Prostate cancer screening  - PSA, screen; Future  - PSA, screen    Patient has been advised of split billing requirements and indicates understanding: Yes  COUNSELING:   Reviewed preventive health counseling, as reflected in patient instructions    Estimated body mass index is 33.26 kg/m  as calculated from the following:    Height as of this encounter: 1.72 m (5' 7.72\").    Weight as of this encounter: 98.4 kg (216 lb 14.4 oz).     Weight management plan: Discussed healthy diet and exercise guidelines    He reports that he has never smoked. He has never used smokeless tobacco.      Counseling Resources:  ATP IV Guidelines  Pooled Cohorts Equation Calculator  FRAX Risk Assessment  ICSI Preventive Guidelines  Dietary Guidelines for Americans, 2010  USDA's MyPlate  ASA Prophylaxis  Lung CA Screening    Albert Chapman MD  [facility] [place]  "

## 2021-07-15 LAB
ALBUMIN SERPL-MCNC: 3.9 G/DL (ref 3.4–5)
ALP SERPL-CCNC: 58 U/L (ref 40–150)
ALT SERPL W P-5'-P-CCNC: 43 U/L (ref 0–70)
ANION GAP SERPL CALCULATED.3IONS-SCNC: 6 MMOL/L (ref 3–14)
AST SERPL W P-5'-P-CCNC: 21 U/L (ref 0–45)
BILIRUB SERPL-MCNC: 0.4 MG/DL (ref 0.2–1.3)
BUN SERPL-MCNC: 17 MG/DL (ref 7–30)
CALCIUM SERPL-MCNC: 8.8 MG/DL (ref 8.5–10.1)
CHLORIDE BLD-SCNC: 106 MMOL/L (ref 94–109)
CHOLEST SERPL-MCNC: 181 MG/DL
CO2 SERPL-SCNC: 27 MMOL/L (ref 20–32)
CREAT SERPL-MCNC: 1.01 MG/DL (ref 0.66–1.25)
FASTING STATUS PATIENT QL REPORTED: YES
GFR SERPL CREATININE-BSD FRML MDRD: 85 ML/MIN/1.73M2
GLUCOSE BLD-MCNC: 68 MG/DL (ref 70–99)
HCV AB SERPL QL IA: NONREACTIVE
HDLC SERPL-MCNC: 45 MG/DL
HIV 1+2 AB+HIV1 P24 AG SERPL QL IA: NONREACTIVE
LDLC SERPL CALC-MCNC: 123 MG/DL
NONHDLC SERPL-MCNC: 136 MG/DL
POTASSIUM BLD-SCNC: 4 MMOL/L (ref 3.4–5.3)
PROT SERPL-MCNC: 6.9 G/DL (ref 6.8–8.8)
PSA SERPL-MCNC: 0.49 UG/L
SODIUM SERPL-SCNC: 139 MMOL/L (ref 133–144)
TRIGL SERPL-MCNC: 67 MG/DL

## 2021-07-15 ASSESSMENT — ASTHMA QUESTIONNAIRES: ACT_TOTALSCORE: 23

## 2021-07-27 VITALS
BODY MASS INDEX: 31.84 KG/M2 | HEIGHT: 69 IN | OXYGEN SATURATION: 97 % | TEMPERATURE: 98.8 F | HEART RATE: 86 BPM | WEIGHT: 215 LBS

## 2021-07-29 ENCOUNTER — THERAPY VISIT (OUTPATIENT)
Dept: PHYSICAL THERAPY | Facility: CLINIC | Age: 56
End: 2021-07-29
Payer: COMMERCIAL

## 2021-07-29 DIAGNOSIS — M72.2 PLANTAR FASCIITIS OF LEFT FOOT: ICD-10-CM

## 2021-07-29 DIAGNOSIS — M54.50 ACUTE LEFT-SIDED LOW BACK PAIN: ICD-10-CM

## 2021-07-29 DIAGNOSIS — M79.662 PAIN OF LEFT LOWER LEG: ICD-10-CM

## 2021-07-29 DIAGNOSIS — M72.2 PLANTAR FASCIITIS: ICD-10-CM

## 2021-07-29 DIAGNOSIS — M25.562 KNEE PAIN, LEFT: ICD-10-CM

## 2021-07-29 PROBLEM — M54.12 CERVICAL RADICULOPATHY: Status: RESOLVED | Noted: 2017-06-26 | Resolved: 2021-07-29

## 2021-07-29 PROCEDURE — 97162 PT EVAL MOD COMPLEX 30 MIN: CPT | Mod: GP | Performed by: PHYSICAL THERAPIST

## 2021-07-29 PROCEDURE — 97110 THERAPEUTIC EXERCISES: CPT | Mod: GP | Performed by: PHYSICAL THERAPIST

## 2021-07-29 NOTE — PROGRESS NOTES
Physical Therapy Initial Evaluation  Subjective:  The history is provided by the patient. No  was used.   Patient Health History  Phillip Nassar being seen for L leg.     Date of Onset: April 2021.   Problem occurred: unknown   Pain is reported as 3/10 on pain scale.  General health as reported by patient is good.  Pertinent medical history includes: asthma and other. Other medical history details: diverticulitis.   Red flags:  None as reported by patient.  Medical allergies: none.   Surgeries include:  Orthopedic surgery. Other surgery history details: L ACL x 2.    Current medications:  None.    Current occupation is MRI tech.   Primary job tasks include:  Computer work, driving, lifting/carrying, prolonged sitting, prolonged standing, pushing/pulling and repetitive tasks.                  Therapist Generated HPI Evaluation  Problem details: Was diagnosed with L plantar fascitis around April 2021, steroid injection x 2 May and June 2021 plus custom orthotics have helped with the plantar fascitis pain but not the heel. Eventually began radiating up the posterior calf, medial knee and lateral thigh. Also complains L sided low back pain for the past month with possible numbness near the L heel. Starts limping around 1/2way through the day.    PMH of old ACLR x 2, with decreased strength and ROM following..         Type of problem:  Left foot.    This is a new condition.  Condition occurred with:  Insidious onset.  Where condition occurred: for unknown reasons.  Patient reports pain:  Other and longitudinal arch (heel).  Pain is described as aching and is intermittent.  Pain radiates to:  Lower leg, thigh, knee and low back. Pain is worse in the P.M..  Since onset symptoms are gradually worsening.  Associated symptoms:  Loss of motion/stiffness, numbness and loss of strength. Symptoms are exacerbated by standing, weight bearing, walking, activity, descending stairs, ascending stairs and  bending/squatting  and relieved by other and NSAID's (orthotics, rolling with a tennis ball).      Restrictions due to condition include:  Working in normal job without restrictions.  Barriers include:  None as reported by patient.                        Objective:  Standing Alignment:        Lumbar:  Lordosis decr      Knee:  Genu varus L and genu varus R  Ankle/Foot:  Pes planus R and pes planus L    Gait:    Gait Type:  Antalgic     Deviations:  Ankle:  Heel strike decr L and push off decr L    Flexibility/Screens:       Lower Extremity:  Decreased left lower extremity flexibility:IT Band; Gastroc and Soleus            Ankle/Foot Evaluation  ROM:    AROM:    Dorsiflexion:  Left:   10  Right:   14  Plantarflexion:  Left:  40    Right:  52            Strength:    Dorsiflexion:  Left: 5/5     Pain:   Right: 5/5   Pain:  Plantarflexion: Left: 5/5   Pain:   Right: 5/5  Pain:  Inversion:Left: 5-/5  Pain:     Right: 5/5  Pain:  Eversion:Left: 5-/5  Pain:  Right: 5/5  Pain:                      PALPATION: Palpation of ankle: Lateral calcaneal fat pad.    Left ankle tenderness not present at:   gastroc/soleus or plantar fascia        FUNCTIONAL TESTS:           Proprioception:  Stork Balance Test: Left: 10 sec  Right: 10 sec        Lumbar/SI Evaluation  ROM:  Arom wnl lumbar: REIL: improves ROM.  AROM Lumbar:   Flexion:        Floor  Ext:                    100%   Side Bend:        Left:     Right:   Rotation:           Left:     Right:   Side Glide:        Left:     Right:           Lumbar Myotomes:  normal                                                              Hip Evaluation  Hip PROM:  : assess next session.                          Hip Strength:    Flexion:   Left: 5/5   Pain:  Right: 5/5   Pain:                    Extension:  Left: 4-/5  Pain:Right: 5-/5    Pain:    Abduction:  Left: 4-/5     Pain:Right: 5-/5    Pain:                    Hip Palpation:    Left hip tenderness present at:   IT Band  Left hip  tenderness not present at:  Greater Trachanter         Knee Evaluation:  ROM:    AROM      Extension: Left: 3    Right:   Flexion: Left: 105   Right:  PROM      Extension: Left: 0   Right:   Flexion: Left: 141   Right:       Strength:     Extension:  Left: 5/5   Pain:      Right: 5/5   Pain:  Flexion:  Left: 5-/5   Pain:      Right: 5/5   Pain:    Quad Set Left: Good    Pain:   Quad Set Right: Good    Pain:                  General     ROS    Assessment/Plan:    Patient is a 55 year old male with lumbar, left side hip, left side knee and left side ankle complaints.    Patient has the following significant findings with corresponding treatment plan.                Diagnosis 1:  L plantar fascitis  Pain -  hot/cold therapy, US, manual therapy, education and home program  Decreased ROM/flexibility - manual therapy and therapeutic exercise  Decreased strength - therapeutic exercise and therapeutic activities  Decreased proprioception - neuro re-education and therapeutic activities  Impaired gait - gait training  Impaired muscle performance - neuro re-education  Decreased function - therapeutic activities  Diagnosis 2:  L LE pain (ITband syndrome vs lumbar radiculopathy)   Pain -  hot/cold therapy, education, directional preference exercise and home program  Decreased ROM/flexibility - manual therapy and therapeutic exercise  Decreased strength - therapeutic exercise and therapeutic activities  Impaired gait - gait training  Impaired muscle performance - neuro re-education  Decreased function - therapeutic activities  Impaired posture - neuro re-education    Therapy Evaluation Codes:   1) History comprised of:   Personal factors that impact the plan of care:      None.    Comorbidity factors that impact the plan of care are:      Asthma.     Medications impacting care: None.  2) Examination of Body Systems comprised of:   Body structures and functions that impact the plan of care:      Ankle, Hip, Knee and Lumbar  spine.   Activity limitations that impact the plan of care are:      Sports, Squatting/kneeling, Stairs, Standing, Walking and Working.  3) Clinical presentation characteristics are:   Evolving/Changing.  4) Decision-Making    Moderate complexity using standardized patient assessment instrument and/or measureable assessment of functional outcome.  Cumulative Therapy Evaluation is: Moderate complexity.    Previous and current functional limitations:  (See Goal Flow Sheet for this information)    Short term and Long term goals: (See Goal Flow Sheet for this information)     Communication ability:  Patient appears to be able to clearly communicate and understand verbal and written communication and follow directions correctly.  Treatment Explanation - The following has been discussed with the patient:   RX ordered/plan of care  Anticipated outcomes  Possible risks and side effects  This patient would benefit from PT intervention to resume normal activities.   Rehab potential is good.    Frequency:  1 X week, once daily  Duration:  for 8 weeks  Discharge Plan:  Achieve all LTG.  Independent in home treatment program.  Reach maximal therapeutic benefit.    Please refer to the daily flowsheet for treatment today, total treatment time and time spent performing 1:1 timed codes.

## 2021-08-19 ENCOUNTER — THERAPY VISIT (OUTPATIENT)
Dept: PHYSICAL THERAPY | Facility: CLINIC | Age: 56
End: 2021-08-19
Payer: COMMERCIAL

## 2021-08-19 DIAGNOSIS — M54.50 ACUTE LEFT-SIDED LOW BACK PAIN: ICD-10-CM

## 2021-08-19 DIAGNOSIS — M72.2 PLANTAR FASCIITIS OF LEFT FOOT: ICD-10-CM

## 2021-08-19 DIAGNOSIS — M25.562 KNEE PAIN, LEFT: ICD-10-CM

## 2021-08-19 PROCEDURE — 97112 NEUROMUSCULAR REEDUCATION: CPT | Mod: GP | Performed by: PHYSICAL THERAPIST

## 2021-08-19 PROCEDURE — 97110 THERAPEUTIC EXERCISES: CPT | Mod: GP | Performed by: PHYSICAL THERAPIST

## 2021-09-03 ENCOUNTER — THERAPY VISIT (OUTPATIENT)
Dept: PHYSICAL THERAPY | Facility: CLINIC | Age: 56
End: 2021-09-03
Payer: COMMERCIAL

## 2021-09-03 DIAGNOSIS — M54.50 ACUTE LEFT-SIDED LOW BACK PAIN: ICD-10-CM

## 2021-09-03 DIAGNOSIS — M25.562 KNEE PAIN, LEFT: ICD-10-CM

## 2021-09-03 DIAGNOSIS — M72.2 PLANTAR FASCIITIS OF LEFT FOOT: ICD-10-CM

## 2021-09-03 PROCEDURE — 97112 NEUROMUSCULAR REEDUCATION: CPT | Mod: GP | Performed by: PHYSICAL THERAPIST

## 2021-09-03 PROCEDURE — 97110 THERAPEUTIC EXERCISES: CPT | Mod: GP | Performed by: PHYSICAL THERAPIST

## 2021-09-08 ENCOUNTER — HOSPITAL ENCOUNTER (EMERGENCY)
Facility: CLINIC | Age: 56
Discharge: HOME | End: 2021-09-09
Attending: EMERGENCY MEDICINE | Admitting: EMERGENCY MEDICINE
Payer: COMMERCIAL

## 2021-09-08 ENCOUNTER — APPOINTMENT (OUTPATIENT)
Dept: CT IMAGING | Facility: CLINIC | Age: 56
End: 2021-09-08
Attending: EMERGENCY MEDICINE
Payer: COMMERCIAL

## 2021-09-08 DIAGNOSIS — K57.92 ACUTE DIVERTICULITIS: ICD-10-CM

## 2021-09-08 LAB
ALBUMIN SERPL-MCNC: 4 G/DL (ref 3.4–5)
ALBUMIN UR-MCNC: NEGATIVE MG/DL
ALP SERPL-CCNC: 71 U/L (ref 40–150)
ALT SERPL W P-5'-P-CCNC: 29 U/L (ref 0–70)
ANION GAP SERPL CALCULATED.3IONS-SCNC: 2 MMOL/L (ref 3–14)
APPEARANCE UR: CLEAR
AST SERPL W P-5'-P-CCNC: 11 U/L (ref 0–45)
BASOPHILS # BLD AUTO: 0 10E3/UL (ref 0–0.2)
BASOPHILS NFR BLD AUTO: 0 %
BILIRUB SERPL-MCNC: 0.6 MG/DL (ref 0.2–1.3)
BILIRUB UR QL STRIP: NEGATIVE
BUN SERPL-MCNC: 10 MG/DL (ref 7–30)
CALCIUM SERPL-MCNC: 8.6 MG/DL (ref 8.5–10.1)
CHLORIDE BLD-SCNC: 107 MMOL/L (ref 94–109)
CO2 SERPL-SCNC: 28 MMOL/L (ref 20–32)
COLOR UR AUTO: NORMAL
CREAT BLD-MCNC: 0.7 MG/DL (ref 0.5–1.2)
CREAT BLD-MCNC: 0.7 MG/DL (ref 0.7–1.3)
CREAT SERPL-MCNC: 0.74 MG/DL (ref 0.66–1.25)
EOSINOPHIL # BLD AUTO: 0.2 10E3/UL (ref 0–0.7)
EOSINOPHIL NFR BLD AUTO: 2 %
ERYTHROCYTE [DISTWIDTH] IN BLOOD BY AUTOMATED COUNT: 13.7 % (ref 10–15)
GFR SERPL CREATININE-BSD FRML MDRD: >60 ML/MIN/1.73M2
GFR SERPL CREATININE-BSD FRML MDRD: >90 ML/MIN/1.73M2
GLUCOSE BLD-MCNC: 98 MG/DL (ref 70–99)
GLUCOSE UR STRIP-MCNC: NEGATIVE MG/DL
HCT VFR BLD AUTO: 45.2 % (ref 40–53)
HGB BLD-MCNC: 14.7 G/DL (ref 13.3–17.7)
HGB UR QL STRIP: NEGATIVE
IMM GRANULOCYTES # BLD: 0 10E3/UL
IMM GRANULOCYTES NFR BLD: 0 %
KETONES UR STRIP-MCNC: NEGATIVE MG/DL
LEUKOCYTE ESTERASE UR QL STRIP: NEGATIVE
LIPASE SERPL-CCNC: 596 U/L (ref 73–393)
LYMPHOCYTES # BLD AUTO: 1.1 10E3/UL (ref 0.8–5.3)
LYMPHOCYTES NFR BLD AUTO: 11 %
MCH RBC QN AUTO: 28.2 PG (ref 26.5–33)
MCHC RBC AUTO-ENTMCNC: 32.5 G/DL (ref 31.5–36.5)
MCV RBC AUTO: 87 FL (ref 78–100)
MONOCYTES # BLD AUTO: 0.9 10E3/UL (ref 0–1.3)
MONOCYTES NFR BLD AUTO: 10 %
NEUTROPHILS # BLD AUTO: 7.3 10E3/UL (ref 1.6–8.3)
NEUTROPHILS NFR BLD AUTO: 77 %
NITRATE UR QL: NEGATIVE
NRBC # BLD AUTO: 0 10E3/UL
NRBC BLD AUTO-RTO: 0 /100
PH UR STRIP: 5.5 [PH] (ref 5–7)
PLATELET # BLD AUTO: 205 10E3/UL (ref 150–450)
POTASSIUM BLD-SCNC: 4 MMOL/L (ref 3.4–5.3)
PROT SERPL-MCNC: 7.1 G/DL (ref 6.8–8.8)
RBC # BLD AUTO: 5.21 10E6/UL (ref 4.4–5.9)
RBC URINE: 0 /HPF
SODIUM SERPL-SCNC: 137 MMOL/L (ref 133–144)
SP GR UR STRIP: 1 (ref 1–1.03)
SQUAMOUS EPITHELIAL: <1 /HPF
UROBILINOGEN UR STRIP-MCNC: NORMAL MG/DL
WBC # BLD AUTO: 9.5 10E3/UL (ref 4–11)
WBC URINE: <1 /HPF

## 2021-09-08 PROCEDURE — 96374 THER/PROPH/DIAG INJ IV PUSH: CPT | Mod: 59

## 2021-09-08 PROCEDURE — 85025 COMPLETE CBC W/AUTO DIFF WBC: CPT | Performed by: EMERGENCY MEDICINE

## 2021-09-08 PROCEDURE — 80053 COMPREHEN METABOLIC PANEL: CPT | Performed by: EMERGENCY MEDICINE

## 2021-09-08 PROCEDURE — 250N000011 HC RX IP 250 OP 636: Performed by: EMERGENCY MEDICINE

## 2021-09-08 PROCEDURE — 74177 CT ABD & PELVIS W/CONTRAST: CPT

## 2021-09-08 PROCEDURE — 96361 HYDRATE IV INFUSION ADD-ON: CPT

## 2021-09-08 PROCEDURE — 82565 ASSAY OF CREATININE: CPT

## 2021-09-08 PROCEDURE — 81001 URINALYSIS AUTO W/SCOPE: CPT | Performed by: EMERGENCY MEDICINE

## 2021-09-08 PROCEDURE — 83690 ASSAY OF LIPASE: CPT | Performed by: EMERGENCY MEDICINE

## 2021-09-08 PROCEDURE — 258N000003 HC RX IP 258 OP 636: Performed by: EMERGENCY MEDICINE

## 2021-09-08 PROCEDURE — 250N000009 HC RX 250: Performed by: EMERGENCY MEDICINE

## 2021-09-08 PROCEDURE — 36415 COLL VENOUS BLD VENIPUNCTURE: CPT | Performed by: EMERGENCY MEDICINE

## 2021-09-08 PROCEDURE — 99285 EMERGENCY DEPT VISIT HI MDM: CPT | Mod: 25

## 2021-09-08 RX ORDER — IOPAMIDOL 755 MG/ML
500 INJECTION, SOLUTION INTRAVASCULAR ONCE
Status: COMPLETED | OUTPATIENT
Start: 2021-09-08 | End: 2021-09-08

## 2021-09-08 RX ORDER — ONDANSETRON 2 MG/ML
4 INJECTION INTRAMUSCULAR; INTRAVENOUS ONCE
Status: DISCONTINUED | OUTPATIENT
Start: 2021-09-08 | End: 2021-09-09 | Stop reason: HOSPADM

## 2021-09-08 RX ORDER — HYDROCODONE BITARTRATE AND ACETAMINOPHEN 5; 325 MG/1; MG/1
1 TABLET ORAL EVERY 6 HOURS PRN
Qty: 10 TABLET | Refills: 0 | Status: SHIPPED | OUTPATIENT
Start: 2021-09-08 | End: 2022-12-19

## 2021-09-08 RX ORDER — HYDROMORPHONE HYDROCHLORIDE 1 MG/ML
0.5 INJECTION, SOLUTION INTRAMUSCULAR; INTRAVENOUS; SUBCUTANEOUS
Status: DISCONTINUED | OUTPATIENT
Start: 2021-09-08 | End: 2021-09-09 | Stop reason: HOSPADM

## 2021-09-08 RX ADMIN — SODIUM CHLORIDE 65 ML: 9 INJECTION, SOLUTION INTRAVENOUS at 23:14

## 2021-09-08 RX ADMIN — SODIUM CHLORIDE 1000 ML: 9 INJECTION, SOLUTION INTRAVENOUS at 22:46

## 2021-09-08 RX ADMIN — IOPAMIDOL 100 ML: 755 INJECTION, SOLUTION INTRAVENOUS at 23:13

## 2021-09-08 RX ADMIN — HYDROMORPHONE HYDROCHLORIDE 0.5 MG: 1 INJECTION, SOLUTION INTRAMUSCULAR; INTRAVENOUS; SUBCUTANEOUS at 22:56

## 2021-09-09 VITALS
HEART RATE: 80 BPM | RESPIRATION RATE: 18 BRPM | DIASTOLIC BLOOD PRESSURE: 81 MMHG | SYSTOLIC BLOOD PRESSURE: 137 MMHG | TEMPERATURE: 99.2 F | OXYGEN SATURATION: 94 %

## 2021-09-09 LAB
HOLD SPECIMEN: NORMAL
HOLD SPECIMEN: NORMAL

## 2021-09-09 PROCEDURE — 250N000011 HC RX IP 250 OP 636: Performed by: EMERGENCY MEDICINE

## 2021-09-09 RX ADMIN — HYDROMORPHONE HYDROCHLORIDE 0.5 MG: 1 INJECTION, SOLUTION INTRAMUSCULAR; INTRAVENOUS; SUBCUTANEOUS at 00:14

## 2021-09-09 NOTE — ED TRIAGE NOTES
Abdominal pain. Hx of diverticulitis. Feels the same. Pain waxes and wanes. Worse with full bladder.

## 2021-09-09 NOTE — ED PROVIDER NOTES
"  History   Chief Complaint:  Abdominal Pain     HPI   Phillip Nassar is a 55 year old male with history of *** who presents with ***.    Review of Systems  ***    Allergies:  No Known Drug Allergies    Medications:  Albuterol inhaler  Zyrtec allergy  Flonase  Viagra    Past Medical History:    Allergic rhinitis  Diverticulitis of colon  Asthma  Prostate infection  Plantar fasciitis of left foot  Acute left-sided low back pain  Left knee pain  Acute diverticulitis  DJD of shoulder  Shoulder impingement syndrome  SBO    Past Surgical History:    Colonoscopy (x3)  Sinus surgery  Left knee arthroscopy    Family History:    Father: diabetes  Mother: Alzheimer's disease    Social History:  PCP: Albert Chapman  No history of smoking  ***    Physical Exam     Patient Vitals for the past 24 hrs:   BP Temp Temp src Pulse Resp SpO2   09/08/21 2132 (!) 148/94 99.2  F (37.3  C) Temporal 92 18 95 %       Physical Exam  ***    Emergency Department Course   ECG  ECG taken at ***, ECG read at ***  ***   *** as compared to prior, dated ***/***/***.  Rate *** bpm. AR interval *** ms. QRS duration *** ms. QT/QTc *** ms. P-R-T axes *** *** ***.     Imaging:  No orders to display       Laboratory:  ***   CBC: WBC ***, HGB ***, PLT ***     Procedures  ***    Emergency Department Course:    Reviewed:  ***  I reviewed {EPPTIMDRlataiew:175618}    Assessments:  *** I obtained history and examined the patient as noted above.   *** I rechecked the patient*** and explained findings***.   ***    Consults:   ***    Interventions:  ***    Disposition:  {EPPAFV Dispo:261980}      Impression & Plan   {[facility] Quality Projects:860984}    CMS Diagnoses: {Sepsis/Septic Shock/Stemi/Stroke:182502::\"None\"}  {FVTrauma:516037}    Medical Decision Making:  ***     Critical Care Time: was *** minutes for this patient excluding procedures    Covid-19  Phillip Nassar was evaluated during a global COVID-19 pandemic, which necessitated consideration " that the patient might be at risk for infection with the SARS-CoV-2 virus that causes COVID-19.   Applicable protocols for evaluation were followed during the patient's care.   COVID-19 was considered as part of the patient's evaluation. The plan for testing is:  a test was obtained during this visit.***  a test was obtained at a previous visit and reviewed & considered today.***  the patient was referred for outpatient testing.***   ***     Diagnosis:  No diagnosis found.    Discharge Medications:  New Prescriptions    No medications on file       Scribe Disclosure:  Lisette ESCAMILLA, am serving as a scribe at 10:36 PM on 9/8/2021 to document services personally performed by Flavio Pinedo MD based on my observations and the provider's statements to me.     ***

## 2021-09-09 NOTE — ED PROVIDER NOTES
Visit Date: 09/08/2021    CHIEF COMPLAINT:  Abdominal pain.    HISTORY OF PRESENT ILLNESS:  This is a 55-year-old gentleman with a history of diverticulitis who presents with progressive abdominal pain over the past 3-4 days.  This is in his lower abdomen.  He has had the urge to pass stool.  No blood in the stool.  No urinary symptoms.  No fevers.  He states movement makes the pain worse.  He works as an MRI tech and initially thought he may have strained his abdomen, but as the pain has progressed, he feels it is more likely to be his diverticulitis, which is recurrent.  No other complaints at this time.    PAST MEDICAL HISTORY:     1.  Diverticulitis.  2.  Allergic rhinitis.  3.  Asthma.    PAST SURGICAL HISTORY:     1.  Colonoscopy.  2.  Knee arthroscopic surgery.  3.  Sinus surgery.    MEDICATIONS:     1.  Albuterol.  2.  Zyrtec.  3.  Flonase.  4.  Viagra.    ALLERGIES:  NONE.    SOCIAL HISTORY:  The patient presents to the Emergency Department by himself.    REVIEW OF SYSTEMS:  Pertinent 10-point review of systems is negative except for that noted in the HPI.    PHYSICAL EXAMINATION:    GENERAL:  The patient is sitting up comfortably in bed.  VITAL SIGNS:  Blood pressure 137/81, heart rate 80, respiratory rate 18, oxygen 94% on room air, temperature 99.2 degrees.  HEENT:  Atraumatic.  Moist mucous membranes.  CARDIOVASCULAR:  Regular rhythm, normal rate.  No murmurs.  RESPIRATORY:  Clear to auscultation bilaterally, without wheezes or rales.  GASTROINTESTINAL:  Soft, nondistended, diffuse lower abdominal tenderness, more prominent on the left side.  No guarding or rigidity.  No distention.  SKIN:  No pertinent skin findings.  NEUROLOGIC:  The patient is alert and oriented x 4.  PSYCHIATRIC:  The patient has normal mood and affect.    LABORATORY EVALUATION AND DIAGNOSTIC STUDIES:  CT scan of the abdomen with contrast shows acute uncomplicated diverticulitis to the mid and distal sigmoid colon with enlargement  of numerous diverticula and moderate surrounding inflammatory changes and moderate wall thickening throughout this region.  There is colonic diverticulitis without other sites of active diverticulitis noted.  Degenerative changes in the spine.  Hepatic steatosis.    LABORATORY DATA:  Comprehensive metabolic panel is normal with a creatinine of 0.74.  Lipase is slightly elevated at 596.  CBC is normal with a white blood cell count of 9.5, hemoglobin 14.7 and platelets 205.  Urinalysis is unremarkable.    EMERGENCY DEPARTMENT COURSE AND MEDICAL DECISION MAKING:  This is a 55-year-old gentleman who presents with abdominal pain.  CT confirms acute uncomplicated sigmoid diverticulitis without evidence of perforation or abscess.  I will start him on oral antibiotics with appropriate followup and return precautions should his symptoms be worsening or progressing in any way.  Pain medicine provided at discharge with appropriate teaching.    DIAGNOSIS:  Acute sigmoid diverticulitis.    PLAN OF CARE:  As above.    Osmani Hennessy MD        D: 2021   T: 2021   MT: BLAISE    Name:     EARLINE WOODS  MRN:      [id number]        Account:    [id number]   :      1965           Visit Date: 2021     Document: [id number]

## 2021-09-13 ENCOUNTER — MYC MEDICAL ADVICE (OUTPATIENT)
Dept: PEDIATRICS | Facility: CLINIC | Age: 56
End: 2021-09-13

## 2021-09-14 ENCOUNTER — E-VISIT (OUTPATIENT)
Dept: PEDIATRICS | Facility: CLINIC | Age: 56
End: 2021-09-14
Payer: COMMERCIAL

## 2021-09-14 DIAGNOSIS — K57.32 DIVERTICULITIS OF COLON: Primary | ICD-10-CM

## 2021-09-14 PROCEDURE — 99421 OL DIG E/M SVC 5-10 MIN: CPT | Performed by: NURSE PRACTITIONER

## 2021-09-17 ENCOUNTER — THERAPY VISIT (OUTPATIENT)
Dept: PHYSICAL THERAPY | Facility: CLINIC | Age: 56
End: 2021-09-17
Payer: COMMERCIAL

## 2021-09-17 DIAGNOSIS — M25.562 KNEE PAIN, LEFT: ICD-10-CM

## 2021-09-17 DIAGNOSIS — M54.50 ACUTE LEFT-SIDED LOW BACK PAIN: ICD-10-CM

## 2021-09-17 DIAGNOSIS — M72.2 PLANTAR FASCIITIS OF LEFT FOOT: ICD-10-CM

## 2021-09-17 PROCEDURE — 97140 MANUAL THERAPY 1/> REGIONS: CPT | Mod: GP | Performed by: PHYSICAL THERAPIST

## 2021-09-17 PROCEDURE — 97112 NEUROMUSCULAR REEDUCATION: CPT | Mod: GP | Performed by: PHYSICAL THERAPIST

## 2021-09-17 PROCEDURE — 97110 THERAPEUTIC EXERCISES: CPT | Mod: GP | Performed by: PHYSICAL THERAPIST

## 2021-09-24 ENCOUNTER — THERAPY VISIT (OUTPATIENT)
Dept: PHYSICAL THERAPY | Facility: CLINIC | Age: 56
End: 2021-09-24
Payer: COMMERCIAL

## 2021-09-24 DIAGNOSIS — M54.50 ACUTE LEFT-SIDED LOW BACK PAIN: ICD-10-CM

## 2021-09-24 DIAGNOSIS — M25.562 KNEE PAIN, LEFT: ICD-10-CM

## 2021-09-24 DIAGNOSIS — M72.2 PLANTAR FASCIITIS OF LEFT FOOT: ICD-10-CM

## 2021-09-24 PROCEDURE — 97112 NEUROMUSCULAR REEDUCATION: CPT | Mod: GP | Performed by: PHYSICAL THERAPIST

## 2021-09-24 PROCEDURE — 97110 THERAPEUTIC EXERCISES: CPT | Mod: GP | Performed by: PHYSICAL THERAPIST

## 2021-10-28 ENCOUNTER — OFFICE VISIT (OUTPATIENT)
Dept: NEUROSURGERY | Facility: CLINIC | Age: 56
End: 2021-10-28
Attending: PHYSICIAN ASSISTANT
Payer: COMMERCIAL

## 2021-10-28 VITALS — HEART RATE: 81 BPM | DIASTOLIC BLOOD PRESSURE: 93 MMHG | SYSTOLIC BLOOD PRESSURE: 142 MMHG | OXYGEN SATURATION: 96 %

## 2021-10-28 DIAGNOSIS — M54.16 LUMBAR RADICULOPATHY: ICD-10-CM

## 2021-10-28 PROCEDURE — G0463 HOSPITAL OUTPT CLINIC VISIT: HCPCS

## 2021-10-28 PROCEDURE — 99204 OFFICE O/P NEW MOD 45 MIN: CPT | Performed by: PHYSICIAN ASSISTANT

## 2021-10-28 ASSESSMENT — PAIN SCALES - GENERAL: PAINLEVEL: MODERATE PAIN (4)

## 2021-10-28 NOTE — LETTER
10/28/2021         RE: Phillip Nassar  [address]  [place] [address]        Dear Colleague,    Thank you for referring your patient, Phillip Nassar, to the [facility] [place]. Please see a copy of my visit note below.    NEUROSURGERY CLINIC CONSULT NOTE     DATE OF VISIT: 10/28/2021     SUBJECTIVE:     Phillip Nassar is a pleasant 56 year old male who presents to the clinic today for consultation on lumbar radiculopathy. He is referred to the Neurosurgery Clinic by Dr. Chapman in Primary Care.     Phillip has been going to physical therapy for plantar fasciitis which has since resolved sinec therapy. However, he continues to have pain and paresthesias in his left buttock, posterior thigh, medial calf and was therefore referred to our team for further evaluation    Today, he reports a several-month history of symptoms. He describes constant, dull, aching pain that initiates in the left low lumbar region and radiates distally into left buttock, posterior thigh, medial calf. This pain is accompanied by paresthesia, numbness and/or perceived weakness in the same distribution. Prolonged walking, standing, sitting, poor posture aggravate the symptoms, while alleviation is obtained by good posture and resting. No mechanism of injury such as trauma or a fall is associated with the onset of the pain. There are no bowel or bladder changes. He denies saddle anesthesia. He denies changes in gait, instability, or falling episodes.     He has participated in conservative therapies to include physical therapy, NSAIDs, inversion table. None of these modalities have provided any significant long term relief.          Current Outpatient Medications:      albuterol (PROAIR HFA;PROVENTIL HFA;VENTOLIN HFA) 90 mcg/actuation inhaler, [ALBUTEROL (PROAIR HFA;PROVENTIL HFA;VENTOLIN HFA) 90 MCG/ACTUATION INHALER] Inhale 2 puffs., Disp: , Rfl:      cetirizine HCl (ZYRTEC ALLERGY) 10 MG CAPS,  Take by mouth daily , Disp: , Rfl:      fluticasone (FLONASE) 50 MCG/ACT nasal spray, Spray 1-2 sprays into both nostrils daily, Disp: 3 Package, Rfl: 3     HYDROcodone-acetaminophen (NORCO) 5-325 MG tablet, Take 1 tablet by mouth every 6 hours as needed for pain, Disp: 10 tablet, Rfl: 0     sildenafil (VIAGRA) 100 MG tablet, take 1/2 to one tab daily as needed for erectile dysfunction. take 30 min to 4 hours before sexual activity, Disp: 4 tablet, Rfl: 4     Allergies   Allergen Reactions     No Known Drug Allergies         Past Medical History:   Diagnosis Date     Allergic rhinitis      Diverticulitis      Mild intermittent asthma         ROS: 10 point review of symptoms are negative other than the symptoms noted above in the HPI.     Family History has been reviewed with the patient, there are no pertinent findings to presenting concern.     Past Surgical History:   Procedure Laterality Date     ARTHROSCOPY KNEE       COLONOSCOPY  03/07/2014    Procedure: COLONOSCOPY;  Colonoscopy  ;  Surgeon: Gucci Martin MD;  Location:  GI     COLONOSCOPY N/A 02/28/2017    Procedure: COLONOSCOPY;  Surgeon: Destiny Bruks MD;  Location:  GI     SINUS SURGERY          Social History     Tobacco Use     Smoking status: Never Smoker     Smokeless tobacco: Never Used   Substance Use Topics     Alcohol use: No     Alcohol/week: 0.0 standard drinks     Drug use: No        OBJECTIVE:   BP (!) 142/93   Pulse 81   SpO2 96%    There is no height or weight on file to calculate BMI.     Exam:   CN II-XII grossly intact, alert and appropriate with conversation and following commands.   Gait is non-antalgic. Able to tandem walk. Able to walk on toes and heels without difficulty.   Cervical spine is non tender to palpation. Appropriate range of motion of neck, not concerning for lhermitte's phenomenon.   Bilateral bicep 2/4 and tricep reflexes 1/4. Sensation intact throughout upper extremities.     UE muscle strength  Right   Left    Deltoid  5/5  5/5    Biceps  5/5  5/5    Triceps  5/5  5/5    Hand intrinsics  5/5  5/5    Hand grasp  5/5  5/5           Lumbar spine is tender to palpation lower left paraspinous muscle  Intact sensation throughout lower extremities.   Bilateral patellar 2/4 and achilles reflex 1/4. Negative for pain with straight leg raise.     LE muscle strength  Right  Left    Iliopsoas (hip flexion)  5/5  5/5    Quad (knee extension)  5/5  5/5    Hamstring (knee flexion)  5/5  5/5    Gastrocnemius (PF)  5/5  5/5    Tibialis Ant. (DF)  5/5  5/5    EHL  5/5  5/5    Negative for clonus.   Calves are soft and non-tender bilaterally.     ASSESSMENT/PLAN:     Phillip Nassar is a pleasant 56 year old male who presents to the clinic today for consultation on lumbar radiculopathy. On exam, he is noted to have appropriate strength, sensation and range of motion. He has attempted conservative management without resolution of symptoms.     Based on his physical exam, we do feel that it would be in his best interest to obtain a lumbar spine MRI to further assess our concern for lumbar stenosis, a bulging / herniated disk or other concerning pathology. Once the images have been obtained she has agreed to call our office back at [phone] to further discuss possible surgical interventions or other conservative therapies.      In the event that patient's symptoms worsen or change we would like to see him sooner. We also discussed signs of a worsening problem that he should seek being evaluated.        Respectfully,     Madai Samayoa PA-C  [facility] Neurosurgery  [address] [address]    Tel [phone]        Again, thank you for allowing me to participate in the care of your patient.        Sincerely,        Madai Samayoa PA-C

## 2021-10-29 NOTE — PROGRESS NOTES
NEUROSURGERY CLINIC CONSULT NOTE     DATE OF VISIT: 10/28/2021     SUBJECTIVE:     Phillip Nassar is a pleasant 56 year old male who presents to the clinic today for consultation on lumbar radiculopathy. He is referred to the Neurosurgery Clinic by Dr. Chapman in Primary Care.     Phillip has been going to physical therapy for plantar fasciitis which has since resolved sinec therapy. However, he continues to have pain and paresthesias in his left buttock, posterior thigh, medial calf and was therefore referred to our team for further evaluation    Today, he reports a several-month history of symptoms. He describes constant, dull, aching pain that initiates in the left low lumbar region and radiates distally into left buttock, posterior thigh, medial calf. This pain is accompanied by paresthesia, numbness and/or perceived weakness in the same distribution. Prolonged walking, standing, sitting, poor posture aggravate the symptoms, while alleviation is obtained by good posture and resting. No mechanism of injury such as trauma or a fall is associated with the onset of the pain. There are no bowel or bladder changes. He denies saddle anesthesia. He denies changes in gait, instability, or falling episodes.     He has participated in conservative therapies to include physical therapy, NSAIDs, inversion table. None of these modalities have provided any significant long term relief.          Current Outpatient Medications:      albuterol (PROAIR HFA;PROVENTIL HFA;VENTOLIN HFA) 90 mcg/actuation inhaler, [ALBUTEROL (PROAIR HFA;PROVENTIL HFA;VENTOLIN HFA) 90 MCG/ACTUATION INHALER] Inhale 2 puffs., Disp: , Rfl:      cetirizine HCl (ZYRTEC ALLERGY) 10 MG CAPS, Take by mouth daily , Disp: , Rfl:      fluticasone (FLONASE) 50 MCG/ACT nasal spray, Spray 1-2 sprays into both nostrils daily, Disp: 3 Package, Rfl: 3     HYDROcodone-acetaminophen (NORCO) 5-325 MG tablet, Take 1 tablet by mouth every 6 hours as needed for pain, Disp: 10  tablet, Rfl: 0     sildenafil (VIAGRA) 100 MG tablet, take 1/2 to one tab daily as needed for erectile dysfunction. take 30 min to 4 hours before sexual activity, Disp: 4 tablet, Rfl: 4     Allergies   Allergen Reactions     No Known Drug Allergies         Past Medical History:   Diagnosis Date     Allergic rhinitis      Diverticulitis      Mild intermittent asthma         ROS: 10 point review of symptoms are negative other than the symptoms noted above in the HPI.     Family History has been reviewed with the patient, there are no pertinent findings to presenting concern.     Past Surgical History:   Procedure Laterality Date     ARTHROSCOPY KNEE       COLONOSCOPY  03/07/2014    Procedure: COLONOSCOPY;  Colonoscopy  ;  Surgeon: Gucci Martin MD;  Location:  GI     COLONOSCOPY N/A 02/28/2017    Procedure: COLONOSCOPY;  Surgeon: Destiny Burks MD;  Location:  GI     SINUS SURGERY          Social History     Tobacco Use     Smoking status: Never Smoker     Smokeless tobacco: Never Used   Substance Use Topics     Alcohol use: No     Alcohol/week: 0.0 standard drinks     Drug use: No        OBJECTIVE:   BP (!) 142/93   Pulse 81   SpO2 96%    There is no height or weight on file to calculate BMI.     Exam:   CN II-XII grossly intact, alert and appropriate with conversation and following commands.   Gait is non-antalgic. Able to tandem walk. Able to walk on toes and heels without difficulty.   Cervical spine is non tender to palpation. Appropriate range of motion of neck, not concerning for lhermitte's phenomenon.   Bilateral bicep 2/4 and tricep reflexes 1/4. Sensation intact throughout upper extremities.     UE muscle strength  Right  Left    Deltoid  5/5  5/5    Biceps  5/5  5/5    Triceps  5/5  5/5    Hand intrinsics  5/5  5/5    Hand grasp  5/5  5/5           Lumbar spine is tender to palpation lower left paraspinous muscle  Intact sensation throughout lower extremities.   Bilateral patellar 2/4 and  achilles reflex 1/4. Negative for pain with straight leg raise.     LE muscle strength  Right  Left    Iliopsoas (hip flexion)  5/5  5/5    Quad (knee extension)  5/5  5/5    Hamstring (knee flexion)  5/5  5/5    Gastrocnemius (PF)  5/5  5/5    Tibialis Ant. (DF)  5/5  5/5    EHL  5/5  5/5    Negative for clonus.   Calves are soft and non-tender bilaterally.     ASSESSMENT/PLAN:     Phillip Nassar is a pleasant 56 year old male who presents to the clinic today for consultation on lumbar radiculopathy. On exam, he is noted to have appropriate strength, sensation and range of motion. He has attempted conservative management without resolution of symptoms.     Based on his physical exam, we do feel that it would be in his best interest to obtain a lumbar spine MRI to further assess our concern for lumbar stenosis, a bulging / herniated disk or other concerning pathology. Once the images have been obtained she has agreed to call our office back at [phone] to further discuss possible surgical interventions or other conservative therapies.      In the event that patient's symptoms worsen or change we would like to see him sooner. We also discussed signs of a worsening problem that he should seek being evaluated.        Respectfully,     Madai HERNANDEZ [facility] Neurosurgery  [address] [address]    Tel [phone]

## 2021-11-02 ENCOUNTER — HOSPITAL ENCOUNTER (OUTPATIENT)
Dept: MRI IMAGING | Facility: CLINIC | Age: 56
Discharge: HOME | End: 2021-11-02
Attending: PHYSICIAN ASSISTANT | Admitting: PHYSICIAN ASSISTANT
Payer: COMMERCIAL

## 2021-11-02 DIAGNOSIS — M54.16 LUMBAR RADICULOPATHY: ICD-10-CM

## 2021-11-02 PROCEDURE — 72148 MRI LUMBAR SPINE W/O DYE: CPT | Mod: 26 | Performed by: RADIOLOGY

## 2021-11-02 PROCEDURE — 72148 MRI LUMBAR SPINE W/O DYE: CPT

## 2021-11-04 ENCOUNTER — TELEPHONE (OUTPATIENT)
Dept: NEUROSURGERY | Facility: CLINIC | Age: 56
End: 2021-11-04

## 2021-11-17 ENCOUNTER — THERAPY VISIT (OUTPATIENT)
Dept: PHYSICAL THERAPY | Facility: CLINIC | Age: 56
End: 2021-11-17
Payer: COMMERCIAL

## 2021-11-17 DIAGNOSIS — M25.562 KNEE PAIN, LEFT: ICD-10-CM

## 2021-11-17 DIAGNOSIS — M54.50 ACUTE LEFT-SIDED LOW BACK PAIN: ICD-10-CM

## 2021-11-17 DIAGNOSIS — M72.2 PLANTAR FASCIITIS OF LEFT FOOT: ICD-10-CM

## 2021-11-17 PROCEDURE — 97112 NEUROMUSCULAR REEDUCATION: CPT | Mod: GP | Performed by: PHYSICAL THERAPIST

## 2021-11-17 PROCEDURE — 97110 THERAPEUTIC EXERCISES: CPT | Mod: GP | Performed by: PHYSICAL THERAPIST

## 2021-11-17 NOTE — PROGRESS NOTES
Physical Therapy Initial Evaluation  Subjective:  HPI                    Objective:  System    Physical Exam    General     ROS    Assessment/Plan:    PROGRESS  REPORT    Progress reporting period is from 7/29/2021 to 11/17/2021.       SUBJECTIVE  Subjective changes noted by patient:  Took the inserts out of his shoes and got a new pair of shoes, since then his left leg has been feeling better, until today. Today reports a burning sensation in the anterior/medial thigh, with some numbess in his arch. Lumbar MRI ordered, L5-S1 anteriolisthesis and foraminal stenosis.       Current pain level is 3/10.     Previous pain level was 3/10.   Changes in function:  Yes (See Goal flowsheet attached for changes in current functional level)  Adverse reaction to treatment or activity: None    OBJECTIVE  Changes noted in objective findings:  Yes,   Objective:   Gait: normal,   AROM Lumbar Flx: floor, Ext: 100%,   Lumbar Myotomes Hip Flx R: +4/5, L: 5/5, all others normal;   Strength Hip Abd B: +4/5, Hip Ext L: 4/5, R: +4/5;   TrA Contraction: good -      ASSESSMENT/PLAN  Updated problem list and treatment plan: Diagnosis 1:  L LE pain (likely lumbar in origin)  Pain -  hot/cold therapy, manual therapy, education, directional preference exercise and home program  Decreased strength - therapeutic exercise and therapeutic activities  Impaired muscle performance - neuro re-education  Decreased function - therapeutic activities  STG/LTGs have been met or progress has been made towards goals:  Yes (See Goal flow sheet completed today.)  Assessment of Progress: The patient's condition is improving.  The patient's condition has potential to improve.  The patient has had set backs in their progress.  Patient is meeting short term goals and is progressing towards long term goals.  Self Management Plans:  Patient has been instructed in a home treatment program.  I have re-evaluated this patient and find that the nature, scope, duration and  intensity of the therapy is appropriate for the medical condition of the patient.  Phillip continues to require the following intervention to meet STG and LTG's:  PT    Recommendations:  This patient would benefit from continued therapy.     Frequency:  2 X a month, once daily  Duration:  for 6 weeks        Please refer to the daily flowsheet for treatment today, total treatment time and time spent performing 1:1 timed codes.

## 2021-12-17 ENCOUNTER — THERAPY VISIT (OUTPATIENT)
Dept: PHYSICAL THERAPY | Facility: CLINIC | Age: 56
End: 2021-12-17
Payer: COMMERCIAL

## 2021-12-17 DIAGNOSIS — M54.50 ACUTE LEFT-SIDED LOW BACK PAIN: ICD-10-CM

## 2021-12-17 DIAGNOSIS — M25.562 KNEE PAIN, LEFT: ICD-10-CM

## 2021-12-17 DIAGNOSIS — M72.2 PLANTAR FASCIITIS OF LEFT FOOT: ICD-10-CM

## 2021-12-17 PROCEDURE — 97110 THERAPEUTIC EXERCISES: CPT | Mod: GP | Performed by: PHYSICAL THERAPIST

## 2021-12-17 PROCEDURE — 97112 NEUROMUSCULAR REEDUCATION: CPT | Mod: GP | Performed by: PHYSICAL THERAPIST

## 2022-02-07 PROBLEM — M72.2 PLANTAR FASCIITIS OF LEFT FOOT: Status: RESOLVED | Noted: 2021-07-29 | Resolved: 2022-02-07

## 2022-02-07 PROBLEM — M54.50 ACUTE LEFT-SIDED LOW BACK PAIN: Status: RESOLVED | Noted: 2021-07-29 | Resolved: 2022-02-07

## 2022-02-07 PROBLEM — M25.562 KNEE PAIN, LEFT: Status: RESOLVED | Noted: 2021-07-29 | Resolved: 2022-02-07

## 2022-02-07 NOTE — PROGRESS NOTES
Discharge Note    Progress reporting period is from last progress note on 11/17/21 to Dec 17, 2021.    Phillip failed to follow up and current status is unknown.  Please see information below for last relevant information on current status.  Patient seen for 7 visits.    SUBJECTIVE  Subjective changes noted by patient:  Improving since switching to flexion. However, does continue to wake with some pain in the posterior L thigh, as the day progresses it travels further down the leg. Tweated his back earlier today when stretching, but this pain was higher then normal. As he has stretched more it has loosened up. Has also been doing more yoga, except for extension based stretches, this has been helping. Hasn't been the most diligent with his PT exercises.  .  Current pain level is 2/10.     Previous pain level was  3/10.   Changes in function:  Yes (See Goal flowsheet attached for changes in current functional level)  Adverse reaction to treatment or activity: None    OBJECTIVE  Changes noted in objective findings: AROM Lumbar Flx: floor, Ext: 100%, TrA Contraction: good -,  Strength Hip Abd B: +4/5, Hip Ext B: +4/5;     ASSESSMENT/PLAN  Diagnosis: L plantar fascitis   Updated problem list and treatment plan:   Pain - HEP  Decreased function - HEP  Decreased strength - HEP  Impaired muscle performance - HEP  STG/LTGs have been met or progress has been made towards goals:  Yes, please see goal flowsheet for most current information  Assessment of Progress: current status is unknown.    Last current status: Pt is progressing slower than anticipated   Self Management Plans:  HEP  I have re-evaluated this patient and find that the nature, scope, duration and intensity of the therapy is appropriate for the medical condition of the patient.  Phillip continues to require the following intervention to meet STG and LTG's:  HEP.    Recommendations:  Discharge with current home program.  Patient to follow up with MD as needed.    Please  refer to the daily flowsheet for treatment today, total treatment time and time spent performing 1:1 timed codes.

## 2022-09-07 ENCOUNTER — DOCUMENTATION ONLY (OUTPATIENT)
Dept: OTHER | Facility: CLINIC | Age: 57
End: 2022-09-07

## 2022-10-07 ENCOUNTER — TELEPHONE (OUTPATIENT)
Dept: GASTROENTEROLOGY | Facility: CLINIC | Age: 57
End: 2022-10-07

## 2022-10-07 NOTE — TELEPHONE ENCOUNTER
"Caller: Lissett (CRSAL)    Procedure: Colon    Reason for call (please be detailed, any staff messages or encounters to note?): Schedule Colonoscopy Procedure    Scheduled: Y     If rescheduled:    Date: 12/13   Location: [facility] Sent: No    Covid Test Scheduled: No   Note any change or update to original order/sedation: Has latex allergy (also noted in \"patient needs\" in the case on the snapboard)                "

## 2022-10-10 ENCOUNTER — HEALTH MAINTENANCE LETTER (OUTPATIENT)
Age: 57
End: 2022-10-10

## 2022-12-12 ENCOUNTER — TELEPHONE (OUTPATIENT)
Dept: GASTROENTEROLOGY | Facility: CLINIC | Age: 57
End: 2022-12-12

## 2022-12-12 NOTE — TELEPHONE ENCOUNTER
Caller:   Reason for Reschedule/Cancellation (please be detailed, any staff messages or encounters to note?): covid+      Prior to reschedule please review:    Ordering Provider:    Sedation per order:CS    Does patient have any ASC Exclusions, please identify?:       Notes on Cancelled Procedure:    Procedure:Lower Endoscopy [Colonoscopy]     Date: 12/13    Location:[facility]; [address] [place] [address]    Surgeon: IKER        Rescheduled: No SENT TO COL/REC TO RESCHEDULE    Procedure:      Date:     Location:    Surgeon:     Sedation Level Scheduled  ,  Reason for Sedation Level     Prep/Instructions updated and sent:

## 2022-12-12 NOTE — TELEPHONE ENCOUNTER
CALLER NAME:  HAYDEN  NUMBER TO REACH CALLER: [phone]       Rescheduled:     Procedure:  COLONOSCOPY    Date: 01/30/2023    Location:[facility]    Surgeon: IKER

## 2022-12-19 ENCOUNTER — NURSE TRIAGE (OUTPATIENT)
Dept: PEDIATRICS | Facility: CLINIC | Age: 57
End: 2022-12-19

## 2022-12-19 ENCOUNTER — ANCILLARY PROCEDURE (OUTPATIENT)
Dept: GENERAL RADIOLOGY | Facility: CLINIC | Age: 57
End: 2022-12-19
Attending: PHYSICIAN ASSISTANT
Payer: COMMERCIAL

## 2022-12-19 ENCOUNTER — OFFICE VISIT (OUTPATIENT)
Dept: URGENT CARE | Facility: URGENT CARE | Age: 57
End: 2022-12-19
Payer: COMMERCIAL

## 2022-12-19 VITALS
BODY MASS INDEX: 32.04 KG/M2 | TEMPERATURE: 97.8 F | HEART RATE: 74 BPM | RESPIRATION RATE: 22 BRPM | OXYGEN SATURATION: 97 % | WEIGHT: 209 LBS | DIASTOLIC BLOOD PRESSURE: 87 MMHG | SYSTOLIC BLOOD PRESSURE: 133 MMHG

## 2022-12-19 DIAGNOSIS — H65.193 ACUTE MUCOID OTITIS MEDIA OF BOTH EARS: ICD-10-CM

## 2022-12-19 DIAGNOSIS — U07.1 INFECTION DUE TO 2019 NOVEL CORONAVIRUS: ICD-10-CM

## 2022-12-19 DIAGNOSIS — U07.1 INFECTION DUE TO 2019 NOVEL CORONAVIRUS: Primary | ICD-10-CM

## 2022-12-19 PROCEDURE — 99214 OFFICE O/P EST MOD 30 MIN: CPT | Mod: CS | Performed by: PHYSICIAN ASSISTANT

## 2022-12-19 PROCEDURE — 71046 X-RAY EXAM CHEST 2 VIEWS: CPT | Mod: TC | Performed by: RADIOLOGY

## 2022-12-19 RX ORDER — PREDNISONE 20 MG/1
40 TABLET ORAL DAILY
Qty: 10 TABLET | Refills: 0 | Status: SHIPPED | OUTPATIENT
Start: 2022-12-19 | End: 2022-12-24

## 2022-12-19 RX ORDER — CEFDINIR 300 MG/1
300 CAPSULE ORAL 2 TIMES DAILY
Qty: 20 CAPSULE | Refills: 0 | Status: SHIPPED | OUTPATIENT
Start: 2022-12-19 | End: 2022-12-29

## 2022-12-19 RX ORDER — ALBUTEROL SULFATE 90 UG/1
2-4 AEROSOL, METERED RESPIRATORY (INHALATION) EVERY 4 HOURS PRN
Qty: 18 G | Refills: 0 | Status: SHIPPED | OUTPATIENT
Start: 2022-12-19 | End: 2023-01-11

## 2022-12-19 NOTE — PROGRESS NOTES
SUBJECTIVE:  Phillip Nassar is a 57 year old male who comes in with continued chest congestion and productive cough and still having slight shortness of breath.  Patient states that he is 9 days into testing positive for COVID.  He was vaccinated.  He was past timeframe and did not receive any treatment.  Fevers have been resolved for the past several days.  Denies any cardiac chest pain.  States that the shortness of breath and feeling like he is wheezing comes and go.  He does have a history of asthma primarily as a child.  He would like a refill of his inhaler.  Also does have some pressure in his ears bilaterally along with mild sinus pain pressure.  He has a history of sinus issues.  Has been using over-the-counter med for symptomatic relief.  He has no other associated symptoms and otherwise at baseline health    Past Medical History:   Diagnosis Date     Allergic rhinitis      Diverticulitis      Mild intermittent asthma      No current outpatient medications on file.     No current facility-administered medications for this visit.     Social History     Socioeconomic History     Marital status:      Spouse name: Not on file     Number of children: Not on file     Years of education: Not on file     Highest education level: Bachelor's degree (e.g., BA, AB, BS)   Occupational History     Not on file   Tobacco Use     Smoking status: Never     Smokeless tobacco: Never   Substance and Sexual Activity     Alcohol use: No     Alcohol/week: 0.0 standard drinks     Drug use: No     Sexual activity: Yes     Partners: Female   Other Topics Concern     Parent/sibling w/ CABG, MI or angioplasty before 65F 55M? No   Social History Narrative     Not on file     Social Determinants of Health     Financial Resource Strain: Not on file   Food Insecurity: Not on file   Transportation Needs: Not on file   Physical Activity: Not on file   Stress: Not on file   Social Connections: Not on file   Intimate Partner Violence:  Not on file   Housing Stability: Not on file     ROS  Negative other than stated above    Exam:  GENERAL APPEARANCE: healthy, alert and no distress  EYES: EOMI,  PERRL  HENT: TMs erythematous bilateral with mucoid effusion noted.  Oral mucosa moist with no erythema or exudate noted.  Mild nasal congestion but no significant sinus pain or pressure  NECK: no adenopathy, no asymmetry, masses, or scars and thyroid normal to palpation  RESP: Harsh cough noted.  Scattered rhonchi and late expiratory wheezing noted.  Normal effort.  CV: regular rates and rhythm, normal S1 S2, no S3 or S4 and no murmur, click or rub   SKIN: no suspicious lesions or rashes    Chest x-ray with no infiltrate noted.  Heart side possible borderline with results pending from radiology    assessment/plan:  (U07.1) Infection due to 2019 novel coronavirus  (primary encounter diagnosis)  Comment:   Plan: XR Chest 2 Views, albuterol (PROAIR         HFA/PROVENTIL HFA/VENTOLIN HFA) 108 (90 Base)         MCG/ACT inhaler, predniSONE (DELTASONE) 20 MG         tablet        Patient with 9-day history of URI related symptoms in setting of positive COVID test.  Chest x-ray with no COVID lungs or infiltrate noted.  Does have harsh cough scattered rhonchi only wheezing noted.  We will give prednisone as directed and albuterol as needed.  Red flag signs were discussed.  May continue with over-the-counter meds for symptomatic relief.    (H66.113) Acute mucoid otitis media of both ears  Comment:   Plan: cefdinir (OMNICEF) 300 MG capsule        Patient with bilateral mucoid effusion noted.  Omnicef as directed.  Over-the-counter med as needed for symptomatic relief and follow-up with primary as needed

## 2022-12-19 NOTE — TELEPHONE ENCOUNTER
Spoke with patient.  Has been fighting covid for about 8 days.  Lungs feel tight and congestion.  Hx of childhood asthma. Possible wheeze.  Advised he should be evaluated in .   He agrees and will go in near him.    Yandel Otoole, NETON, RN, PHN  [facility] ~ Registered Nurse  Clinic Triage ~ [facility]  December 19, 2022      Reason for Disposition    Wheezing is present    Additional Information    Negative: Bluish (or gray) lips or face    Negative: SEVERE difficulty breathing (e.g., struggling for each breath, speaks in single words)    Negative: Rapid onset of cough and has hives    Negative: Coughing started suddenly after medicine, an allergic food or bee sting    Negative: Difficulty breathing after exposure to flames, smoke, or fumes    Negative: Sounds like a life-threatening emergency to the triager    Negative: Previous asthma attacks and this feels like asthma attack    Negative: Dry cough (non-productive; no sputum or minimal clear sputum) and within 14 days of COVID-19 Exposure    Negative: Increasing ankle swelling    Negative: Fever > 100.0 F (37.8 C) and bedridden (e.g., nursing home patient, stroke, chronic illness, recovering from surgery)    Negative: Fever > 100.0 F (37.8 C) and has diabetes mellitus or a weak immune system (e.g., HIV positive, cancer chemotherapy, organ transplant, splenectomy, chronic steroids)    Negative: Fever > 101 F (38.3 C) and over 60 years of age    Negative: Fever > 103 F (39.4 C)    Negative: Coughed up > 1 tablespoon (15 ml) blood (Exception: Blood-tinged sputum.)    Negative: MILD difficulty breathing (e.g., minimal/no SOB at rest, SOB with walking, pulse <100) and still present when not coughing    Negative: Patient sounds very sick or weak to the triager    Negative: MODERATE difficulty breathing (e.g., speaks in phrases, SOB even at rest, pulse 100-120) and still present when not coughing    Negative: Chest pain present when not coughing     Negative: Passed out (i.e., fainted, collapsed and was not responding)    Protocols used: COUGH-A-OH

## 2023-01-10 SDOH — HEALTH STABILITY: PHYSICAL HEALTH: ON AVERAGE, HOW MANY DAYS PER WEEK DO YOU ENGAGE IN MODERATE TO STRENUOUS EXERCISE (LIKE A BRISK WALK)?: 1 DAY

## 2023-01-10 SDOH — ECONOMIC STABILITY: FOOD INSECURITY: WITHIN THE PAST 12 MONTHS, YOU WORRIED THAT YOUR FOOD WOULD RUN OUT BEFORE YOU GOT MONEY TO BUY MORE.: NEVER TRUE

## 2023-01-10 SDOH — ECONOMIC STABILITY: INCOME INSECURITY: IN THE LAST 12 MONTHS, WAS THERE A TIME WHEN YOU WERE NOT ABLE TO PAY THE MORTGAGE OR RENT ON TIME?: NO

## 2023-01-10 SDOH — ECONOMIC STABILITY: INCOME INSECURITY: HOW HARD IS IT FOR YOU TO PAY FOR THE VERY BASICS LIKE FOOD, HOUSING, MEDICAL CARE, AND HEATING?: NOT HARD AT ALL

## 2023-01-10 SDOH — HEALTH STABILITY: PHYSICAL HEALTH: ON AVERAGE, HOW MANY MINUTES DO YOU ENGAGE IN EXERCISE AT THIS LEVEL?: 30 MIN

## 2023-01-10 SDOH — ECONOMIC STABILITY: FOOD INSECURITY: WITHIN THE PAST 12 MONTHS, THE FOOD YOU BOUGHT JUST DIDN'T LAST AND YOU DIDN'T HAVE MONEY TO GET MORE.: NEVER TRUE

## 2023-01-10 ASSESSMENT — LIFESTYLE VARIABLES
HOW OFTEN DO YOU HAVE A DRINK CONTAINING ALCOHOL: MONTHLY OR LESS
HOW OFTEN DO YOU HAVE SIX OR MORE DRINKS ON ONE OCCASION: NEVER
SKIP TO QUESTIONS 9-10: 1
AUDIT-C TOTAL SCORE: 1
HOW MANY STANDARD DRINKS CONTAINING ALCOHOL DO YOU HAVE ON A TYPICAL DAY: 1 OR 2

## 2023-01-10 ASSESSMENT — ENCOUNTER SYMPTOMS
SORE THROAT: 0
COUGH: 1
ABDOMINAL PAIN: 1
PARESTHESIAS: 0
ARTHRALGIAS: 1
MYALGIAS: 0
JOINT SWELLING: 0
HEMATURIA: 0
EYE PAIN: 0
SHORTNESS OF BREATH: 1
WEAKNESS: 0
DIARRHEA: 0
PALPITATIONS: 0
HEARTBURN: 0
HEADACHES: 0
HEMATOCHEZIA: 0
DIZZINESS: 0
NAUSEA: 0
NERVOUS/ANXIOUS: 0
FREQUENCY: 1
CHILLS: 0
FEVER: 0
DYSURIA: 0
CONSTIPATION: 0

## 2023-01-10 ASSESSMENT — ASTHMA QUESTIONNAIRES
ACT_TOTALSCORE: 14
QUESTION_4 LAST FOUR WEEKS HOW OFTEN HAVE YOU USED YOUR RESCUE INHALER OR NEBULIZER MEDICATION (SUCH AS ALBUTEROL): ONE OR TWO TIMES PER DAY
QUESTION_1 LAST FOUR WEEKS HOW MUCH OF THE TIME DID YOUR ASTHMA KEEP YOU FROM GETTING AS MUCH DONE AT WORK, SCHOOL OR AT HOME: SOME OF THE TIME
QUESTION_3 LAST FOUR WEEKS HOW OFTEN DID YOUR ASTHMA SYMPTOMS (WHEEZING, COUGHING, SHORTNESS OF BREATH, CHEST TIGHTNESS OR PAIN) WAKE YOU UP AT NIGHT OR EARLIER THAN USUAL IN THE MORNING: NOT AT ALL
QUESTION_5 LAST FOUR WEEKS HOW WOULD YOU RATE YOUR ASTHMA CONTROL: SOMEWHAT CONTROLLED
QUESTION_2 LAST FOUR WEEKS HOW OFTEN HAVE YOU HAD SHORTNESS OF BREATH: MORE THAN ONCE A DAY

## 2023-01-10 ASSESSMENT — SOCIAL DETERMINANTS OF HEALTH (SDOH)
HOW OFTEN DO YOU ATTEND CHURCH OR RELIGIOUS SERVICES?: NEVER
IN A TYPICAL WEEK, HOW MANY TIMES DO YOU TALK ON THE PHONE WITH FAMILY, FRIENDS, OR NEIGHBORS?: ONCE A WEEK
HOW OFTEN DO YOU GET TOGETHER WITH FRIENDS OR RELATIVES?: NEVER
DO YOU BELONG TO ANY CLUBS OR ORGANIZATIONS SUCH AS CHURCH GROUPS UNIONS, FRATERNAL OR ATHLETIC GROUPS, OR SCHOOL GROUPS?: NO

## 2023-01-11 ENCOUNTER — OFFICE VISIT (OUTPATIENT)
Dept: PEDIATRICS | Facility: CLINIC | Age: 58
End: 2023-01-11
Payer: COMMERCIAL

## 2023-01-11 VITALS
BODY MASS INDEX: 32.22 KG/M2 | OXYGEN SATURATION: 95 % | DIASTOLIC BLOOD PRESSURE: 82 MMHG | HEART RATE: 76 BPM | HEIGHT: 68 IN | WEIGHT: 212.6 LBS | SYSTOLIC BLOOD PRESSURE: 124 MMHG | RESPIRATION RATE: 20 BRPM | TEMPERATURE: 98 F

## 2023-01-11 DIAGNOSIS — Z12.5 PROSTATE CANCER SCREENING: ICD-10-CM

## 2023-01-11 DIAGNOSIS — K40.90 UNILATERAL INGUINAL HERNIA WITHOUT OBSTRUCTION OR GANGRENE, RECURRENCE NOT SPECIFIED: ICD-10-CM

## 2023-01-11 DIAGNOSIS — J45.20 MILD INTERMITTENT ASTHMA WITHOUT COMPLICATION: ICD-10-CM

## 2023-01-11 DIAGNOSIS — M25.562 CHRONIC PAIN OF LEFT KNEE: ICD-10-CM

## 2023-01-11 DIAGNOSIS — U07.1 INFECTION DUE TO 2019 NOVEL CORONAVIRUS: ICD-10-CM

## 2023-01-11 DIAGNOSIS — Z13.6 CARDIOVASCULAR SCREENING; LDL GOAL LESS THAN 160: ICD-10-CM

## 2023-01-11 DIAGNOSIS — Z12.11 SCREEN FOR COLON CANCER: ICD-10-CM

## 2023-01-11 DIAGNOSIS — G89.29 CHRONIC PAIN OF LEFT KNEE: ICD-10-CM

## 2023-01-11 DIAGNOSIS — Z00.00 ROUTINE GENERAL MEDICAL EXAMINATION AT A HEALTH CARE FACILITY: Primary | ICD-10-CM

## 2023-01-11 DIAGNOSIS — N52.9 ERECTILE DYSFUNCTION, UNSPECIFIED ERECTILE DYSFUNCTION TYPE: ICD-10-CM

## 2023-01-11 LAB
ALBUMIN SERPL BCG-MCNC: 4.5 G/DL (ref 3.5–5.2)
ALP SERPL-CCNC: 60 U/L (ref 40–129)
ALT SERPL W P-5'-P-CCNC: 30 U/L (ref 10–50)
ANION GAP SERPL CALCULATED.3IONS-SCNC: 14 MMOL/L (ref 7–15)
AST SERPL W P-5'-P-CCNC: 24 U/L (ref 10–50)
BILIRUB SERPL-MCNC: 0.5 MG/DL
BUN SERPL-MCNC: 11 MG/DL (ref 6–20)
CALCIUM SERPL-MCNC: 9.3 MG/DL (ref 8.6–10)
CHLORIDE SERPL-SCNC: 103 MMOL/L (ref 98–107)
CHOLEST SERPL-MCNC: 195 MG/DL
CREAT SERPL-MCNC: 0.88 MG/DL (ref 0.67–1.17)
DEPRECATED HCO3 PLAS-SCNC: 23 MMOL/L (ref 22–29)
GFR SERPL CREATININE-BSD FRML MDRD: >90 ML/MIN/1.73M2
GLUCOSE SERPL-MCNC: 85 MG/DL (ref 70–99)
HDLC SERPL-MCNC: 41 MG/DL
LDLC SERPL CALC-MCNC: 130 MG/DL
NONHDLC SERPL-MCNC: 154 MG/DL
POTASSIUM SERPL-SCNC: 4.5 MMOL/L (ref 3.4–5.3)
PROT SERPL-MCNC: 6.8 G/DL (ref 6.4–8.3)
PSA SERPL-MCNC: 1.34 NG/ML (ref 0–3.5)
SODIUM SERPL-SCNC: 140 MMOL/L (ref 136–145)
TRIGL SERPL-MCNC: 120 MG/DL

## 2023-01-11 PROCEDURE — 36415 COLL VENOUS BLD VENIPUNCTURE: CPT | Performed by: INTERNAL MEDICINE

## 2023-01-11 PROCEDURE — 99396 PREV VISIT EST AGE 40-64: CPT | Performed by: INTERNAL MEDICINE

## 2023-01-11 PROCEDURE — 99214 OFFICE O/P EST MOD 30 MIN: CPT | Mod: CS | Performed by: INTERNAL MEDICINE

## 2023-01-11 PROCEDURE — 80061 LIPID PANEL: CPT | Performed by: INTERNAL MEDICINE

## 2023-01-11 PROCEDURE — G0103 PSA SCREENING: HCPCS | Performed by: INTERNAL MEDICINE

## 2023-01-11 PROCEDURE — 80053 COMPREHEN METABOLIC PANEL: CPT | Performed by: INTERNAL MEDICINE

## 2023-01-11 RX ORDER — SILDENAFIL 100 MG/1
TABLET, FILM COATED ORAL
Qty: 4 TABLET | Refills: 4 | Status: SHIPPED | OUTPATIENT
Start: 2023-01-11 | End: 2024-01-26

## 2023-01-11 RX ORDER — ALBUTEROL SULFATE 90 UG/1
2-4 AEROSOL, METERED RESPIRATORY (INHALATION) EVERY 4 HOURS PRN
Qty: 18 G | Refills: 3 | Status: ON HOLD | OUTPATIENT
Start: 2023-01-11

## 2023-01-11 ASSESSMENT — ENCOUNTER SYMPTOMS
DIZZINESS: 0
HEADACHES: 0
FEVER: 0
NERVOUS/ANXIOUS: 0
MYALGIAS: 0
DYSURIA: 0
NAUSEA: 0
HEMATURIA: 0
SORE THROAT: 0
EYE PAIN: 0
DIARRHEA: 0
FREQUENCY: 1
ABDOMINAL PAIN: 1
SHORTNESS OF BREATH: 1
CONSTIPATION: 0
HEARTBURN: 0
COUGH: 1
ARTHRALGIAS: 1
JOINT SWELLING: 0
WEAKNESS: 0
HEMATOCHEZIA: 0
PARESTHESIAS: 0
PALPITATIONS: 0
CHILLS: 0

## 2023-01-11 ASSESSMENT — PAIN SCALES - GENERAL: PAINLEVEL: NO PAIN (0)

## 2023-01-11 NOTE — PROGRESS NOTES
SUBJECTIVE:   CC: Phillip is an 57 year old who presents for preventative health visit.   Patient has been advised of split billing requirements and indicates understanding: Yes  Healthy Habits:     Getting at least 3 servings of Calcium per day:  Yes    Bi-annual eye exam:  Yes    Dental care twice a year:  Yes    Sleep apnea or symptoms of sleep apnea:  Daytime drowsiness and Excessive snoring    Diet:  Regular (no restrictions)    Frequency of exercise:  1 day/week    Duration of exercise:  30-45 minutes    Taking medications regularly:  Yes    Barriers to taking medications:  None    Medication side effects:  Not applicable    PHQ-2 Total Score: 1    Additional concerns today:  Yes    pot here for routine health maintenance exam, did have covid back in December, feels better but still some am cough, slowing improving. almost resolved. in the evening note some asthma symptoms and the covid and has been using his albuterol. declines covid booster, plans to get shingles vaccine at some point.     mom passed away this year, had some memory issues, brother does as well, may have had a stroke. wondering about diet, etc.     hernia notes on CT scan, would like surgery consult to see if needs repair or not    L knee pain, affects his ability to do yoga, etc. wondering about next step, has had for years, really since knee surgery.     mood is good. No other concerns or complaints today.         Today's PHQ-2 Score: 1  PHQ-2 ( 1999 Pfizer) 1/10/2023   Q1: Little interest or pleasure in doing things 1   Q2: Feeling down, depressed or hopeless 0   PHQ-2 Score 1   PHQ-2 Total Score (12-17 Years)- Positive if 3 or more points; Administer PHQ-A if positive -   Q1: Little interest or pleasure in doing things Several days   Q2: Feeling down, depressed or hopeless Not at all   PHQ-2 Score 1           Social History     Tobacco Use     Smoking status: Never     Smokeless tobacco: Never   Substance Use Topics     Alcohol use: No      Alcohol/week: 0.0 standard drinks     If you drink alcohol do you typically have >3 drinks per day or >7 drinks per week? No    No flowsheet data found.    Last PSA:   PSA   Date Value Ref Range Status   12/10/2019 0.74 0 - 4 ug/L Final     Comment:     Assay Method:  Chemiluminescence using Siemens Vista analyzer     Prostate Specific Antigen Screen   Date Value Ref Range Status   07/14/2021 0.49 ug/L Final       Reviewed orders with patient. Reviewed health maintenance and updated orders accordingly - Yes  BP Readings from Last 3 Encounters:   01/11/23 124/82   12/19/22 133/87   10/28/21 (!) 142/93    Wt Readings from Last 3 Encounters:   01/11/23 96.4 kg (212 lb 9.6 oz)   12/19/22 94.8 kg (209 lb)   07/14/21 98.4 kg (216 lb 14.4 oz)                    Reviewed and updated as needed this visit by clinical staff   Tobacco  Allergies  Meds              Reviewed and updated as needed this visit by Provider                     Review of Systems   Constitutional: Negative for chills and fever.   HENT: Positive for congestion. Negative for ear pain, hearing loss and sore throat.    Eyes: Negative for pain and visual disturbance.   Respiratory: Positive for cough and shortness of breath.    Cardiovascular: Negative for chest pain, palpitations and peripheral edema.   Gastrointestinal: Positive for abdominal pain. Negative for constipation, diarrhea, heartburn, hematochezia and nausea.   Genitourinary: Positive for frequency and impotence. Negative for dysuria, genital sores, hematuria, penile discharge and urgency.   Musculoskeletal: Positive for arthralgias. Negative for joint swelling and myalgias.   Skin: Negative for rash.   Neurological: Negative for dizziness, weakness, headaches and paresthesias.   Psychiatric/Behavioral: Negative for mood changes. The patient is not nervous/anxious.      Reviwed above, all are stable/chronic and/or patient doesn't want to address at physical today unless noted in A/P.  "    OBJECTIVE:   /82   Pulse 76   Temp 98  F (36.7  C) (Oral)   Resp 20   Ht 1.727 m (5' 8\")   Wt 96.4 kg (212 lb 9.6 oz)   SpO2 95%   BMI 32.33 kg/m      Physical Exam  GENERAL: healthy, alert and no distress  EYES: Eyes grossly normal to inspection, PERRL and conjunctivae and sclerae normal  HENT: ear canals and TM's normal, mouth and nose covered with mask due to covid   NECK: no adenopathy, no asymmetry  RESP: lungs clear to auscultation - no rales, rhonchi or wheezes  CV: regular rate and rhythm, normal S1 S2, no S3 or S4, no murmur, click or rub, no peripheral edema   ABDOMEN: soft, nontender, no hepatosplenomegaly, no masses and bowel sounds normal  MS: no gross musculoskeletal defects noted, no edema  SKIN: no suspicious lesions or rashes  NEURO: Normal strength and tone, mentation intact and speech normal  PSYCH: mentation appears normal, affect normal/bright       ASSESSMENT/PLAN:   (Z00.00) Routine general medical examination at a health care facility  (primary encounter diagnosis)  Comment: d/w pt preventative care measures including seat belt use, bike helmet, moderation of EtOH, avoiding tobacco, avoiding excessive sun exposure/sunscreen, wt management or wt loss if BMI > 30, need to screen for lipid disorders, mood disorders, CAD risk factors, etc. Also discussed accident prevention and future RHM schedule.   Plan: Lipid panel reflex to direct LDL Fasting,         Comprehensive metabolic panel (BMP + Alb, Alk         Phos, ALT, AST, Total. Bili, TP), PSA, screen            (J45.20) Mild intermittent asthma without complication  Comment: discussed with patient (or patient's parents/caregiver) pathophysiology of condition and treatment options.  worse since Covid but getting better, reviwed action plan, etc.   Plan: Asthma Action Plan (AAP)            (U07.1) Infection due to 2019 novel coronavirus  Comment: as above, doing better, reviwed supportive cares. declines booster today.   Plan: " albuterol (PROAIR HFA/PROVENTIL HFA/VENTOLIN         HFA) 108 (90 Base) MCG/ACT inhaler            (M25.562,  G89.29) Chronic pain of left knee  Comment: discussed with patient (or patient's parents/caregiver) pathophysiology of condition and treatment options.  history consistant with like patellar tendon issue, will refer to physical therapy for evaluation. consider sports med vs ortho referral if persists after physical therapy evaluation. Patient verbalized understanding and is agreeable to this plan.   Plan: Physical Therapy Referral            (K40.90) Unilateral inguinal hernia without obstruction or gangrene, recurrence not specified  Comment: discussed with patient (or patient's parents/caregiver) pathophysiology of condition and treatment options.  reviwed CT scan, will refer to surgery for consult. Also d/w pt signs/symptoms of worsening of condition and need for urgent ED evaluation. Patient verbalized understanding and is agreeable to this plan.    Plan: Adult General Surg Referral            (Z12.11) Screen for colon cancer  Comment: patient has colonosocpy scheduled    (Z13.6) CARDIOVASCULAR SCREENING; LDL GOAL LESS THAN 160  Comment: due for labs  Plan: Lipid panel reflex to direct LDL Fasting        =    (Z12.5) Prostate cancer screening  Plan: PSA, screen            (N52.9) Erectile dysfunction, unspecified erectile dysfunction type  Comment: Well controlled on current medication(s).   Plan: sildenafil (VIAGRA) 100 MG tablet              Patient has been advised of split billing requirements and indicates understanding: Yes      COUNSELING:   Reviewed preventive health counseling, as reflected in patient instructions        He reports that he has never smoked. He has never used smokeless tobacco.      I have discussed with patient the risks, benefits, medications, treatment options and modalities.   I have instructed the patient to call or schedule a follow-up appointment if any problems or failure  to improve.       Albert Chapman MD  [facility]

## 2023-01-12 ASSESSMENT — ASTHMA QUESTIONNAIRES: ACT_TOTALSCORE: 20

## 2023-01-27 ENCOUNTER — THERAPY VISIT (OUTPATIENT)
Dept: PHYSICAL THERAPY | Facility: CLINIC | Age: 58
End: 2023-01-27
Attending: INTERNAL MEDICINE
Payer: COMMERCIAL

## 2023-01-27 DIAGNOSIS — G89.29 CHRONIC PAIN OF LEFT KNEE: ICD-10-CM

## 2023-01-27 DIAGNOSIS — M25.562 CHRONIC PAIN OF LEFT KNEE: ICD-10-CM

## 2023-01-27 PROCEDURE — 97110 THERAPEUTIC EXERCISES: CPT | Mod: GP | Performed by: PHYSICAL THERAPIST

## 2023-01-27 PROCEDURE — 97161 PT EVAL LOW COMPLEX 20 MIN: CPT | Mod: GP | Performed by: PHYSICAL THERAPIST

## 2023-01-27 PROCEDURE — 97140 MANUAL THERAPY 1/> REGIONS: CPT | Mod: GP | Performed by: PHYSICAL THERAPIST

## 2023-01-27 NOTE — PROGRESS NOTES
Physical Therapy Initial Evaluation  Subjective:  The history is provided by the patient. No  was used.   Patient Health History  Phillip Nassar being seen for Lt. knee.     Problem began: 1/22/2000.   Problem occurred: Dislocated knee     General health as reported by patient is good.  Pertinent medical history includes: none.     Medical allergies: none.   Surgeries include:  Orthopedic surgery.    Current medications:  None.    Current occupation is [employer] tech.   Primary job tasks include:  Lifting/carrying, prolonged sitting, prolonged standing, pushing/pulling and repetitive tasks.                  Therapist Generated HPI Evaluation  Problem details: Pt describes that several years ago he rolled a 4 alfred and dislocated his L knee, in the ER it was reduced with pain meds.  Pt then went on to have an ACL reconstruction.  Pt did well until around 10 years ago and he overstretched it and eventually he needed another ACL repair, did well until a couple years later he hopped off of a 1 foot drop and injured his L meniscus.  Pt then had a surgery for that as well and recovered well.  Pt has now noticed that if he relaxes his knee it will almost fall out of position and is really painful until he relocates it. .         Type of problem:  Left knee.          Site of Pain: whole knee.  Pain is described as aching and is intermittent.  Pain radiates to:  Knee.   Since onset symptoms are unchanged.  Associated symptoms:  Buckling/giving out and loss of motion/stiffness. Symptoms are exacerbated by bending/squatting (knee)        Restrictions due to condition include:  Working in normal job without restrictions.  Barriers include:  None as reported by patient.                        Objective:  Standing Alignment:              Knee:  Genu varus L      Gait:    Gait Type:  Normal                                                           Knee Evaluation:  ROM:    AROM      Extension:  Left: -3    Right:   0  Flexion: Left: 105 erp posterior knee    Right: wnl        Strength:     Extension:  Left: 5-/5   Pain:      Right: 5/5   Pain:    Quad Set Left: Fair    Pain:   Quad Set Right: Good    Pain:  Ligament Testing:  Normal                    Edema:  Normal    Mobility Testing:      Patellofemoral Medial:  Left: hypomobile      Patellofemoral Lateral:  Left: hypomobile      Patellofemoral Superior:  Left: hypomobile      Patellofemoral Inferior:  Left: hypomobile            General     ROS    Assessment/Plan:    Patient is a 57 year old male with left side knee complaints.    Patient has the following significant findings with corresponding treatment plan.                Diagnosis 1:  L knee pain      Pain -  hot/cold therapy, manual therapy, self management, education and home program  Decreased ROM/flexibility - manual therapy and therapeutic exercise  Decreased strength - therapeutic exercise and therapeutic activities  Impaired muscle performance - neuro re-education  Decreased function - therapeutic activities    Therapy Evaluation Codes:   1) History comprised of:   Personal factors that impact the plan of care:      Time since onset of symptoms.    Comorbidity factors that impact the plan of care are:      None.     Medications impacting care: None.  2) Examination of Body Systems comprised of:   Body structures and functions that impact the plan of care:      Knee.   Activity limitations that impact the plan of care are:      Squatting/kneeling, Stairs and Standing.  3) Clinical presentation characteristics are:   Stable/Uncomplicated.  4) Decision-Making    Low complexity using standardized patient assessment instrument and/or measureable assessment of functional outcome.  Cumulative Therapy Evaluation is: Low complexity.    Previous and current functional limitations:  (See Goal Flow Sheet for this information)    Short term and Long term goals: (See Goal Flow Sheet for this information)     Communication  ability:  Patient appears to be able to clearly communicate and understand verbal and written communication and follow directions correctly.  Treatment Explanation - The following has been discussed with the patient:   RX ordered/plan of care  Anticipated outcomes  Possible risks and side effects  This patient would benefit from PT intervention to resume normal activities.   Rehab potential is good.    Frequency:  1 X week, once daily  Duration:  for 8 weeks  Discharge Plan:  Achieve all LTG.  Independent in home treatment program.  Reach maximal therapeutic benefit.    Please refer to the daily flowsheet for treatment today, total treatment time and time spent performing 1:1 timed codes.

## 2023-01-30 ENCOUNTER — HOSPITAL ENCOUNTER (OUTPATIENT)
Facility: CLINIC | Age: 58
Discharge: HOME | End: 2023-01-30
Attending: COLON & RECTAL SURGERY | Admitting: COLON & RECTAL SURGERY
Payer: COMMERCIAL

## 2023-01-30 VITALS
WEIGHT: 212.6 LBS | DIASTOLIC BLOOD PRESSURE: 89 MMHG | RESPIRATION RATE: 13 BRPM | BODY MASS INDEX: 32.22 KG/M2 | OXYGEN SATURATION: 95 % | TEMPERATURE: 97.3 F | SYSTOLIC BLOOD PRESSURE: 133 MMHG | HEART RATE: 74 BPM | HEIGHT: 68 IN

## 2023-01-30 LAB — COLONOSCOPY: NORMAL

## 2023-01-30 PROCEDURE — G0500 MOD SEDAT ENDO SERVICE >5YRS: HCPCS | Performed by: COLON & RECTAL SURGERY

## 2023-01-30 PROCEDURE — 45378 DIAGNOSTIC COLONOSCOPY: CPT | Performed by: COLON & RECTAL SURGERY

## 2023-01-30 PROCEDURE — G0121 COLON CA SCRN NOT HI RSK IND: HCPCS | Performed by: COLON & RECTAL SURGERY

## 2023-01-30 PROCEDURE — 250N000011 HC RX IP 250 OP 636: Performed by: COLON & RECTAL SURGERY

## 2023-01-30 RX ORDER — NALOXONE HYDROCHLORIDE 0.4 MG/ML
0.2 INJECTION, SOLUTION INTRAMUSCULAR; INTRAVENOUS; SUBCUTANEOUS
Status: DISCONTINUED | OUTPATIENT
Start: 2023-01-30 | End: 2023-01-30 | Stop reason: HOSPADM

## 2023-01-30 RX ORDER — ONDANSETRON 4 MG/1
4 TABLET, ORALLY DISINTEGRATING ORAL EVERY 6 HOURS PRN
Status: DISCONTINUED | OUTPATIENT
Start: 2023-01-30 | End: 2023-01-30 | Stop reason: HOSPADM

## 2023-01-30 RX ORDER — NALOXONE HYDROCHLORIDE 0.4 MG/ML
0.4 INJECTION, SOLUTION INTRAMUSCULAR; INTRAVENOUS; SUBCUTANEOUS
Status: DISCONTINUED | OUTPATIENT
Start: 2023-01-30 | End: 2023-01-30 | Stop reason: HOSPADM

## 2023-01-30 RX ORDER — PROCHLORPERAZINE MALEATE 10 MG
10 TABLET ORAL EVERY 6 HOURS PRN
Status: DISCONTINUED | OUTPATIENT
Start: 2023-01-30 | End: 2023-01-30 | Stop reason: HOSPADM

## 2023-01-30 RX ORDER — ONDANSETRON 2 MG/ML
4 INJECTION INTRAMUSCULAR; INTRAVENOUS EVERY 6 HOURS PRN
Status: DISCONTINUED | OUTPATIENT
Start: 2023-01-30 | End: 2023-01-30 | Stop reason: HOSPADM

## 2023-01-30 RX ORDER — DIPHENHYDRAMINE HYDROCHLORIDE 50 MG/ML
25-50 INJECTION INTRAMUSCULAR; INTRAVENOUS
Status: DISCONTINUED | OUTPATIENT
Start: 2023-01-30 | End: 2023-01-30 | Stop reason: HOSPADM

## 2023-01-30 RX ORDER — FLUMAZENIL 0.1 MG/ML
0.2 INJECTION, SOLUTION INTRAVENOUS
Status: DISCONTINUED | OUTPATIENT
Start: 2023-01-30 | End: 2023-01-30 | Stop reason: HOSPADM

## 2023-01-30 RX ORDER — SIMETHICONE 40MG/0.6ML
133 SUSPENSION, DROPS(FINAL DOSAGE FORM)(ML) ORAL
Status: DISCONTINUED | OUTPATIENT
Start: 2023-01-30 | End: 2023-01-30 | Stop reason: HOSPADM

## 2023-01-30 RX ORDER — EPINEPHRINE 1 MG/ML
0.1 INJECTION, SOLUTION INTRAMUSCULAR; SUBCUTANEOUS
Status: DISCONTINUED | OUTPATIENT
Start: 2023-01-30 | End: 2023-01-30 | Stop reason: HOSPADM

## 2023-01-30 RX ORDER — ATROPINE SULFATE 0.1 MG/ML
1 INJECTION INTRAVENOUS
Status: DISCONTINUED | OUTPATIENT
Start: 2023-01-30 | End: 2023-01-30 | Stop reason: HOSPADM

## 2023-01-30 RX ORDER — LIDOCAINE 40 MG/G
CREAM TOPICAL
Status: DISCONTINUED | OUTPATIENT
Start: 2023-01-30 | End: 2023-01-30 | Stop reason: HOSPADM

## 2023-01-30 RX ORDER — FENTANYL CITRATE 50 UG/ML
50-100 INJECTION, SOLUTION INTRAMUSCULAR; INTRAVENOUS EVERY 5 MIN PRN
Status: DISCONTINUED | OUTPATIENT
Start: 2023-01-30 | End: 2023-01-30 | Stop reason: HOSPADM

## 2023-01-30 RX ORDER — ONDANSETRON 2 MG/ML
4 INJECTION INTRAMUSCULAR; INTRAVENOUS
Status: DISCONTINUED | OUTPATIENT
Start: 2023-01-30 | End: 2023-01-30 | Stop reason: HOSPADM

## 2023-01-30 RX ADMIN — FENTANYL CITRATE 100 MCG: 50 INJECTION, SOLUTION INTRAMUSCULAR; INTRAVENOUS at 07:40

## 2023-01-30 RX ADMIN — MIDAZOLAM 2 MG: 1 INJECTION INTRAMUSCULAR; INTRAVENOUS at 07:40

## 2023-01-30 ASSESSMENT — ACTIVITIES OF DAILY LIVING (ADL)
SIT WITH YOUR KNEE BENT: ACTIVITY IS NOT DIFFICULT
WALK: ACTIVITY IS NOT DIFFICULT
RISE FROM A CHAIR: ACTIVITY IS NOT DIFFICULT
STIFFNESS: I HAVE THE SYMPTOM BUT IT DOES NOT AFFECT MY ACTIVITY
SQUAT: ACTIVITY IS SOMEWHAT DIFFICULT
SWELLING: I HAVE THE SYMPTOM BUT IT DOES NOT AFFECT MY ACTIVITY
HOW_WOULD_YOU_RATE_THE_CURRENT_FUNCTION_OF_YOUR_KNEE_DURING_YOUR_USUAL_DAILY_ACTIVITIES_ON_A_SCALE_FROM_0_TO_100_WITH_100_BEING_YOUR_LEVEL_OF_KNEE_FUNCTION_PRIOR_TO_YOUR_INJURY_AND_0_BEING_THE_INABILITY_TO_PERFORM_ANY_OF_YOUR_USUAL_DAILY_ACTIVITIES?: 65
WEAKNESS: I HAVE THE SYMPTOM BUT IT DOES NOT AFFECT MY ACTIVITY
GO UP STAIRS: ACTIVITY IS NOT DIFFICULT
LIMPING: I HAVE THE SYMPTOM BUT IT DOES NOT AFFECT MY ACTIVITY
GO DOWN STAIRS: ACTIVITY IS NOT DIFFICULT
RAW_SCORE: 56
HOW_WOULD_YOU_RATE_THE_OVERALL_FUNCTION_OF_YOUR_KNEE_DURING_YOUR_USUAL_DAILY_ACTIVITIES?: NEARLY NORMAL
AS_A_RESULT_OF_YOUR_KNEE_INJURY,_HOW_WOULD_YOU_RATE_YOUR_CURRENT_LEVEL_OF_DAILY_ACTIVITY?: NEARLY NORMAL
GIVING WAY, BUCKLING OR SHIFTING OF KNEE: I HAVE THE SYMPTOM BUT IT DOES NOT AFFECT MY ACTIVITY
KNEE_ACTIVITY_OF_DAILY_LIVING_SCORE: 80
ADLS_ACUITY_SCORE: 35
STAND: ACTIVITY IS SOMEWHAT DIFFICULT
KNEE_ACTIVITY_OF_DAILY_LIVING_SUM: 56
KNEEL ON THE FRONT OF YOUR KNEE: ACTIVITY IS VERY DIFFICULT
PAIN: I HAVE THE SYMPTOM BUT IT DOES NOT AFFECT MY ACTIVITY

## 2023-01-30 NOTE — H&P
Pre-Endoscopy History and Physical     Phillip Nassar MRN# [id number]   YOB: 1965 Age: 57 year old     Date of Procedure: 1/30/2023  Primary care provider: Albert Chapman  Type of Endoscopy: colonoscopy  Reason for Procedure: family history  Type of Anesthesia Anticipated: Moderate Sedation    HPI:    Phillip is a 57 year old male who will be undergoing the above procedure.      A history and physical has been performed. The patient's medications and allergies have been reviewed. The risks and benefits of the procedure and the sedation options and risks were discussed with the patient.  All questions were answered and informed consent was obtained.      He denies a personal or family history of anesthesia complications or bleeding disorders.     Allergies   Allergen Reactions     No Known Drug Allergies         Prior to Admission Medications   Prescriptions Last Dose Informant Patient Reported? Taking?   albuterol (PROAIR HFA/PROVENTIL HFA/VENTOLIN HFA) 108 (90 Base) MCG/ACT inhaler   No No   Sig: Inhale 2-4 puffs into the lungs every 4 hours as needed for shortness of breath or wheezing   sildenafil (VIAGRA) 100 MG tablet   No No   Sig: take 1/2 to one tab daily as needed for erectile dysfunction. take 30 min to 4 hours before sexual activity      Facility-Administered Medications: None       Patient Active Problem List   Diagnosis     Allergic rhinitis     Diverticulitis of colon     CARDIOVASCULAR SCREENING; LDL GOAL LESS THAN 160     Intermittent asthma     SBO (small bowel obstruction) (H)     DJD of shoulder     Shoulder impingement syndrome     Acute diverticulitis     Chronic pain of left knee        Past Medical History:   Diagnosis Date     Allergic rhinitis      Diverticulitis      Mild intermittent asthma         Past Surgical History:   Procedure Laterality Date     ARTHROSCOPY KNEE       COLONOSCOPY  03/07/2014    Procedure: COLONOSCOPY;  Colonoscopy  ;  Surgeon: Gucci Martin MD;   "Location: [facility]     COLONOSCOPY N/A 02/28/2017    Procedure: COLONOSCOPY;  Surgeon: Destiny Burks MD;  Location: [facility]     SINUS SURGERY         Social History     Tobacco Use     Smoking status: Never     Smokeless tobacco: Never   Substance Use Topics     Alcohol use: No     Alcohol/week: 0.0 standard drinks       Family History   Problem Relation Age of Onset     Alzheimer Disease Mother      Diabetes Father      Colon Cancer Paternal Grandmother      Colon Cancer Paternal Aunt      Cancer - colorectal Paternal Aunt      Colon Cancer Paternal Uncle      Cancer - colorectal Paternal Uncle        REVIEW OF SYSTEMS:     5 point ROS negative except as noted above in HPI, including Gen., Resp., CV, GI &  system review.      PHYSICAL EXAM:   Temp 97.3  F (36.3  C) (Temporal)   Ht 1.727 m (5' 8\")   Wt 96.4 kg (212 lb 9.6 oz)   BMI 32.33 kg/m   Estimated body mass index is 32.33 kg/m  as calculated from the following:    Height as of this encounter: 1.727 m (5' 8\").    Weight as of this encounter: 96.4 kg (212 lb 9.6 oz).   GENERAL APPEARANCE: healthy and alert  MENTAL STATUS: alert  AIRWAY EXAM: Mallampatti Class II (visualization of the soft palate, fauces, and uvula)  RESP: lungs clear to auscultation - no rales, rhonchi or wheezes  CV: regular rates and rhythm      DIAGNOSTICS:    Not indicated      IMPRESSION   ASA Class 2 - Mild systemic disease        PLAN:       Plan for colonoscopy. We discussed the risks, benefits and alternatives and the patient wished to proceed.    The above has been forwarded to the consulting provider.      Signed Electronically by: Destiny Burks MD, MD  January 30, 2023    "

## 2023-02-03 ENCOUNTER — THERAPY VISIT (OUTPATIENT)
Dept: PHYSICAL THERAPY | Facility: CLINIC | Age: 58
End: 2023-02-03
Attending: INTERNAL MEDICINE
Payer: COMMERCIAL

## 2023-02-03 DIAGNOSIS — M25.562 CHRONIC PAIN OF LEFT KNEE: Primary | ICD-10-CM

## 2023-02-03 DIAGNOSIS — G89.29 CHRONIC PAIN OF LEFT KNEE: Primary | ICD-10-CM

## 2023-02-03 PROCEDURE — 97140 MANUAL THERAPY 1/> REGIONS: CPT | Mod: GP | Performed by: PHYSICAL THERAPIST

## 2023-02-03 PROCEDURE — 97110 THERAPEUTIC EXERCISES: CPT | Mod: GP | Performed by: PHYSICAL THERAPIST

## 2023-02-17 ENCOUNTER — THERAPY VISIT (OUTPATIENT)
Dept: PHYSICAL THERAPY | Facility: CLINIC | Age: 58
End: 2023-02-17
Payer: COMMERCIAL

## 2023-02-17 DIAGNOSIS — M25.562 CHRONIC PAIN OF LEFT KNEE: Primary | ICD-10-CM

## 2023-02-17 DIAGNOSIS — G89.29 CHRONIC PAIN OF LEFT KNEE: Primary | ICD-10-CM

## 2023-02-17 PROCEDURE — 97112 NEUROMUSCULAR REEDUCATION: CPT | Mod: GP | Performed by: PHYSICAL THERAPIST

## 2023-02-17 PROCEDURE — 97110 THERAPEUTIC EXERCISES: CPT | Mod: GP | Performed by: PHYSICAL THERAPIST

## 2023-03-08 ENCOUNTER — OFFICE VISIT (OUTPATIENT)
Dept: OPTOMETRY | Facility: CLINIC | Age: 58
End: 2023-03-08
Payer: COMMERCIAL

## 2023-03-08 DIAGNOSIS — H52.12 MYOPIA OF LEFT EYE WITH ASTIGMATISM: ICD-10-CM

## 2023-03-08 DIAGNOSIS — H04.129 DRY EYE: ICD-10-CM

## 2023-03-08 DIAGNOSIS — H52.4 PRESBYOPIA: Primary | ICD-10-CM

## 2023-03-08 DIAGNOSIS — H52.202 MYOPIA OF LEFT EYE WITH ASTIGMATISM: ICD-10-CM

## 2023-03-08 PROCEDURE — 92015 DETERMINE REFRACTIVE STATE: CPT | Performed by: OPTOMETRIST

## 2023-03-08 PROCEDURE — 92004 COMPRE OPH EXAM NEW PT 1/>: CPT | Performed by: OPTOMETRIST

## 2023-03-08 ASSESSMENT — REFRACTION_MANIFEST
OS_ADD: +2.00
OD_AXIS: 027
OD_SPHERE: PLANO
OD_SPHERE: -0.75
METHOD_AUTOREFRACTION: 1
OS_CYLINDER: +0.25
OD_ADD: +2.00
OS_SPHERE: -1.50
OS_SPHERE: -0.50
OS_AXIS: 010
OD_CYLINDER: +0.50
OS_CYLINDER: +0.75
OS_AXIS: 004

## 2023-03-08 ASSESSMENT — KERATOMETRY
OD_K1POWER_DIOPTERS: 40.62
OS_AXISANGLE2_DEGREES: 167
OS_K1POWER_DIOPTERS: 41.25
OS_AXISANGLE_DEGREES: 77
OD_AXISANGLE_DEGREES: 72
OD_AXISANGLE2_DEGREES: 162
OS_K2POWER_DIOPTERS: 41.62
OD_K2POWER_DIOPTERS: 42.12

## 2023-03-08 ASSESSMENT — VISUAL ACUITY
OD_SC: 20/20
OD_SC: 20/60
OS_SC: 20/40
METHOD: SNELLEN - LINEAR
OS_SC: 20/20

## 2023-03-08 ASSESSMENT — TONOMETRY
OS_IOP_MMHG: 15
OD_IOP_MMHG: 15
IOP_METHOD: APPLANATION

## 2023-03-08 ASSESSMENT — EXTERNAL EXAM - RIGHT EYE: OD_EXAM: NORMAL

## 2023-03-08 ASSESSMENT — SLIT LAMP EXAM - LIDS
COMMENTS: NORMAL
COMMENTS: NORMAL

## 2023-03-08 ASSESSMENT — CUP TO DISC RATIO
OS_RATIO: 0.2
OD_RATIO: 0.2

## 2023-03-08 ASSESSMENT — CONF VISUAL FIELD
OD_SUPERIOR_NASAL_RESTRICTION: 0
OS_SUPERIOR_NASAL_RESTRICTION: 0
OD_SUPERIOR_TEMPORAL_RESTRICTION: 0
OS_INFERIOR_TEMPORAL_RESTRICTION: 0
METHOD: COUNTING FINGERS
OD_INFERIOR_NASAL_RESTRICTION: 0
OS_INFERIOR_NASAL_RESTRICTION: 0
OD_INFERIOR_TEMPORAL_RESTRICTION: 0
OD_NORMAL: 1
OS_SUPERIOR_TEMPORAL_RESTRICTION: 0
OS_NORMAL: 1

## 2023-03-08 ASSESSMENT — EXTERNAL EXAM - LEFT EYE: OS_EXAM: NORMAL

## 2023-03-08 NOTE — PROGRESS NOTES
Chief Complaint   Patient presents with     Annual Eye Exam        Last Eye Exam: 2 years ago  Dilated Previously: Yes, side effects of dilation explained today    What are you currently using to see?  readers       Distance Vision Acuity: Satisfied with vision    Near Vision Acuity: Satisfied with vision while reading and using computer with readers    Eye Comfort: dry and watery  Do you use eye drops? : Yes: OTC artificial tears when he thinks of it      Maribell Cardozo - Optometric Assistant       [facility] tech at the      Medical, surgical and family histories reviewed and updated 3/8/2023.     S/p lasik      OBJECTIVE: See Ophthalmology exam    ASSESSMENT:    ICD-10-CM    1. Presbyopia  H52.4       2. Myopia of left eye with astigmatism  H52.12     H52.202       3. Dry eye  H04.129           PLAN:   Over the counter readers, artificial tears as needed       Trang Umana OD

## 2023-03-08 NOTE — LETTER
3/8/2023         RE: Phillip Nassar  [address]  [place] [address]        Dear Colleague,    Thank you for referring your patient, Phillip Nassar, to the [facility] [place]. Please see a copy of my visit note below.    Chief Complaint   Patient presents with     Annual Eye Exam        Last Eye Exam: 2 years ago  Dilated Previously: Yes, side effects of dilation explained today    What are you currently using to see?  readers       Distance Vision Acuity: Satisfied with vision    Near Vision Acuity: Satisfied with vision while reading and using computer with readers    Eye Comfort: dry and watery  Do you use eye drops? : Yes: OTC artificial tears when he thinks of it      Maribell Cardozo - Optometric Assistant       [facility] tech at the      Medical, surgical and family histories reviewed and updated 3/8/2023.     S/p lasik      OBJECTIVE: See Ophthalmology exam    ASSESSMENT:    ICD-10-CM    1. Presbyopia  H52.4       2. Myopia of left eye with astigmatism  H52.12     H52.202       3. Dry eye  H04.129           PLAN:   Over the counter readers, artificial tears as needed       Trang Umana OD         Again, thank you for allowing me to participate in the care of your patient.        Sincerely,        Trang Umana, OD

## 2023-03-08 NOTE — PATIENT INSTRUCTIONS
No prescription , readers as needed     I recommend using artificial tears for your dry eye.   There are over the counter drops that work well and may be used up to 4 x daily. ( blink, systane , refresh, thera tears)   If you need more than 4 drops daily, use a preservative free product which come in individual vials and may be used for 24 hours until finished and discarded.    Do not use red eye relief products such as visine or clear eyes for dry eye

## 2023-04-04 ENCOUNTER — THERAPY VISIT (OUTPATIENT)
Dept: PHYSICAL THERAPY | Facility: CLINIC | Age: 58
End: 2023-04-04
Payer: COMMERCIAL

## 2023-04-04 DIAGNOSIS — M25.562 CHRONIC PAIN OF LEFT KNEE: Primary | ICD-10-CM

## 2023-04-04 DIAGNOSIS — G89.29 CHRONIC PAIN OF LEFT KNEE: Primary | ICD-10-CM

## 2023-04-04 PROCEDURE — 97112 NEUROMUSCULAR REEDUCATION: CPT | Mod: GP | Performed by: PHYSICAL THERAPIST

## 2023-04-04 PROCEDURE — 97110 THERAPEUTIC EXERCISES: CPT | Mod: GP | Performed by: PHYSICAL THERAPIST

## 2023-04-04 ASSESSMENT — ACTIVITIES OF DAILY LIVING (ADL)
RISE FROM A CHAIR: ACTIVITY IS MINIMALLY DIFFICULT
WEAKNESS: I HAVE THE SYMPTOM BUT IT DOES NOT AFFECT MY ACTIVITY
KNEE_ACTIVITY_OF_DAILY_LIVING_SUM: 60
STAND: ACTIVITY IS NOT DIFFICULT
STIFFNESS: I HAVE THE SYMPTOM BUT IT DOES NOT AFFECT MY ACTIVITY
PAIN: I HAVE THE SYMPTOM BUT IT DOES NOT AFFECT MY ACTIVITY
SQUAT: ACTIVITY IS MINIMALLY DIFFICULT
WALK: ACTIVITY IS NOT DIFFICULT
RAW_SCORE: 60
GO UP STAIRS: ACTIVITY IS NOT DIFFICULT
SWELLING: I HAVE THE SYMPTOM BUT IT DOES NOT AFFECT MY ACTIVITY
LIMPING: I DO NOT HAVE THE SYMPTOM
KNEE_ACTIVITY_OF_DAILY_LIVING_SCORE: 85.71
SIT WITH YOUR KNEE BENT: ACTIVITY IS NOT DIFFICULT
GIVING WAY, BUCKLING OR SHIFTING OF KNEE: I HAVE THE SYMPTOM BUT IT DOES NOT AFFECT MY ACTIVITY
HOW_WOULD_YOU_RATE_THE_CURRENT_FUNCTION_OF_YOUR_KNEE_DURING_YOUR_USUAL_DAILY_ACTIVITIES_ON_A_SCALE_FROM_0_TO_100_WITH_100_BEING_YOUR_LEVEL_OF_KNEE_FUNCTION_PRIOR_TO_YOUR_INJURY_AND_0_BEING_THE_INABILITY_TO_PERFORM_ANY_OF_YOUR_USUAL_DAILY_ACTIVITIES?: 70
KNEEL ON THE FRONT OF YOUR KNEE: ACTIVITY IS FAIRLY DIFFICULT
GO DOWN STAIRS: ACTIVITY IS NOT DIFFICULT

## 2023-05-01 ENCOUNTER — TELEPHONE (OUTPATIENT)
Dept: PEDIATRICS | Facility: CLINIC | Age: 58
End: 2023-05-01

## 2023-05-01 ENCOUNTER — E-VISIT (OUTPATIENT)
Dept: PEDIATRICS | Facility: CLINIC | Age: 58
End: 2023-05-01
Payer: COMMERCIAL

## 2023-05-01 DIAGNOSIS — K57.32 DIVERTICULITIS OF COLON: Primary | ICD-10-CM

## 2023-05-01 PROCEDURE — 99422 OL DIG E/M SVC 11-20 MIN: CPT | Performed by: INTERNAL MEDICINE

## 2023-05-01 RX ORDER — AMOXICILLIN AND CLAVULANATE POTASSIUM 562.5; 437.5; 62.5 MG/1; MG/1; MG/1
2 TABLET, MULTILAYER, EXTENDED RELEASE ORAL 2 TIMES DAILY
Qty: 40 TABLET | Refills: 0 | Status: SHIPPED | OUTPATIENT
Start: 2023-05-01 | End: 2023-05-01

## 2023-05-01 NOTE — PATIENT INSTRUCTIONS
Thank you for choosing us for your care. I have placed an order for a prescription so that you can start treatment. View your full visit summary for details by clicking on the link below. Your pharmacist will able to address any questions you may have about the medication.     If you're not feeling better within 5-7 days, please schedule an appointment.  You can schedule an appointment right here in [facility], or call [phone]  If the visit is for the same symptoms as your eVisit, we'll refund the cost of your eVisit if seen within seven days.

## 2023-05-01 NOTE — TELEPHONE ENCOUNTER
General Call    Contacts       Type Contact Phone/Fax    05/01/2023 04:03 PM CDT Phone (Incoming) Phillip Nassar (Self) [phone] (M)        Reason for Call:  Medication given today, Augmentin    What are your questions or concerns:  Medication does not seem to be readily available and if it is, very expensive (> $250)    Date of last appointment with provider: today    Could we send this information to you in [place] or would you prefer to receive a phone call?:   Patient would prefer a phone call   Okay to leave a detailed message?: Yes at Cell number on file:    Telephone Information:   Mobile [phone]

## 2023-05-01 NOTE — TELEPHONE ENCOUNTER
sent prescription for Augmentin 874 Q8 hours per UTD guidelines:    Diverticulitis, acute (for uncomplicated infection that meets criteria for outpatient therapy or as step-down therapy after clinical improvement on initial parenteral therapy):  Note: Some experts suggest deferring antibiotics in otherwise healthy patients who are immunocompetent with mild disease (Ref).  Immediate release: Oral: 875 mg every 8 hours (Ref).  Extended release: Oral: 2 g every 12 hours (Ref).

## 2023-05-01 NOTE — TELEPHONE ENCOUNTER
"Called the pt and reviewed medication alternative and change.      - Minh \"Chuck\" Epifanio (he/him/his), RN - Patient Advocate Liason (PAL)  [facility]    "

## 2023-05-01 NOTE — TELEPHONE ENCOUNTER
"Called the pharmacy for the pt (pt arrived in our clinic lobby), stating concerns about the cost of the medicaiton.    Pharmacy states that the prescribed medication is not a product they normally carry, and agreed that its quite expensive.    Dr Chapman: Is there alternative treatment?      - Minh \"Chuck\" Epifanio (he/him/his), RN - Patient Advocate Liason (PAL)  [facility]    "

## 2023-05-03 ENCOUNTER — OFFICE VISIT (OUTPATIENT)
Dept: PEDIATRICS | Facility: CLINIC | Age: 58
End: 2023-05-03
Payer: COMMERCIAL

## 2023-05-03 VITALS
HEIGHT: 68 IN | RESPIRATION RATE: 20 BRPM | HEART RATE: 80 BPM | BODY MASS INDEX: 32.74 KG/M2 | SYSTOLIC BLOOD PRESSURE: 118 MMHG | TEMPERATURE: 98.1 F | DIASTOLIC BLOOD PRESSURE: 74 MMHG | OXYGEN SATURATION: 97 % | WEIGHT: 216 LBS

## 2023-05-03 DIAGNOSIS — K57.32 DIVERTICULITIS OF COLON: Primary | ICD-10-CM

## 2023-05-03 PROCEDURE — 99213 OFFICE O/P EST LOW 20 MIN: CPT | Performed by: INTERNAL MEDICINE

## 2023-05-03 ASSESSMENT — PAIN SCALES - GENERAL: PAINLEVEL: MILD PAIN (3)

## 2023-05-03 NOTE — PROGRESS NOTES
"  Assessment & Plan     Diverticulitis of colon  discussed with patient (or patient's parents/caregiver) pathophysiology of condition and treatment options.  Patient is improving on Augmentin.  He will go and continue to complete the full course and continue with the current diet.  He had that done this before many times and knows how to manage this.  Did review the most recent up-to-date guidelines including possible hospitalization if things get any worse for IV antibiotics and patient well aware.  He will keep me posted.      [id number]}     BMI:   Estimated body mass index is 32.84 kg/m  as calculated from the following:    Height as of this encounter: 1.727 m (5' 8\").    Weight as of this encounter: 98 kg (216 lb).     Return if symptoms worsen or fail to improve.    Albert Chapman MD  [facility] [place] is a 57 year old, presenting for the following health issues:  RECHECK        5/3/2023     3:56 PM   Additional Questions   Roomed by Anabel FAY   Accompanied by MALCOLM         5/3/2023     3:56 PM   Patient Reported Additional Medications   Patient reports taking the following new medications med list reviewed         Patient plans today for follow-up diverticulitis.  We did an E-visit earlier in the week patient has long history of diverticulitis and has been hospitalized for this as well as several ER visits.  He notes this pain is exactly the same as previous.  He has not any fevers chills night sweats weight loss nausea or vomiting however.  He would like to manage his symptoms quickly and so we did start antibiotics.  Is been taking the Augmentin 3 times a day and tolerating this well.  He notes his symptoms have improved after approximate 24 hours on the Augmentin he is very happy about this.  He is able to go to work.  He denies any fevers or worsening of symptoms.  He is trying to do mostly liquid diet at this time.  No other concerns or complaints today.    History of Present " "Illness       Reason for visit:  Diverticulitis  Symptom onset:  1-2 weeks ago    He eats 2-3 servings of fruits and vegetables daily.He consumes 0 sweetened beverage(s) daily.He exercises with enough effort to increase his heart rate 10 to 19 minutes per day.  He exercises with enough effort to increase his heart rate 3 or less days per week.   He is taking medications regularly.           Review of Systems   Constitutional, HEENT, cardiovascular, pulmonary, gi and gu systems are negative, except as otherwise noted.      Objective    /74   Pulse 80   Temp 98.1  F (36.7  C) (Oral)   Resp 20   Ht 1.727 m (5' 8\")   Wt 98 kg (216 lb)   SpO2 97%   BMI 32.84 kg/m    Body mass index is 32.84 kg/m .  Physical Exam   GENERAL: healthy, alert and no distress  HENT: normocephalic and atrumatic  mucous membranes moist  ABDOMEN: soft, non tender except for only mild tenderness deep palpation in the LQs bilaterally, no rebound or guarding, bowel sounds normal  PSYCH: mentation appears normal, affect normal/bright                     "

## 2023-05-19 NOTE — PROGRESS NOTES
Discharge Note    Progress reporting period is from initial evaluation date (please see noted date below) to Apr 4, 2023.  Linked Episodes   Type: Episode: Status: Noted: Resolved: Last update: Updated by:   PHYSICAL THERAPY L knee 1/27/2023 Active 1/27/2023 4/4/2023  4:16 PM Day, JEFFREY Baldwin      Comments:       Phillip failed to follow up and current status is unknown.  Please see information below for last relevant information on current status.  Patient seen for 4 visits.    SUBJECTIVE  Subjective changes noted by patient:  Pt is doing well, notices slight improvement, but still gets pain with driving. Otherwise feeling more stable.  .  Current pain level is  .     Previous pain level was   .   Changes in function:  Yes (See Goal flowsheet attached for changes in current functional level)  Adverse reaction to treatment or activity: None    OBJECTIVE  Changes noted in objective findings: L knee flex AROM 110.  L quad set good.     ASSESSMENT/PLAN  Diagnosis: L knee pain   STG/LTGs have been met or progress has been made towards goals:  Yes, please see goal flowsheet for most current information  Assessment of Progress: current status is unknown.    Last current status: Pt is progressing as expected   Self Management Plans:  HEP  I have re-evaluated this patient and find that the nature, scope, duration and intensity of the therapy is appropriate for the medical condition of the patient.  Phillip continues to require the following intervention to meet STG and LTG's:  HEP.    Recommendations:  Discharge with current home program.  Patient to follow up with MD as needed.    Please refer to the daily flowsheet for treatment today, total treatment time and time spent performing 1:1 timed codes.

## 2024-01-26 ENCOUNTER — OFFICE VISIT (OUTPATIENT)
Dept: PEDIATRICS | Facility: CLINIC | Age: 59
End: 2024-01-26
Payer: COMMERCIAL

## 2024-01-26 VITALS
WEIGHT: 220.6 LBS | TEMPERATURE: 98.1 F | BODY MASS INDEX: 33.43 KG/M2 | SYSTOLIC BLOOD PRESSURE: 126 MMHG | DIASTOLIC BLOOD PRESSURE: 60 MMHG | HEART RATE: 86 BPM | RESPIRATION RATE: 20 BRPM | OXYGEN SATURATION: 96 % | HEIGHT: 68 IN

## 2024-01-26 DIAGNOSIS — Z00.00 ROUTINE GENERAL MEDICAL EXAMINATION AT A HEALTH CARE FACILITY: Primary | ICD-10-CM

## 2024-01-26 DIAGNOSIS — Z12.5 PROSTATE CANCER SCREENING: ICD-10-CM

## 2024-01-26 DIAGNOSIS — Z13.6 CARDIOVASCULAR SCREENING; LDL GOAL LESS THAN 160: ICD-10-CM

## 2024-01-26 DIAGNOSIS — N52.9 ERECTILE DYSFUNCTION, UNSPECIFIED ERECTILE DYSFUNCTION TYPE: ICD-10-CM

## 2024-01-26 PROCEDURE — 99396 PREV VISIT EST AGE 40-64: CPT | Performed by: INTERNAL MEDICINE

## 2024-01-26 RX ORDER — SILDENAFIL 100 MG/1
TABLET, FILM COATED ORAL
Qty: 4 TABLET | Refills: 11 | Status: SHIPPED | OUTPATIENT
Start: 2024-01-26

## 2024-01-26 ASSESSMENT — ENCOUNTER SYMPTOMS
HEARTBURN: 0
NAUSEA: 0
JOINT SWELLING: 0
FEVER: 0
DIZZINESS: 0
EYE PAIN: 0
CHILLS: 0
PALPITATIONS: 0
SHORTNESS OF BREATH: 0
HEADACHES: 0
HEMATURIA: 0
COUGH: 0
MYALGIAS: 0
DYSURIA: 0
ARTHRALGIAS: 0
WEAKNESS: 0
FREQUENCY: 0
NERVOUS/ANXIOUS: 0
DIARRHEA: 0
PARESTHESIAS: 0
HEMATOCHEZIA: 0
ABDOMINAL PAIN: 0
SORE THROAT: 0
CONSTIPATION: 0

## 2024-01-26 ASSESSMENT — PAIN SCALES - GENERAL: PAINLEVEL: NO PAIN (0)

## 2024-01-26 ASSESSMENT — ASTHMA QUESTIONNAIRES
QUESTION_3 LAST FOUR WEEKS HOW OFTEN DID YOUR ASTHMA SYMPTOMS (WHEEZING, COUGHING, SHORTNESS OF BREATH, CHEST TIGHTNESS OR PAIN) WAKE YOU UP AT NIGHT OR EARLIER THAN USUAL IN THE MORNING: NOT AT ALL
QUESTION_1 LAST FOUR WEEKS HOW MUCH OF THE TIME DID YOUR ASTHMA KEEP YOU FROM GETTING AS MUCH DONE AT WORK, SCHOOL OR AT HOME: NONE OF THE TIME
ACT_TOTALSCORE: 25
ACT_TOTALSCORE: 25
QUESTION_5 LAST FOUR WEEKS HOW WOULD YOU RATE YOUR ASTHMA CONTROL: COMPLETELY CONTROLLED
QUESTION_4 LAST FOUR WEEKS HOW OFTEN HAVE YOU USED YOUR RESCUE INHALER OR NEBULIZER MEDICATION (SUCH AS ALBUTEROL): NOT AT ALL
QUESTION_2 LAST FOUR WEEKS HOW OFTEN HAVE YOU HAD SHORTNESS OF BREATH: NOT AT ALL

## 2024-01-26 NOTE — PROGRESS NOTES
Preventive Care Visit  [facility] [place] Chapman MD, Internal Medicine - Pediatrics  Jan 26, 2024      SUBJECTIVE:   Phillip is a 58 year old, presenting for the following:  Physical        1/26/2024     9:58 AM   Additional Questions   Roomed by Maxwell Kilpatrick   Accompanied by N/A         1/26/2024     9:58 AM   Patient Reported Additional Medications   Patient reports taking the following new medications No     Healthy Habits:     Getting at least 3 servings of Calcium per day:  Yes    Bi-annual eye exam:  Yes    Dental care twice a year:  Yes    Sleep apnea or symptoms of sleep apnea:  None    Diet:  Regular (no restrictions)    Frequency of exercise:  1 day/week    Duration of exercise:  15-30 minutes    Taking medications regularly:  Yes    Medication side effects:  None    Additional concerns today:  No    pt here for annual visit, feels well. no concerns, needs refill and due for labs. mood is good. No other concerns or complaints today.     Today's PHQ-2 Score:       1/26/2024     7:38 AM   PHQ-2 ( 1999 [employer])   Q1: Little interest or pleasure in doing things 0   Q2: Feeling down, depressed or hopeless 0   PHQ-2 Score 0   Q1: Little interest or pleasure in doing things Not at all   Q2: Feeling down, depressed or hopeless Not at all   PHQ-2 Score 0       Social History     Tobacco Use    Smoking status: Never    Smokeless tobacco: Never   Substance Use Topics    Alcohol use: No     Alcohol/week: 0.0 standard drinks of alcohol             1/26/2024     7:38 AM   Alcohol Use   Prescreen: >3 drinks/day or >7 drinks/week? No       Last PSA:   PSA   Date Value Ref Range Status   12/10/2019 0.74 0 - 4 ug/L Final     Comment:     Assay Method:  Chemiluminescence using Siemens Vista analyzer     Prostate Specific Antigen Screen   Date Value Ref Range Status   01/11/2023 1.34 0.00 - 3.50 ng/mL Final   07/14/2021 0.49 ug/L Final       Reviewed orders with patient. Reviewed health maintenance and  "updated orders accordingly - Yes  BP Readings from Last 3 Encounters:   01/26/24 126/60   05/03/23 118/74   01/30/23 133/89    Wt Readings from Last 3 Encounters:   01/26/24 100.1 kg (220 lb 9.6 oz)   05/03/23 98 kg (216 lb)   01/30/23 96.4 kg (212 lb 9.6 oz)                    Reviewed and updated as needed this visit by clinical staff   Tobacco  Allergies  Meds              Reviewed and updated as needed this visit by Provider                    Review of Systems   Constitutional:  Negative for chills and fever.   HENT:  Negative for congestion, ear pain, hearing loss and sore throat.    Eyes:  Negative for pain and visual disturbance.   Respiratory:  Negative for cough and shortness of breath.    Cardiovascular:  Negative for chest pain and palpitations.   Gastrointestinal:  Negative for abdominal pain, constipation, diarrhea and nausea.   Genitourinary:  Negative for dysuria, frequency, genital sores, hematuria, penile discharge and urgency.   Musculoskeletal:  Negative for arthralgias, joint swelling and myalgias.   Skin:  Negative for rash.   Neurological:  Negative for dizziness, weakness and headaches.   Psychiatric/Behavioral:  The patient is not nervous/anxious.        OBJECTIVE:   /60 (BP Location: Right arm, Patient Position: Sitting, Cuff Size: Adult Regular)   Pulse 86   Temp 98.1  F (36.7  C) (Tympanic)   Resp 20   Ht 1.73 m (5' 8.11\")   Wt 100.1 kg (220 lb 9.6 oz)   SpO2 96%   BMI 33.43 kg/m     Estimated body mass index is 33.43 kg/m  as calculated from the following:    Height as of this encounter: 1.73 m (5' 8.11\").    Weight as of this encounter: 100.1 kg (220 lb 9.6 oz).  Physical Exam  GENERAL: healthy, alert and no distress  EYES: Eyes grossly normal to inspection, PERRL and conjunctivae and sclerae normal  HENT: ear canals and TM's normal, op clear, mucous membranes moist   NECK: no adenopathy, no asymmetry  RESP: lungs clear to auscultation - no rales, rhonchi or wheezes  CV: " "regular rate and rhythm, normal S1 S2, no S3 or S4, no murmur, click or rub, no peripheral edema   ABDOMEN: soft, nontender, no hepatosplenomegaly, no masses and bowel sounds normal  MS: no gross musculoskeletal defects noted, no edema  SKIN: no suspicious lesions or rashes  NEURO: Normal strength and tone, mentation intact and speech normal  PSYCH: mentation appears normal, affect normal/bright          ASSESSMENT/PLAN:   Routine general medical examination at a health care facility  d/w pt preventative care measures including seat belt use, bike helmet, moderation of EtOH, avoiding tobacco, avoiding excessive sun exposure/sunscreen, wt management or wt loss if BMI > 30, need to screen for lipid disorders, mood disorders, CAD risk factors, etc. Also discussed accident prevention and future RHM schedule.   - Lipid panel reflex to direct LDL Fasting; Future  - PSA, screen; Future  - Comprehensive metabolic panel (BMP + Alb, Alk Phos, ALT, AST, Total. Bili, TP); Future    Erectile dysfunction, unspecified erectile dysfunction type  discussed with patient (or patient's parents/caregiver) pathophysiology of condition and treatment options.  Well controlled on current medication(s).   - sildenafil (VIAGRA) 100 MG tablet; take 1/2 to one tab daily as needed for erectile dysfunction. take 30 min to 4 hours before sexual activity    Prostate cancer screening  due for recheck  - PSA, screen; Future    CARDIOVASCULAR SCREENING; LDL GOAL LESS THAN 160  due for labs  - Lipid panel reflex to direct LDL Fasting; Future  - Comprehensive metabolic panel (BMP + Alb, Alk Phos, ALT, AST, Total. Bili, TP); Future          Counseling  Reviewed preventive health counseling, as reflected in patient instructions      BMI  Estimated body mass index is 33.43 kg/m  as calculated from the following:    Height as of this encounter: 1.73 m (5' 8.11\").    Weight as of this encounter: 100.1 kg (220 lb 9.6 oz).         He reports that he has never " smoked. He has never used smokeless tobacco.      I have discussed with patient the risks, benefits, medications, treatment options and modalities.   I have instructed the patient to call or schedule a follow-up appointment if any problems or failure to improve.         Signed Electronically by: Albert Chapman MD      Answers submitted by the patient for this visit:  Annual Preventive Visit (Submitted on 1/26/2024)  Chief Complaint: Annual Exam:  Blood in stool: No  heartburn: No  peripheral edema: No  mood changes: No  Skin sensation changes: No  impotence: No

## 2024-02-10 ENCOUNTER — LAB (OUTPATIENT)
Dept: LAB | Facility: CLINIC | Age: 59
End: 2024-02-10
Payer: COMMERCIAL

## 2024-02-10 DIAGNOSIS — Z12.5 PROSTATE CANCER SCREENING: ICD-10-CM

## 2024-02-10 DIAGNOSIS — Z13.6 CARDIOVASCULAR SCREENING; LDL GOAL LESS THAN 160: ICD-10-CM

## 2024-02-10 DIAGNOSIS — Z00.00 ROUTINE GENERAL MEDICAL EXAMINATION AT A HEALTH CARE FACILITY: ICD-10-CM

## 2024-02-10 LAB
ALBUMIN SERPL BCG-MCNC: 4.5 G/DL (ref 3.5–5.2)
ALP SERPL-CCNC: 59 U/L (ref 40–150)
ALT SERPL W P-5'-P-CCNC: 24 U/L (ref 0–70)
ANION GAP SERPL CALCULATED.3IONS-SCNC: 9 MMOL/L (ref 7–15)
AST SERPL W P-5'-P-CCNC: 21 U/L (ref 0–45)
BILIRUB SERPL-MCNC: 0.5 MG/DL
BUN SERPL-MCNC: 11.7 MG/DL (ref 6–20)
CALCIUM SERPL-MCNC: 9.5 MG/DL (ref 8.6–10)
CHLORIDE SERPL-SCNC: 104 MMOL/L (ref 98–107)
CHOLEST SERPL-MCNC: 170 MG/DL
CREAT SERPL-MCNC: 0.83 MG/DL (ref 0.67–1.17)
DEPRECATED HCO3 PLAS-SCNC: 29 MMOL/L (ref 22–29)
EGFRCR SERPLBLD CKD-EPI 2021: >90 ML/MIN/1.73M2
FASTING STATUS PATIENT QL REPORTED: YES
GLUCOSE SERPL-MCNC: 88 MG/DL (ref 70–99)
HDLC SERPL-MCNC: 36 MG/DL
LDLC SERPL CALC-MCNC: 100 MG/DL
NONHDLC SERPL-MCNC: 134 MG/DL
POTASSIUM SERPL-SCNC: 4.3 MMOL/L (ref 3.4–5.3)
PROT SERPL-MCNC: 7.1 G/DL (ref 6.4–8.3)
PSA SERPL DL<=0.01 NG/ML-MCNC: 0.51 NG/ML (ref 0–3.5)
SODIUM SERPL-SCNC: 142 MMOL/L (ref 135–145)
TRIGL SERPL-MCNC: 171 MG/DL

## 2024-02-10 PROCEDURE — 80053 COMPREHEN METABOLIC PANEL: CPT

## 2024-02-10 PROCEDURE — G0103 PSA SCREENING: HCPCS

## 2024-02-10 PROCEDURE — 36415 COLL VENOUS BLD VENIPUNCTURE: CPT

## 2024-02-10 PROCEDURE — 80061 LIPID PANEL: CPT

## 2024-03-19 ENCOUNTER — OFFICE VISIT (OUTPATIENT)
Dept: OPTOMETRY | Facility: CLINIC | Age: 59
End: 2024-03-19
Payer: COMMERCIAL

## 2024-03-19 DIAGNOSIS — Z98.890 S/P LASIK SURGERY: ICD-10-CM

## 2024-03-19 DIAGNOSIS — H43.392 FLOATERS IN VISUAL FIELD, LEFT: ICD-10-CM

## 2024-03-19 DIAGNOSIS — H52.4 PRESBYOPIA: Primary | ICD-10-CM

## 2024-03-19 PROCEDURE — 92014 COMPRE OPH EXAM EST PT 1/>: CPT | Performed by: OPTOMETRIST

## 2024-03-19 PROCEDURE — 92015 DETERMINE REFRACTIVE STATE: CPT | Performed by: OPTOMETRIST

## 2024-03-19 ASSESSMENT — REFRACTION_MANIFEST
OD_AXIS: 089
OD_CYLINDER: +0.75
OD_CYLINDER: SPHERE
OD_SPHERE: -1.50
OS_CYLINDER: SPHERE
OS_AXIS: 006
OD_SPHERE: -0.25
OS_SPHERE: -0.25
OS_CYLINDER: +0.50
OD_ADD: +1.50
OS_SPHERE: -1.00
OS_ADD: +1.50

## 2024-03-19 ASSESSMENT — KERATOMETRY
OD_AXISANGLE2_DEGREES: 169
OS_AXISANGLE_DEGREES: 044
OS_AXISANGLE2_DEGREES: 134
OD_K2POWER_DIOPTERS: 41.62
OS_K1POWER_DIOPTERS: 41.00
OD_K1POWER_DIOPTERS: 40.75
OS_K2POWER_DIOPTERS: 41.37
OD_AXISANGLE_DEGREES: 079

## 2024-03-19 ASSESSMENT — CUP TO DISC RATIO
OD_RATIO: 0.2
OS_RATIO: 0.2

## 2024-03-19 ASSESSMENT — SLIT LAMP EXAM - LIDS: COMMENTS: NORMAL

## 2024-03-19 ASSESSMENT — VISUAL ACUITY
METHOD: SNELLEN - LINEAR
OD_SC: 20/20
OD_SC: J7
OS_SC: J5
OS_SC: 20/25

## 2024-03-19 ASSESSMENT — REFRACTION_WEARINGRX
SPECS_TYPE: OTC READER
OD_SPHERE: +1.25

## 2024-03-19 ASSESSMENT — TONOMETRY
IOP_METHOD: APPLANATION
OS_IOP_MMHG: 16
OD_IOP_MMHG: 16

## 2024-03-19 ASSESSMENT — CONF VISUAL FIELD
OS_NORMAL: 1
OS_SUPERIOR_NASAL_RESTRICTION: 0
OD_SUPERIOR_TEMPORAL_RESTRICTION: 0
OD_INFERIOR_NASAL_RESTRICTION: 0
METHOD: COUNTING FINGERS
OS_INFERIOR_NASAL_RESTRICTION: 0
OD_INFERIOR_TEMPORAL_RESTRICTION: 0
OS_SUPERIOR_TEMPORAL_RESTRICTION: 0
OD_SUPERIOR_NASAL_RESTRICTION: 0
OS_INFERIOR_TEMPORAL_RESTRICTION: 0
OD_NORMAL: 1

## 2024-03-19 ASSESSMENT — EXTERNAL EXAM - LEFT EYE: OS_EXAM: NORMAL

## 2024-03-19 ASSESSMENT — EXTERNAL EXAM - RIGHT EYE: OD_EXAM: NORMAL

## 2024-03-19 NOTE — LETTER
3/19/2024         RE: Phillip Nassar  [address]  [place] [address]        Dear Colleague,    Thank you for referring your patient, Phillip Nassar, to the [facility] [place]. Please see a copy of my visit note below.    Chief Complaint   Patient presents with     Annual Eye Exam         Last Eye Exam: March 2023  Dilated Previously: Yes, side effects of dilation explained today    What are you currently using to see?  Readers +1.25    Seeing floaters more in left eye, not sure if new or just more noticeable. Starting to bother patient as he is seeing them more in center of vision on computer  They did get new lights at work   Distance Vision Acuity: Satisfied with vision    Near Vision Acuity: Satisfied with vision while reading  with readers    Eye Comfort: good and dry  Do you use eye drops? : Yes: otc eye drops PRN   Occupation or Hobbies: MRI tech    Jewels Person, OA         S/p lasik       Medical, surgical and family histories reviewed and updated 3/19/2024.       OBJECTIVE: See Ophthalmology exam    ASSESSMENT:    ICD-10-CM    1. Presbyopia  H52.4 EYE EXAM (SIMPLE-NONBILLABLE)     REFRACTION      2. S/P LASIK surgery  Z98.890 EYE EXAM (SIMPLE-NONBILLABLE)     REFRACTION      3. Floaters in visual field, left  H43.392           PLAN:   S/s retinal detachment    Optional bifocal or prescription readers   +1.25-+1.50 over the counter      Trang Umana OD     Again, thank you for allowing me to participate in the care of your patient.        Sincerely,        Trang Umana, OD

## 2024-03-19 NOTE — PROGRESS NOTES
Chief Complaint   Patient presents with    Annual Eye Exam         Last Eye Exam: March 2023  Dilated Previously: Yes, side effects of dilation explained today    What are you currently using to see?  Readers +1.25    Seeing floaters more in left eye, not sure if new or just more noticeable. Starting to bother patient as he is seeing them more in center of vision on computer  They did get new lights at work   Distance Vision Acuity: Satisfied with vision    Near Vision Acuity: Satisfied with vision while reading  with readers    Eye Comfort: good and dry  Do you use eye drops? : Yes: otc eye drops PRN   Occupation or Hobbies: MRI tech    [employer] Person, OA         S/p lasik       Medical, surgical and family histories reviewed and updated 3/19/2024.       OBJECTIVE: See Ophthalmology exam    ASSESSMENT:    ICD-10-CM    1. Presbyopia  H52.4 EYE EXAM (SIMPLE-NONBILLABLE)     REFRACTION      2. S/P LASIK surgery  Z98.890 EYE EXAM (SIMPLE-NONBILLABLE)     REFRACTION      3. Floaters in visual field, left  H43.392           PLAN:   S/s retinal detachment    Optional bifocal or prescription readers   +1.25-+1.50 over the counter      Trang Umana OD

## 2024-04-05 DIAGNOSIS — N52.9 ERECTILE DYSFUNCTION, UNSPECIFIED ERECTILE DYSFUNCTION TYPE: ICD-10-CM

## 2024-04-08 RX ORDER — SILDENAFIL 100 MG/1
TABLET, FILM COATED ORAL
Qty: 4 TABLET | Refills: 4 | OUTPATIENT
Start: 2024-04-08

## 2024-04-08 NOTE — TELEPHONE ENCOUNTER
46 - Called and spoke with Rafaela Rodriguez at [address] [facility] and scheduled patient for Thoracentesis on Friday 6/10/22 @ 1030 (arrival time of 1000). Called and spoke with Danie Santana at [address] [facility] and requested acceptable INR for procedure. Per Danie Santana, patient can have INR 2.0 or less. Called and left a voicemail with patient with appointment information. Awaiting a call back. [address] [facility] and spoke with patient's wife who verbalized understanding of thoracentesis appointment. Pharmacy requested refills that are already active on file. Refused request to pharmacy.

## 2024-10-02 ENCOUNTER — E-VISIT (OUTPATIENT)
Dept: PEDIATRICS | Facility: CLINIC | Age: 59
End: 2024-10-02
Payer: COMMERCIAL

## 2024-10-02 DIAGNOSIS — R05.9 COUGH, UNSPECIFIED TYPE: ICD-10-CM

## 2024-10-02 DIAGNOSIS — J01.91 ACUTE RECURRENT SINUSITIS, UNSPECIFIED LOCATION: Primary | ICD-10-CM

## 2024-10-02 PROCEDURE — 99422 OL DIG E/M SVC 11-20 MIN: CPT | Performed by: INTERNAL MEDICINE

## 2024-10-02 RX ORDER — CODEINE PHOSPHATE AND GUAIFENESIN 10; 100 MG/5ML; MG/5ML
1 SOLUTION ORAL EVERY 4 HOURS PRN
Qty: 60 ML | Refills: 0 | Status: SHIPPED | OUTPATIENT
Start: 2024-10-02

## 2024-10-02 NOTE — TELEPHONE ENCOUNTER
Provider E-Visit time total (minutes): 11 min\     reviewed, no concerns. Prescription sent electronically

## 2024-10-02 NOTE — PATIENT INSTRUCTIONS

## 2024-10-04 ENCOUNTER — APPOINTMENT (OUTPATIENT)
Dept: GENERAL RADIOLOGY | Facility: CLINIC | Age: 59
DRG: 177 | End: 2024-10-04
Attending: EMERGENCY MEDICINE
Payer: COMMERCIAL

## 2024-10-04 ENCOUNTER — HOSPITAL ENCOUNTER (INPATIENT)
Facility: CLINIC | Age: 59
LOS: 3 days | Discharge: HOME | DRG: 177 | End: 2024-10-07
Attending: EMERGENCY MEDICINE | Admitting: STUDENT IN AN ORGANIZED HEALTH CARE EDUCATION/TRAINING PROGRAM
Payer: COMMERCIAL

## 2024-10-04 DIAGNOSIS — U07.1 MYOCARDITIS DUE TO COVID-19 VIRUS: Primary | ICD-10-CM

## 2024-10-04 DIAGNOSIS — I21.4 NSTEMI (NON-ST ELEVATED MYOCARDIAL INFARCTION) (H): ICD-10-CM

## 2024-10-04 DIAGNOSIS — I40.0 MYOCARDITIS DUE TO COVID-19 VIRUS: Primary | ICD-10-CM

## 2024-10-04 LAB
ANION GAP SERPL CALCULATED.3IONS-SCNC: 15 MMOL/L (ref 7–15)
BASOPHILS # BLD AUTO: 0 10E3/UL (ref 0–0.2)
BASOPHILS NFR BLD AUTO: 0 %
BUN SERPL-MCNC: 13.9 MG/DL (ref 6–20)
CALCIUM SERPL-MCNC: 8.6 MG/DL (ref 8.8–10.4)
CHLORIDE SERPL-SCNC: 98 MMOL/L (ref 98–107)
CREAT SERPL-MCNC: 0.96 MG/DL (ref 0.67–1.17)
EGFRCR SERPLBLD CKD-EPI 2021: >90 ML/MIN/1.73M2
EOSINOPHIL # BLD AUTO: 0.2 10E3/UL (ref 0–0.7)
EOSINOPHIL NFR BLD AUTO: 3 %
ERYTHROCYTE [DISTWIDTH] IN BLOOD BY AUTOMATED COUNT: 13.6 % (ref 10–15)
FLUAV RNA SPEC QL NAA+PROBE: NEGATIVE
FLUBV RNA RESP QL NAA+PROBE: NEGATIVE
GLUCOSE SERPL-MCNC: 103 MG/DL (ref 70–99)
HCO3 SERPL-SCNC: 22 MMOL/L (ref 22–29)
HCT VFR BLD AUTO: 46.8 % (ref 40–53)
HGB BLD-MCNC: 15.2 G/DL (ref 13.3–17.7)
HOLD SPECIMEN: NORMAL
HOLD SPECIMEN: NORMAL
IMM GRANULOCYTES # BLD: 0 10E3/UL
IMM GRANULOCYTES NFR BLD: 0 %
LYMPHOCYTES # BLD AUTO: 0.9 10E3/UL (ref 0.8–5.3)
LYMPHOCYTES NFR BLD AUTO: 11 %
MCH RBC QN AUTO: 28.4 PG (ref 26.5–33)
MCHC RBC AUTO-ENTMCNC: 32.5 G/DL (ref 31.5–36.5)
MCV RBC AUTO: 87 FL (ref 78–100)
MONOCYTES # BLD AUTO: 1 10E3/UL (ref 0–1.3)
MONOCYTES NFR BLD AUTO: 12 %
NEUTROPHILS # BLD AUTO: 6.3 10E3/UL (ref 1.6–8.3)
NEUTROPHILS NFR BLD AUTO: 74 %
NRBC # BLD AUTO: 0 10E3/UL
NRBC BLD AUTO-RTO: 0 /100
PLATELET # BLD AUTO: 173 10E3/UL (ref 150–450)
POTASSIUM SERPL-SCNC: 4.1 MMOL/L (ref 3.4–5.3)
RBC # BLD AUTO: 5.36 10E6/UL (ref 4.4–5.9)
RSV RNA SPEC NAA+PROBE: NEGATIVE
SARS-COV-2 RNA RESP QL NAA+PROBE: POSITIVE
SODIUM SERPL-SCNC: 135 MMOL/L (ref 135–145)
TROPONIN T SERPL HS-MCNC: 710 NG/L
TROPONIN T SERPL HS-MCNC: 795 NG/L
WBC # BLD AUTO: 8.4 10E3/UL (ref 4–11)

## 2024-10-04 PROCEDURE — 84484 ASSAY OF TROPONIN QUANT: CPT | Performed by: EMERGENCY MEDICINE

## 2024-10-04 PROCEDURE — 85025 COMPLETE CBC W/AUTO DIFF WBC: CPT | Performed by: EMERGENCY MEDICINE

## 2024-10-04 PROCEDURE — 250N000013 HC RX MED GY IP 250 OP 250 PS 637: Performed by: EMERGENCY MEDICINE

## 2024-10-04 PROCEDURE — 71046 X-RAY EXAM CHEST 2 VIEWS: CPT

## 2024-10-04 PROCEDURE — 99285 EMERGENCY DEPT VISIT HI MDM: CPT | Mod: 25

## 2024-10-04 PROCEDURE — 36415 COLL VENOUS BLD VENIPUNCTURE: CPT | Performed by: EMERGENCY MEDICINE

## 2024-10-04 PROCEDURE — 250N000011 HC RX IP 250 OP 636: Performed by: EMERGENCY MEDICINE

## 2024-10-04 PROCEDURE — 87637 SARSCOV2&INF A&B&RSV AMP PRB: CPT | Performed by: EMERGENCY MEDICINE

## 2024-10-04 PROCEDURE — 120N000001 HC R&B MED SURG/OB

## 2024-10-04 PROCEDURE — 80048 BASIC METABOLIC PNL TOTAL CA: CPT | Performed by: EMERGENCY MEDICINE

## 2024-10-04 PROCEDURE — 96374 THER/PROPH/DIAG INJ IV PUSH: CPT

## 2024-10-04 PROCEDURE — 99222 1ST HOSP IP/OBS MODERATE 55: CPT | Performed by: STUDENT IN AN ORGANIZED HEALTH CARE EDUCATION/TRAINING PROGRAM

## 2024-10-04 PROCEDURE — 93005 ELECTROCARDIOGRAM TRACING: CPT

## 2024-10-04 RX ORDER — LIDOCAINE 40 MG/G
CREAM TOPICAL
Status: DISCONTINUED | OUTPATIENT
Start: 2024-10-04 | End: 2024-10-07 | Stop reason: HOSPADM

## 2024-10-04 RX ORDER — HEPARIN SODIUM 10000 [USP'U]/100ML
0-5000 INJECTION, SOLUTION INTRAVENOUS CONTINUOUS
Status: DISCONTINUED | OUTPATIENT
Start: 2024-10-04 | End: 2024-10-07

## 2024-10-04 RX ORDER — HYDRALAZINE HYDROCHLORIDE 20 MG/ML
10 INJECTION INTRAMUSCULAR; INTRAVENOUS EVERY 4 HOURS PRN
Status: DISCONTINUED | OUTPATIENT
Start: 2024-10-04 | End: 2024-10-07 | Stop reason: HOSPADM

## 2024-10-04 RX ORDER — ASPIRIN 325 MG
325 TABLET ORAL ONCE
Status: COMPLETED | OUTPATIENT
Start: 2024-10-04 | End: 2024-10-04

## 2024-10-04 RX ORDER — AMOXICILLIN 250 MG
2 CAPSULE ORAL 2 TIMES DAILY PRN
Status: DISCONTINUED | OUTPATIENT
Start: 2024-10-04 | End: 2024-10-07 | Stop reason: HOSPADM

## 2024-10-04 RX ORDER — HYDRALAZINE HYDROCHLORIDE 10 MG/1
10 TABLET, FILM COATED ORAL EVERY 4 HOURS PRN
Status: DISCONTINUED | OUTPATIENT
Start: 2024-10-04 | End: 2024-10-07 | Stop reason: HOSPADM

## 2024-10-04 RX ORDER — CALCIUM CARBONATE 500 MG/1
1000 TABLET, CHEWABLE ORAL 4 TIMES DAILY PRN
Status: DISCONTINUED | OUTPATIENT
Start: 2024-10-04 | End: 2024-10-07 | Stop reason: HOSPADM

## 2024-10-04 RX ORDER — ONDANSETRON 4 MG/1
4 TABLET, ORALLY DISINTEGRATING ORAL EVERY 6 HOURS PRN
Status: DISCONTINUED | OUTPATIENT
Start: 2024-10-04 | End: 2024-10-07 | Stop reason: HOSPADM

## 2024-10-04 RX ORDER — AMOXICILLIN 250 MG
1 CAPSULE ORAL 2 TIMES DAILY PRN
Status: DISCONTINUED | OUTPATIENT
Start: 2024-10-04 | End: 2024-10-07 | Stop reason: HOSPADM

## 2024-10-04 RX ORDER — ONDANSETRON 2 MG/ML
4 INJECTION INTRAMUSCULAR; INTRAVENOUS EVERY 6 HOURS PRN
Status: DISCONTINUED | OUTPATIENT
Start: 2024-10-04 | End: 2024-10-07 | Stop reason: HOSPADM

## 2024-10-04 RX ORDER — NITROGLYCERIN 80 MG/1
1 PATCH TRANSDERMAL ONCE
Status: COMPLETED | OUTPATIENT
Start: 2024-10-04 | End: 2024-10-05

## 2024-10-04 RX ADMIN — NITROGLYCERIN 1 PATCH: 0.4 PATCH TRANSDERMAL at 21:46

## 2024-10-04 RX ADMIN — HEPARIN SODIUM 1200 UNITS/HR: 10000 INJECTION, SOLUTION INTRAVENOUS at 21:43

## 2024-10-04 RX ADMIN — ASPIRIN 325 MG ORAL TABLET 325 MG: 325 PILL ORAL at 21:43

## 2024-10-04 ASSESSMENT — ACTIVITIES OF DAILY LIVING (ADL)
ADLS_ACUITY_SCORE: 35
WEAR_GLASSES_OR_BLIND: YES
ADLS_ACUITY_SCORE: 35
TOILETING_ISSUES: NO
FALL_HISTORY_WITHIN_LAST_SIX_MONTHS: NO
DIFFICULTY_COMMUNICATING: NO
ADLS_ACUITY_SCORE: 35
DOING_ERRANDS_INDEPENDENTLY_DIFFICULTY: NO
CONCENTRATING,_REMEMBERING_OR_MAKING_DECISIONS_DIFFICULTY: NO
DIFFICULTY_EATING/SWALLOWING: NO
CHANGE_IN_FUNCTIONAL_STATUS_SINCE_ONSET_OF_CURRENT_ILLNESS/INJURY: NO
ADLS_ACUITY_SCORE: 35
WALKING_OR_CLIMBING_STAIRS_DIFFICULTY: NO
HEARING_DIFFICULTY_OR_DEAF: NO
DRESSING/BATHING_DIFFICULTY: NO

## 2024-10-04 ASSESSMENT — COLUMBIA-SUICIDE SEVERITY RATING SCALE - C-SSRS
2. HAVE YOU ACTUALLY HAD ANY THOUGHTS OF KILLING YOURSELF IN THE PAST MONTH?: NO
6. HAVE YOU EVER DONE ANYTHING, STARTED TO DO ANYTHING, OR PREPARED TO DO ANYTHING TO END YOUR LIFE?: NO
1. IN THE PAST MONTH, HAVE YOU WISHED YOU WERE DEAD OR WISHED YOU COULD GO TO SLEEP AND NOT WAKE UP?: NO

## 2024-10-04 NOTE — ED TRIAGE NOTES
Pt tested positive for covid at home last night. Has had symptoms for 5 days. Chest pain started this morning and improved with ibuprofen. Chest pain returned this afternoon and was bad enough that pt had difficulty standing. Pt alert and ambulatory.

## 2024-10-05 ENCOUNTER — APPOINTMENT (OUTPATIENT)
Dept: CARDIOLOGY | Facility: CLINIC | Age: 59
DRG: 177 | End: 2024-10-05
Attending: STUDENT IN AN ORGANIZED HEALTH CARE EDUCATION/TRAINING PROGRAM
Payer: COMMERCIAL

## 2024-10-05 LAB
ALBUMIN SERPL BCG-MCNC: 3.7 G/DL (ref 3.5–5.2)
ALP SERPL-CCNC: 55 U/L (ref 40–150)
ALT SERPL W P-5'-P-CCNC: 36 U/L (ref 0–70)
ANION GAP SERPL CALCULATED.3IONS-SCNC: 15 MMOL/L (ref 7–15)
AST SERPL W P-5'-P-CCNC: 69 U/L (ref 0–45)
BILIRUB SERPL-MCNC: 0.4 MG/DL
BUN SERPL-MCNC: 12 MG/DL (ref 6–20)
CALCIUM SERPL-MCNC: 8.5 MG/DL (ref 8.8–10.4)
CHLORIDE SERPL-SCNC: 102 MMOL/L (ref 98–107)
CHOLEST SERPL-MCNC: 132 MG/DL
CREAT SERPL-MCNC: 0.83 MG/DL (ref 0.67–1.17)
EGFRCR SERPLBLD CKD-EPI 2021: >90 ML/MIN/1.73M2
ERYTHROCYTE [DISTWIDTH] IN BLOOD BY AUTOMATED COUNT: 13.5 % (ref 10–15)
GLUCOSE SERPL-MCNC: 98 MG/DL (ref 70–99)
HCO3 SERPL-SCNC: 21 MMOL/L (ref 22–29)
HCT VFR BLD AUTO: 43.1 % (ref 40–53)
HDLC SERPL-MCNC: 36 MG/DL
HGB BLD-MCNC: 14.2 G/DL (ref 13.3–17.7)
LDLC SERPL CALC-MCNC: 75 MG/DL
LVEF ECHO: NORMAL
MAGNESIUM SERPL-MCNC: 2 MG/DL (ref 1.7–2.3)
MCH RBC QN AUTO: 28.5 PG (ref 26.5–33)
MCHC RBC AUTO-ENTMCNC: 32.9 G/DL (ref 31.5–36.5)
MCV RBC AUTO: 86 FL (ref 78–100)
NONHDLC SERPL-MCNC: 96 MG/DL
PLATELET # BLD AUTO: 178 10E3/UL (ref 150–450)
POTASSIUM SERPL-SCNC: 3.9 MMOL/L (ref 3.4–5.3)
PROT SERPL-MCNC: 6.3 G/DL (ref 6.4–8.3)
RBC # BLD AUTO: 4.99 10E6/UL (ref 4.4–5.9)
SODIUM SERPL-SCNC: 138 MMOL/L (ref 135–145)
TRIGL SERPL-MCNC: 104 MG/DL
TROPONIN T SERPL HS-MCNC: 724 NG/L
TROPONIN T SERPL HS-MCNC: 765 NG/L
UFH PPP CHRO-ACNC: 0.13 IU/ML
UFH PPP CHRO-ACNC: 0.16 IU/ML
UFH PPP CHRO-ACNC: 0.26 IU/ML
WBC # BLD AUTO: 5.8 10E3/UL (ref 4–11)

## 2024-10-05 PROCEDURE — 84484 ASSAY OF TROPONIN QUANT: CPT | Performed by: STUDENT IN AN ORGANIZED HEALTH CARE EDUCATION/TRAINING PROGRAM

## 2024-10-05 PROCEDURE — 255N000002 HC RX 255 OP 636: Performed by: STUDENT IN AN ORGANIZED HEALTH CARE EDUCATION/TRAINING PROGRAM

## 2024-10-05 PROCEDURE — 36415 COLL VENOUS BLD VENIPUNCTURE: CPT | Performed by: STUDENT IN AN ORGANIZED HEALTH CARE EDUCATION/TRAINING PROGRAM

## 2024-10-05 PROCEDURE — 99232 SBSQ HOSP IP/OBS MODERATE 35: CPT | Performed by: INTERNAL MEDICINE

## 2024-10-05 PROCEDURE — 83735 ASSAY OF MAGNESIUM: CPT | Performed by: INTERNAL MEDICINE

## 2024-10-05 PROCEDURE — 250N000013 HC RX MED GY IP 250 OP 250 PS 637: Performed by: INTERNAL MEDICINE

## 2024-10-05 PROCEDURE — 99223 1ST HOSP IP/OBS HIGH 75: CPT | Performed by: INTERNAL MEDICINE

## 2024-10-05 PROCEDURE — 36415 COLL VENOUS BLD VENIPUNCTURE: CPT | Performed by: INTERNAL MEDICINE

## 2024-10-05 PROCEDURE — 80053 COMPREHEN METABOLIC PANEL: CPT | Performed by: STUDENT IN AN ORGANIZED HEALTH CARE EDUCATION/TRAINING PROGRAM

## 2024-10-05 PROCEDURE — 120N000001 HC R&B MED SURG/OB

## 2024-10-05 PROCEDURE — 85520 HEPARIN ASSAY: CPT | Performed by: INTERNAL MEDICINE

## 2024-10-05 PROCEDURE — 80061 LIPID PANEL: CPT | Performed by: INTERNAL MEDICINE

## 2024-10-05 PROCEDURE — 93306 TTE W/DOPPLER COMPLETE: CPT | Mod: 26 | Performed by: INTERNAL MEDICINE

## 2024-10-05 PROCEDURE — 85027 COMPLETE CBC AUTOMATED: CPT | Performed by: STUDENT IN AN ORGANIZED HEALTH CARE EDUCATION/TRAINING PROGRAM

## 2024-10-05 PROCEDURE — 250N000011 HC RX IP 250 OP 636: Performed by: EMERGENCY MEDICINE

## 2024-10-05 PROCEDURE — 85520 HEPARIN ASSAY: CPT | Performed by: EMERGENCY MEDICINE

## 2024-10-05 PROCEDURE — 999N000208 ECHOCARDIOGRAM COMPLETE

## 2024-10-05 RX ORDER — ASPIRIN 325 MG
325 TABLET ORAL DAILY
Status: DISCONTINUED | OUTPATIENT
Start: 2024-10-05 | End: 2024-10-06

## 2024-10-05 RX ORDER — PSEUDOEPHEDRINE HCL 120 MG/1
120 TABLET, FILM COATED, EXTENDED RELEASE ORAL EVERY 12 HOURS PRN
COMMUNITY

## 2024-10-05 RX ORDER — ATORVASTATIN CALCIUM 40 MG/1
40 TABLET, FILM COATED ORAL EVERY EVENING
Status: DISCONTINUED | OUTPATIENT
Start: 2024-10-05 | End: 2024-10-07

## 2024-10-05 RX ORDER — ALBUTEROL SULFATE 5 MG/ML
2.5 SOLUTION RESPIRATORY (INHALATION) EVERY 6 HOURS PRN
Status: DISCONTINUED | OUTPATIENT
Start: 2024-10-05 | End: 2024-10-07 | Stop reason: HOSPADM

## 2024-10-05 RX ORDER — ACETAMINOPHEN 500 MG
1000 TABLET ORAL ONCE
Status: COMPLETED | OUTPATIENT
Start: 2024-10-05 | End: 2024-10-05

## 2024-10-05 RX ORDER — METOPROLOL SUCCINATE 25 MG/1
25 TABLET, EXTENDED RELEASE ORAL DAILY
Status: DISCONTINUED | OUTPATIENT
Start: 2024-10-05 | End: 2024-10-07

## 2024-10-05 RX ADMIN — HUMAN ALBUMIN MICROSPHERES AND PERFLUTREN 6 ML: 10; .22 INJECTION, SOLUTION INTRAVENOUS at 10:13

## 2024-10-05 RX ADMIN — ATORVASTATIN CALCIUM 40 MG: 40 TABLET, FILM COATED ORAL at 19:56

## 2024-10-05 RX ADMIN — METOPROLOL SUCCINATE 25 MG: 25 TABLET, EXTENDED RELEASE ORAL at 08:25

## 2024-10-05 RX ADMIN — ACETAMINOPHEN 1000 MG: 500 TABLET ORAL at 05:37

## 2024-10-05 RX ADMIN — HEPARIN SODIUM 1350 UNITS/HR: 10000 INJECTION, SOLUTION INTRAVENOUS at 04:22

## 2024-10-05 RX ADMIN — ASPIRIN 325 MG ORAL TABLET 325 MG: 325 PILL ORAL at 08:25

## 2024-10-05 RX ADMIN — HEPARIN SODIUM 1350 UNITS/HR: 10000 INJECTION, SOLUTION INTRAVENOUS at 09:14

## 2024-10-05 ASSESSMENT — ACTIVITIES OF DAILY LIVING (ADL)
ADLS_ACUITY_SCORE: 21

## 2024-10-05 NOTE — PROGRESS NOTES
[facility]    Medicine Progress Note - Hospitalist Service    Date of Admission:  10/4/2024    Primary Care Physician   Albert Chapman  CONSULTANTS: cardiology     Assessment & Plan     Phillip Nassar is a 59 year old male with past medical history significant for asthma and recurrent sinusitis who presented to [facility] on 10/4/2024 with mid-sternal chest pain.     Patient stated approximately 1 week ago he started to have symptoms of an upper respiratory infection with rhinorrhea and cough.  He reports that he has a history of recurrent sinusitis and therefore that is what he thought this was.  However, after few days he reported that it was not improving and therefore took a COVID test which returned positive.  He had been recovering at home and he reports that his COVID symptoms were improving.  However this morning he woke in noticed that he had some midsternal chest pain.  He thought it might have been some heartburn or muscle strain.  He took some ibuprofen, which she reported improved the pain.  He subsequently took a nap, and woke later feeling well.  However, later this afternoon at approximately 4 PM he developed recurrent midsternal chest pain.  He reported that this pain was worse than prior and decided to present to the emergency department.  He reports that his wife was driving him to the emergency department and he does that the pain was worsening.  It was not associated with any shortness of breath, dizziness.  He has not had any exertional symptoms or stuttering chest pain for the past.     In the emergency department patient was afebrile.  His heart rate 82-93.  Blood pressure is 135-143/86-90.  He had normal oxygen saturations on room air.  Laboratory studies were largely within normal limits with the exception of troponin 710 increasing to 795.  He also had a positive COVID PCR.  CXR without acute pathology.  ECG with T wave inversions lead III and V1,  as well as slight ST depression in V1.  He was provided with ASA and started on heparin gtt., as well as a nitro patch.  He has been admitted for further cares of NSTEMI.     NSTEMI vs other (myocarditis, pericarditis)  Presented with midsternal chest pain found to have troponin 710--> 795 in the setting of an acute COVID infection.  ECG with T wave inversions in lead III and V1, and slight ST depression in V1. Was given ASA on arrival to ER. Started on heparin gtt. and Nitropatch in the emergency department, which was continued on admission.  TTE ordered for the morning, as well as cardiology consultation.  Patient without prior cardiac history, however did have COVID recently. Continue on a apirin, started on statin and beta blocker.  Check fasting lipids.  Given COVID, ? If this could be myocarditis or pericarditis.  Echo pending.  Follow on telemetry.  Chest pain current resolved. Is NPO for possible angiogram.  Troponins in the 700s.        COVID-19 Infection  Tested positive for COVID-19 approximately 5 days prior to admission.  He states that he is recovering well at home until today when he started to have chest pain.  From a COVID standpoint reported that he is feeling improved.  No need for antivirals/steroids at this point. Is in isolation     Asthma  Albuterol nebs as needed     Discussed plan of care with nursing      Diet: NPO per Anesthesia Guidelines for Procedure/Surgery Except for: Meds    DVT Prophylaxis: heparin drip  Almaguer Catheter: Not present  Lines: None     Cardiac Monitoring: ACTIVE order. Indication: Chest pain/ ACS rule out (24 hours)    RESTRAINTS: not indicated  Code Status: Full Code      This document was created using voice recognition technology.  Please excuse any typographical errors that may have occurred.  Please call with any questions.         # Hypocalcemia: Lowest Ca = 8.5 mg/dL in last 2 days, will monitor and replace as appropriate                     # Asthma: noted on  problem list        Disposition Plan      Expected Discharge Date: 10/06/2024                  Barrier to discharge: cardiac work up  Medically Ready for Discharge: Anticipated in 2-4 Days      Josue San MD  Hospitalist Service  [facility]  Securely message with [employer] (more info)  Text page via [employer] Paging/Directory   ______________________________________________________________________    Interval History   Patient is new to me.  Here with severe substernal chest pain associated with nausea but no sob, no radiation, nor diaphoresis.  Never had before.  Today has a headache treated with tylenol. CP has resolved currently.  Awaiting cardiology to see      ROS: A comprehensive review of systems was negative except for items noted in the HPI.  Patient currently denies any fever, chills, sweats, nausea, vomiting, diarrhea, shortness of breath, or chest pain.      Physical Exam   Vital Signs: Temp: 98.1  F (36.7  C) Temp src: Oral BP: (!) 143/89 Pulse: 86   Resp: 17 SpO2: 95 % O2 Device: None (Room air)    Weight: 219 lbs 1.6 oz      General appearance: Patient is alert and oriented x3, no apparent distress, pleasant and conversing normally, speaking in full sentences, appears stated age  HEENT:  Mucous membranes are moist  RESPIRATORY: Clear to auscultation bilateral, good air movement  Chest: no palpable chest pain  CARDIOVASCULAR: Regular rate and rhythm, normal S1/S2, no murmurs   GASTROINTESTINAL: Non-distended, non-tender, soft, bowel sounds present throughout  NEUROLOGIC:  Cranial nerves II-XII intact, without any focal deficits, strength 5/5 throughout  EXTREMITIES:  Moves all extremities, no clubbing, cyanosis, nor edema  :  Tripp not present         Data     I have personally reviewed the following data over the past 24 hrs:    5.8  \   14.2   / 178     138 102 12.0 /  98   3.9 21 (L) 0.83 \     ALT: 36 AST: 69 (H) AP: 55 TBILI: 0.4   ALB: 3.7 TOT PROTEIN: 6.3 (L) LIPASE: N/A      Trop: 765 () BNP: N/A       Imaging:   Results for orders placed or performed during the hospital encounter of 10/04/24   Chest XR,  PA & LAT    Narrative    EXAM: XR CHEST 2 VIEWS  LOCATION: [facility]  DATE: 10/4/2024    INDICATION: chest pain  COMPARISON: None.      Impression    IMPRESSION: Negative chest.     Procedures: echo/angiogram pending per cardiology     I have personally have reviewed the patient's most up to date radiologic exams, EKG, labs, orders, and medications myself

## 2024-10-05 NOTE — PHARMACY-ADMISSION MEDICATION HISTORY
Pharmacy Intern Admission Medication History    Admission medication history is complete. The information provided in this note is only as accurate as the sources available at the time of the update.    Information Source(s): Patient and CareEverywhere/SureScripts via phone    Pertinent Information: The patient completed 2/7 days of Augmentin therapy     Changes made to PTA medication list:  Added: Sudafed   Deleted: None  Changed: None    Allergies reviewed with patient and updates made in EHR: no    Medication History Completed By: Maria Antonia Cee RPH 10/5/2024 12:17 PM    PTA Med List   Medication Sig Note Last Dose    albuterol (PROAIR HFA/PROVENTIL HFA/VENTOLIN HFA) 108 (90 Base) MCG/ACT inhaler Inhale 2-4 puffs into the lungs every 4 hours as needed for shortness of breath or wheezing  Past Week    amoxicillin-clavulanate (AUGMENTIN) 875-125 MG tablet Take 1 tablet by mouth 2 times daily for 7 days. 10/5/2024: Completed 2 days of treatment  Past Week    guaiFENesin-codeine (ROBITUSSIN AC) 100-10 MG/5ML solution Take 5 mLs by mouth every 4 hours as needed for cough.  Past Week    pseudoePHEDrine (SUDAFED) 120 MG 12 hr tablet Take 120 mg by mouth every 12 hours as needed for congestion.  Past Week    sildenafil (VIAGRA) 100 MG tablet take 1/2 to one tab daily as needed for erectile dysfunction. take 30 min to 4 hours before sexual activity  Unknown

## 2024-10-05 NOTE — PLAN OF CARE
"Blood pressure 112/70, pulse 75, temperature 98.7  F (37.1  C), temperature source Oral, resp. rate 18, weight 99.4 kg (219 lb 1.6 oz), SpO2 92%.     VSS while on Heparin drip running at 1500 units/hr into Right PIV.  A&Ox4.  See Echo results.  Cards to consult.  Denies CP, SOB, and Pain.  Independent in room.  Voiding.  Special Precautions maintained.  Continue with POC.      Goal Outcome Evaluation:      Plan of Care Reviewed With: patient    Overall Patient Progress: improvingOverall Patient Progress: improving    Outcome Evaluation: Pt denies pain, CP, and SOB.  Tele was SR with Echo completed and EF 55-60%.  NPO currently.      Problem: Adult Inpatient Plan of Care  Goal: Plan of Care Review  Description: The Plan of Care Review/Shift note should be completed every shift.  The Outcome Evaluation is a brief statement about your assessment that the patient is improving, declining, or no change.  This information will be displayed automatically on your shift  note.  Outcome: Progressing  Flowsheets (Taken 10/5/2024 1410)  Outcome Evaluation: Pt denies pain, CP, and SOB.  Tele was SR with Echo completed and EF 55-60%.  NPO currently.  Plan of Care Reviewed With: patient  Overall Patient Progress: improving  Goal: Patient-Specific Goal (Individualized)  Description: You can add care plan individualizations to a care plan. Examples of Individualization might be:  \"Parent requests to be called daily at 9am for status\", \"I have a hard time hearing out of my right ear\", or \"Do not touch me to wake me up as it startles  me\".  Outcome: Progressing  Goal: Absence of Hospital-Acquired Illness or Injury  Outcome: Progressing  Intervention: Identify and Manage Fall Risk  Recent Flowsheet Documentation  Taken 10/5/2024 1406 by Carroll Francois, RN  Safety Promotion/Fall Prevention: safety round/check completed  Taken 10/5/2024 2464 by Carroll Francois, RN  Safety Promotion/Fall Prevention:   safety round/check completed   room " organization consistent   room near nurse's station  Intervention: Prevent Skin Injury  Recent Flowsheet Documentation  Taken 10/5/2024 1406 by Carroll Francois RN  Body Position: position changed independently  Taken 10/5/2024 0829 by Carroll Francois RN  Body Position: position changed independently  Intervention: Prevent and Manage VTE (Venous Thromboembolism) Risk  Recent Flowsheet Documentation  Taken 10/5/2024 0829 by Carroll Francois RN  VTE Prevention/Management: (Pt ambulatory in room) SCDs off (sequential compression devices)  Intervention: Prevent Infection  Recent Flowsheet Documentation  Taken 10/5/2024 0829 by Carroll Francois RN  Infection Prevention:   personal protective equipment utilized   rest/sleep promoted   single patient room provided  Goal: Optimal Comfort and Wellbeing  Outcome: Progressing  Goal: Readiness for Transition of Care  Outcome: Progressing     Problem: Fall Injury Risk  Goal: Absence of Fall and Fall-Related Injury  Outcome: Progressing  Intervention: Promote Injury-Free Environment  Recent Flowsheet Documentation  Taken 10/5/2024 1406 by Carroll Francois RN  Safety Promotion/Fall Prevention: safety round/check completed  Taken 10/5/2024 0829 by Carroll Francois RN  Safety Promotion/Fall Prevention:   safety round/check completed   room organization consistent   room near nurse's station     Problem: Pain Acute  Goal: Optimal Pain Control and Function  Outcome: Progressing

## 2024-10-05 NOTE — CONSULTS
Inpatient Cardiology Consultation.   [facility]  Date of Admission: 10/4/2024  Date of Consult: October 5, 2024      DIAGNOSES/ASSESSMENT:    Non-ST elevation myocardial infarction versus myocarditis due to COVID infection.  Unrelenting severe chest pain 4 hours yesterday, significantly elevated troponin T but flat trajectory, normal ventricular function, no wall motion abnormalities.  Will need same admission coronary angiogram for diagnostic clarification.  Minimal cardiovascular risk factors.  No pertinent family history.  Differential includes myocarditis from COVID.  Current COVID infection with significant respiratory symptoms, myalgia starting a week ago.    PLAN:  I have ordered heart catheterization and intervention, as needed.  Since patient is stable without high risk features on imaging, this can be done on Monday.  Explained risks (MI, stroke, death, emergent heart surgery, vascular complications and bleeding) and benefits (diagnostic clarification, potential treatment of coronary disease). Informed consent obtained.  No history of contrast allergy.  No contraindication to dual antiplatelet therapy.  Normal renal function and CBC. Full code resuscitation status.    Please continue IV heparin, aspirin, beta-blocker and statin.  Expedited cardiac MRI as an outpatient.  Cardiology will follow on Monday.  If there are any urgent issues over the weekend, please page me.  Thank you for consulting us.  I personally updated patient's RN with the plan.      Brenda Hartley MD, MD FACC  Cardiology    Total time today 85 minutes.  High complexity medical decision making and care coordination.      CODE STATUS:  Full Code      REASON FOR CONSULT:  Non-ST elevation myocardial infarction.    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  He presented to the emergency room with chest pain and cardiology  is consulted for management of NSTEMI.    Patient developed significant symptoms of sinusitis, fevers, myalgia, headaches approximately a week ago with testing positive for COVID.  Yesterday morning, he developed retrosternal sharp chest pain, not radiating that lasted for an hour and resolved with ibuprofen.  He took a nap and woke up and had severe 10/0 chest pain that lasted for several work hours and woke him up from sleep at night so he presented to the ER.  In the ER tested positive for COVID, influenza/RSV negative.  Serial high-sensitivity troponin T is elevated but flat at 724 --765, LDL 75, creatinine normal at 0.83 with normal electrolytes.  Normal CBC without leukocytosis.  Hemoglobin 14.2.  Admission ECG showed normal sinus rhythm without ischemia or infarct.    Nontobacco user.  No other risk factors for CAD such as hypertension, hyperlipidemia or diabetes.      Echocardiogram is normal with LVEF of 60%, no wall motion abnormalities, normal RV function, no valve disease, no pericardial effusion.    On exam -appears unwell, nontoxic, has a persistent dry cough.  Mentally alert and oriented.  /70, pulse 75 bpm, regular heart sounds, no murmur, no carotid bruit.  No pericardial or pleural rub.  Lungs are clear to auscultation.  No lower extremity edema.      REVIEW OF SYSTEMS:  A comprehensive 10 point review of systems was completed and the pertinent positives are documented in history of present illness.    FAMILY HISTORY:  Family History   Problem Relation Age of Onset    Alzheimer Disease Mother     Diabetes Father     Colon Cancer Paternal Grandmother     Colon Cancer Paternal Aunt     Cancer - colorectal Paternal Aunt     Colon Cancer Paternal Uncle     Cancer - colorectal Paternal Uncle     Glaucoma No family hx of     Macular Degeneration No family hx of        MEDICATIONS:  Prior to Admission Medications   Prescriptions Last Dose Informant Patient Reported? Taking?   albuterol (PROAIR  HFA/PROVENTIL HFA/VENTOLIN HFA) 108 (90 Base) MCG/ACT inhaler   No No   Sig: Inhale 2-4 puffs into the lungs every 4 hours as needed for shortness of breath or wheezing   amoxicillin-clavulanate (AUGMENTIN) 875-125 MG tablet   No No   Sig: Take 1 tablet by mouth 2 times daily for 7 days.   guaiFENesin-codeine (ROBITUSSIN AC) 100-10 MG/5ML solution   No No   Sig: Take 5 mLs by mouth every 4 hours as needed for cough.   sildenafil (VIAGRA) 100 MG tablet   No No   Sig: take 1/2 to one tab daily as needed for erectile dysfunction. take 30 min to 4 hours before sexual activity      Facility-Administered Medications: None       HOME MEDICATIONS:  Prior to Admission Medications   Prescriptions Last Dose Informant Patient Reported? Taking?   albuterol (PROAIR HFA/PROVENTIL HFA/VENTOLIN HFA) 108 (90 Base) MCG/ACT inhaler   No No   Sig: Inhale 2-4 puffs into the lungs every 4 hours as needed for shortness of breath or wheezing   amoxicillin-clavulanate (AUGMENTIN) 875-125 MG tablet   No No   Sig: Take 1 tablet by mouth 2 times daily for 7 days.   guaiFENesin-codeine (ROBITUSSIN AC) 100-10 MG/5ML solution   No No   Sig: Take 5 mLs by mouth every 4 hours as needed for cough.   sildenafil (VIAGRA) 100 MG tablet   No No   Sig: take 1/2 to one tab daily as needed for erectile dysfunction. take 30 min to 4 hours before sexual activity      Facility-Administered Medications: None       ALLERGIES:  Allergies   Allergen Reactions    No Known Drug Allergy        PAST MEDICAL HISTORY:  Past Medical History:   Diagnosis Date    Allergic rhinitis     Diverticulitis     Mild intermittent asthma        PAST SURGICAL HISTORY:  Past Surgical History:   Procedure Laterality Date    ARTHROSCOPY KNEE      COLONOSCOPY  03/07/2014    Procedure: COLONOSCOPY;  Colonoscopy  ;  Surgeon: Gucci Martin MD;  Location:  GI    COLONOSCOPY N/A 02/28/2017    Procedure: COLONOSCOPY;  Surgeon: Destiny Burks MD;  Location:  GI    COLONOSCOPY N/A  01/30/2023    Procedure: COLONOSCOPY (crsal);  Surgeon: Destiny Burks MD;  Location:  GI    LASIK Bilateral     SINUS SURGERY         SOCIAL HISTORY:   Phillip Nassar  reports that he has never smoked. He has never used smokeless tobacco. He reports that he does not drink alcohol and does not use drugs.      PHYSICAL EXAMINATION:  Temp: 98.7  F (37.1  C) Temp src: Oral BP: 112/70 Pulse: 75   Resp: 18 SpO2: 92 % O2 Device: None (Room air)    No intake/output data recorded.  Vitals:    10/04/24 2312   Weight: 99.4 kg (219 lb 1.6 oz)       Clinically Significant Risk Factors Present on Admission          # Hypocalcemia: Lowest Ca = 8.5 mg/dL in last 2 days, will monitor and replace as appropriate                     # Asthma: noted on problem list     Not present on admission        Brenda Hartley MD, MD    This note was completed in part using dictation via the Dragon voice recognition software. Some word and grammatical errors may occur and must be interpreted in the appropriate clinical context. If there are any questions pertaining to this issue, please contact me for further clarification.

## 2024-10-05 NOTE — PLAN OF CARE
"Pt A&O x4. Mag, K re-check AM. Nitropatch intact on left upper arm. Heparin gtt @1350 units/hr. MARGARETTE planned AM.     Problem: Adult Inpatient Plan of Care  Goal: Plan of Care Review  Description: The Plan of Care Review/Shift note should be completed every shift.  The Outcome Evaluation is a brief statement about your assessment that the patient is improving, declining, or no change.  This information will be displayed automatically on your shift  note.  Outcome: Progressing  Flowsheets (Taken 10/5/2024 0345)  Outcome Evaluation: Denies chest pain, SOB.Tele: SR. MARGARETTE planned for today. NPO  Plan of Care Reviewed With: patient  Overall Patient Progress: no change  Goal: Patient-Specific Goal (Individualized)  Description: You can add care plan individualizations to a care plan. Examples of Individualization might be:  \"Parent requests to be called daily at 9am for status\", \"I have a hard time hearing out of my right ear\", or \"Do not touch me to wake me up as it startles  me\".  Outcome: Progressing  Goal: Absence of Hospital-Acquired Illness or Injury  Outcome: Progressing  Intervention: Identify and Manage Fall Risk  Recent Flowsheet Documentation  Taken 10/4/2024 2312 by Martha Lemon RN  Safety Promotion/Fall Prevention:   safety round/check completed   clutter free environment maintained   nonskid shoes/slippers when out of bed  Intervention: Prevent Skin Injury  Recent Flowsheet Documentation  Taken 10/4/2024 2312 by Martha Lemon RN  Body Position: position changed independently  Intervention: Prevent and Manage VTE (Venous Thromboembolism) Risk  Recent Flowsheet Documentation  Taken 10/4/2024 2312 by Martha Lemon RN  VTE Prevention/Management: SCDs off (sequential compression devices)  Intervention: Prevent Infection  Recent Flowsheet Documentation  Taken 10/4/2024 2312 by Martha Lemon RN  Infection Prevention:   rest/sleep promoted   single patient room provided  Goal: Optimal Comfort and Wellbeing  Outcome: " Progressing  Goal: Readiness for Transition of Care  Outcome: Progressing  Intervention: Mutually Develop Transition Plan  Recent Flowsheet Documentation  Taken 10/4/2024 2300 by Martha Lemon, RN  Equipment Currently Used at Home: none     Problem: Fall Injury Risk  Goal: Absence of Fall and Fall-Related Injury  Outcome: Progressing  Intervention: Promote Injury-Free Environment  Recent Flowsheet Documentation  Taken 10/4/2024 2312 by Martha Lemon, RN  Safety Promotion/Fall Prevention:   safety round/check completed   clutter free environment maintained   nonskid shoes/slippers when out of bed     Problem: Pain Acute  Goal: Optimal Pain Control and Function  Outcome: Progressing   Goal Outcome Evaluation:      Plan of Care Reviewed With: patient    Overall Patient Progress: no changeOverall Patient Progress: no change    Outcome Evaluation: Denies chest pain, SOB.Tele: SR. PFEIFFER planned for today. NPO

## 2024-10-05 NOTE — ED NOTES
[facility]  ED Nurse Handoff Report    ED Chief complaint: Chest Pain  . ED Diagnosis:   Final diagnoses:   NSTEMI (non-ST elevated myocardial infarction) (H)       Allergies:   Allergies   Allergen Reactions    No Known Drug Allergy        Code Status: Full Code    Activity level - Baseline/Home:  independent.  Activity Level - Current:   standby.   Lift room needed: No.   Bariatric: No   Needed: No   Isolation: Yes.   Infection: Not Applicable  COVID r/o and special precautions.     Respiratory status: Room air    Vital Signs (within 30 minutes):   Vitals:    10/04/24 1843 10/04/24 2149 10/04/24 2151 10/04/24 2200   BP: (!) 139/90 135/86  (!) 141/96   Pulse: 93 78 82 91   Resp: 22 13 20 20   Temp: 99.2  F (37.3  C)      TempSrc: Temporal      SpO2: 95%  97% 93%       Cardiac Rhythm:  ,   Cardiac  Cardiac Rhythm: Normal sinus rhythm  Pain level:    Patient confused: No.   Patient Falls Risk: nonskid shoes/slippers when out of bed, arm band in place, patient and family education, assistive device/personal items within reach, and activity supervised.   Elimination Status: Has voided     Patient Report - Initial Complaint: Chest pain.   Focused Assessment: Phillip Nassar is a 59 year old male presents for evaluation of 2 episodes of substernal chest pain today.  EKG showed sinus rhythm without acute ischemic change.  Troponin returns elevated today.  Chest x-ray negative for pneumothorax, pneumonia, effusion.  Mediastinum appears normal.  Based on pain pattern and presentation, this is consistent with ACS.nstemi.  Patient remained chest pain-free throughout his emergency department stay.  Aspirin given.  Nitro glycerin patch placed for prevention of recurrent anginal symptoms.  Heparin started.  Patient denied any antecedent bleeding symptoms.  Hemoglobin normal.  No tachycardia or hypoxia or pleuritic pain to suggest PE.  Based on resolution of pain and normal mediastinum and intact  peripheral pulses, did not feel this was likely to be a dissection.  Discussed with Dr. Pate for the hospitalist team.            Abnormal Results:   Labs Ordered and Resulted from Time of ED Arrival to Time of ED Departure   BASIC METABOLIC PANEL - Abnormal       Result Value    Sodium 135      Potassium 4.1      Chloride 98      Carbon Dioxide (CO2) 22      Anion Gap 15      Urea Nitrogen 13.9      Creatinine 0.96      GFR Estimate >90      Calcium 8.6 (*)     Glucose 103 (*)    TROPONIN T, HIGH SENSITIVITY - Abnormal    Troponin T, High Sensitivity 710 (*)    INFLUENZA A/B, RSV, & SARS-COV2 PCR - Abnormal    Influenza A PCR Negative      Influenza B PCR Negative      RSV PCR Negative      SARS CoV2 PCR Positive (*)    CBC WITH PLATELETS AND DIFFERENTIAL    WBC Count 8.4      RBC Count 5.36      Hemoglobin 15.2      Hematocrit 46.8      MCV 87      MCH 28.4      MCHC 32.5      RDW 13.6      Platelet Count 173      % Neutrophils 74      % Lymphocytes 11      % Monocytes 12      % Eosinophils 3      % Basophils 0      % Immature Granulocytes 0      NRBCs per 100 WBC 0      Absolute Neutrophils 6.3      Absolute Lymphocytes 0.9      Absolute Monocytes 1.0      Absolute Eosinophils 0.2      Absolute Basophils 0.0      Absolute Immature Granulocytes 0.0      Absolute NRBCs 0.0     TROPONIN T, HIGH SENSITIVITY        Chest XR,  PA & LAT   Final Result   IMPRESSION: Negative chest.          Treatments provided: See MAR  Family Comments: Spouse at bedside.   OBS brochure/video discussed/provided to patient:  No  ED Medications:   Medications   nitroGLYcerin (NITRODUR) 0.4 MG/HR 24 hr patch 1 patch (1 patch Transdermal $Patch/Med Applied 10/4/24 2146)   heparin 25,000 units in 0.45% NaCl 250 mL ANTICOAGULANT infusion (1,200 Units/hr Intravenous $New Bag 10/4/24 2143)   aspirin (ASA) tablet 325 mg (325 mg Oral $Given 10/4/24 2143)   heparin ANTICOAGULANT loading dose for  LOW INTENSITY TREATMENT* Give BEFORE starting  heparin infusion (6,000 Units Intravenous $Given 10/4/24 4133)       Drips infusing:  Yes  For the majority of the shift this patient was Green.   Interventions performed were N/A.    Sepsis treatment initiated: No    Cares/treatment/interventions/medications to be completed following ED care: N/A    ED Nurse Name: Aura Andrews RN  10:31 PM     RECEIVING UNIT ED HANDOFF REVIEW    Above ED Nurse Handoff Report was reviewed: Yes  Reviewed by: Martha Lemon RN on October 4, 2024 at 10:39 PM   ANDI Rob called the ED to inform them the note was read: No

## 2024-10-05 NOTE — H&P
[facility]    History and Physical - Hospitalist Service       Date of Admission:  10/4/2024    Assessment & Plan      Phillip Nassar is a 59 year old male with past medical history significant for asthma and recurrent sinusitis who presented to [facility] on 10/4/2024 with mid-sternal chest pain and was found to have NSTEMI.    NSTEMI  Presented with midsternal chest pain found to have troponin 710--> 795.  ECG with T wave inversions in lead III and V1, and slight ST depression in V1. Was given ASA on arrival to ER. Started on heparin gtt. and Nitropatch in the emergency department, which was continued on admission.  TTE ordered for the morning, as well as cardiology consultation.  Patient without prior cardiac history, however did have COVID recently.  -Cardiology consulted, appreciate recommendations  -Heparin GTT  -Nitropatch in place  -TTE in morning  -Cardiac telemetry  -k>4, mg>2  -Trend troponin  -Checking repeat ECG    COVID-19 Infection  Tested positive for COVID-19 approximately 5 days ago.  He states that he is recovering well at home until today when he started to have chest pain.  From a COVID standpoint reported that he is feeling improved.        Diet:  NPO  DVT Prophylaxis: heparin gtt  Almaguer Catheter: Not present  Lines: None     Cardiac Monitoring: None  Code Status:  Full    Clinically Significant Risk Factors Present on Admission                              # Asthma: noted on problem list        Disposition Plan     Medically Ready for Discharge: Anticipated in 2-4 Days           Jose Pate MD  Hospitalist Service  [facility]  Securely message with [employer] (more info)  Text page via [employer] Paging/Directory     ______________________________________________________________________    Chief Complaint   Chest Pain    History is obtained from the patient    History of Present Illness   Phillip Nassar is a 59 year old male with past medical  history significant for asthma and recurrent sinusitis who presented to [facility] on 10/4/2024 with mid-sternal chest pain.    Patient stated approximately 1 week ago he started to have symptoms of an upper respiratory infection with rhinorrhea and cough.  He reports that he has a history of recurrent sinusitis and therefore that is what he thought this was.  However, after few days he reported that it was not improving and therefore took a COVID test which returned positive.  He had been recovering at home and he reports that his COVID symptoms were improving.  However this morning he woke in noticed that he had some midsternal chest pain.  He thought it might have been some heartburn or muscle strain.  He took some ibuprofen, which she reported improved the pain.  He subsequently took a nap, and woke later feeling well.  However, later this afternoon at approximately 4 PM he developed recurrent midsternal chest pain.  He reported that this pain was worse than prior and decided to present to the emergency department.  He reports that his wife was driving him to the emergency department and he does that the pain was worsening.  It was not associated with any shortness of breath, dizziness.  He has not had any exertional symptoms or stuttering chest pain for the past.    In the emergency department patient was afebrile.  His heart rate 82-93.  Blood pressure is 135-143/86-90.  He had normal oxygen saturations on room air.  Laboratory studies were largely within normal limits with the exception of troponin 710 increasing to 795.  He also had a positive COVID PCR.  CXR without acute pathology.  ECG with T wave inversions lead III and V1, as well as slight ST depression in V1.  He was provided with ASA and started on heparin gtt., as well as a nitro patch.  He has been admitted for further cares of NSTEMI.      Past Medical History    Past Medical History:   Diagnosis Date    Allergic rhinitis      "Diverticulitis     Mild intermittent asthma        Past Surgical History   Past Surgical History:   Procedure Laterality Date    ARTHROSCOPY KNEE      COLONOSCOPY  03/07/2014    Procedure: COLONOSCOPY;  Colonoscopy  ;  Surgeon: Gucci Martin MD;  Location:  GI    COLONOSCOPY N/A 02/28/2017    Procedure: COLONOSCOPY;  Surgeon: Destiny Burks MD;  Location:  GI    COLONOSCOPY N/A 01/30/2023    Procedure: COLONOSCOPY (crsal);  Surgeon: Destiny Burks MD;  Location:  GI    LASIK Bilateral     SINUS SURGERY         Prior to Admission Medications   Prior to Admission Medications   Prescriptions Last Dose Informant Patient Reported? Taking?   albuterol (PROAIR HFA/PROVENTIL HFA/VENTOLIN HFA) 108 (90 Base) MCG/ACT inhaler   No No   Sig: Inhale 2-4 puffs into the lungs every 4 hours as needed for shortness of breath or wheezing   amoxicillin-clavulanate (AUGMENTIN) 875-125 MG tablet   No No   Sig: Take 1 tablet by mouth 2 times daily for 7 days.   guaiFENesin-codeine (ROBITUSSIN AC) 100-10 MG/5ML solution   No No   Sig: Take 5 mLs by mouth every 4 hours as needed for cough.   sildenafil (VIAGRA) 100 MG tablet   No No   Sig: take 1/2 to one tab daily as needed for erectile dysfunction. take 30 min to 4 hours before sexual activity      Facility-Administered Medications: None        Physical Exam   Temp: 98.1  F (36.7  C) Temp src: Oral BP: (!) 143/89 Pulse: 86   Resp: 17 SpO2: 95 % O2 Device: None (Room air)        Estimated body mass index is 33.43 kg/m  as calculated from the following:    Height as of 1/26/24: 1.73 m (5' 8.11\").    Weight as of 1/26/24: 100.1 kg (220 lb 9.6 oz).    General: Very pleasant male resting comfortably in hospital bed.  Awake, alert, interactive.  HEENT: Normocephalic, atraumatic.  PERRL, EOMI.  Conjunctiva clear, sclerae anicteric.  Mucous membranes moist.  Blue hair.  Cardiac: Regular rate and rhythm without murmur, gallop, or rub.  No peripheral edema.  Respiratory: " Normal work of breathing.  Clear to auscultation bilaterally without wheezing, rales, or rhonchi.  GI: Normal, active bowel sounds.  Abdomen soft, nontender, nondistended.  : Deferred.  Musculoskeletal: Moving all extremities appropriately.  Skin: No rashes or abrasions on exposed skin.  Neurologic: Alert and oriented x4.  Cranial nerves II through XII grossly intact.  Psychologic: Appropriate mood and affect.      Medical Decision Making       65 MINUTES SPENT BY ME on the date of service doing chart review, history, exam, documentation & further activities per the note.      Data     I have personally reviewed the following data over the past 24 hrs:    8.4  \   15.2   / 173     135 98 13.9 /  103 (H)   4.1 22 0.96 \     Trop: 795 (HH) BNP: N/A       Imaging results reviewed over the past 24 hrs:   Recent Results (from the past 24 hour(s))   Chest XR,  PA & LAT    Narrative    EXAM: XR CHEST 2 VIEWS  LOCATION: [facility]  DATE: 10/4/2024    INDICATION: chest pain  COMPARISON: None.      Impression    IMPRESSION: Negative chest.

## 2024-10-05 NOTE — ED PROVIDER NOTES
Emergency Department Note      History of Present Illness     Chief Complaint   Chest Pain    HPI   Phillip Nassar is a 59 year old male with a history as noted below who presents to the emergency department for chest pain. The patient states that 5 days ago, he tested positive for Covid. He reports that this morning, he began experiencing an onset of a non radiating substernal chest pain. He rated this pain this morning, as a 6/10 and notes that he drank water, ate food, and took ibuprofen with slight improvement of his pain. He reports that this pain then returned tonight, rating this pain as a 10/10 in severity that woke him from sleep. He notes that this episode improved upon emergency department arrival and denies any chest pain currently in the emergency department room. He reports that he has also been experiencing myalgias that he attributes to Covid. He reports that he was nauseated earlier but no vomiting. No black or bloody stools. Denies any past cardiac issues. He is taking Augmentin as he was initially diagnosed with sinusitis.    Independent Historian   None    Review of External Notes       Past Medical History     Medical History and Problem List   Allergic rhinitis  Diverticulitis  Asthma  SBO  DJD    Medications   albuterol (PROAIR HFA/PROVENTIL HFA/VENTOLIN HFA) 108 (90 Base) MCG/ACT inhaler  amoxicillin-clavulanate (AUGMENTIN) 875-125 MG tablet  guaiFENesin-codeine (ROBITUSSIN AC) 100-10 MG/5ML solution  sildenafil (VIAGRA) 100 MG tablet    Surgical History   Lasik  Sinus surgery    Physical Exam     Patient Vitals for the past 24 hrs:   BP Temp Temp src Pulse Resp SpO2   10/04/24 1843 (!) 139/90 99.2  F (37.3  C) Temporal 93 22 95 %     Physical Exam  Gen: alert  Neck: normal ROM  CV: RRR, 2+ distal pulses in all 4 extremities  Chest: no tenderness over the chest wall  Pulm: breath sounds equal, lungs clear  Abd: Soft, nontender  Back: no evidence of injury  MSK: no lower extremity edema, no  calf tenderness  Skin: no rash  Neuro: alert, appropriate conversation and interaction    Diagnostics     Lab Results   Labs Ordered and Resulted from Time of ED Arrival to Time of ED Departure   BASIC METABOLIC PANEL - Abnormal       Result Value    Sodium 135      Potassium 4.1      Chloride 98      Carbon Dioxide (CO2) 22      Anion Gap 15      Urea Nitrogen 13.9      Creatinine 0.96      GFR Estimate >90      Calcium 8.6 (*)     Glucose 103 (*)    TROPONIN T, HIGH SENSITIVITY - Abnormal    Troponin T, High Sensitivity 710 (*)    INFLUENZA A/B, RSV, & SARS-COV2 PCR - Abnormal    Influenza A PCR Negative      Influenza B PCR Negative      RSV PCR Negative      SARS CoV2 PCR Positive (*)    CBC WITH PLATELETS AND DIFFERENTIAL    WBC Count 8.4      RBC Count 5.36      Hemoglobin 15.2      Hematocrit 46.8      MCV 87      MCH 28.4      MCHC 32.5      RDW 13.6      Platelet Count 173      % Neutrophils 74      % Lymphocytes 11      % Monocytes 12      % Eosinophils 3      % Basophils 0      % Immature Granulocytes 0      NRBCs per 100 WBC 0      Absolute Neutrophils 6.3      Absolute Lymphocytes 0.9      Absolute Monocytes 1.0      Absolute Eosinophils 0.2      Absolute Basophils 0.0      Absolute Immature Granulocytes 0.0      Absolute NRBCs 0.0     TROPONIN T, HIGH SENSITIVITY     Imaging   Chest XR,  PA & LAT   Final Result   IMPRESSION: Negative chest.        EKG   ECG results from 10/04/24   EKG 12-lead, tracing only     Value    Systolic Blood Pressure     Diastolic Blood Pressure     Ventricular Rate 88    Atrial Rate 88    NC Interval 148    QRS Duration 80        QTc 399    P Axis 37    R AXIS 65    T Axis 11    Interpretation ECG      Sinus rhythm  Normal ECG  No previous ECGs available       Independent Interpretation   CXR: No pneumothorax, infiltrate, or pleural effusion.    ED Course      Medications Administered   Medications   nitroGLYcerin (NITRODUR) 0.4 MG/HR 24 hr patch 1 patch (1 patch  Transdermal $Patch/Med Applied 10/4/24 2146)   heparin 25,000 units in 0.45% NaCl 250 mL ANTICOAGULANT infusion (1,200 Units/hr Intravenous $New Bag 10/4/24 2143)   aspirin (ASA) tablet 325 mg (325 mg Oral $Given 10/4/24 2143)   heparin ANTICOAGULANT loading dose for  LOW INTENSITY TREATMENT* Give BEFORE starting heparin infusion (6,000 Units Intravenous $Given 10/4/24 2142)     Discussion of Management   Admitting Hospitalist, Dr. Pate    ED Course   ED Course as of 10/04/24 2153   Fri Oct 04, 2024   2017 I obtained history and examined the patient as noted above.      2118 I updated the patient. He remains chest pain free.     2151 I spoke with admitting hospitalist, Dr. Pate, regarding the patient. She accepts patient for admission.       Additional Documentation  None    Medical Decision Making / Diagnosis     CMS Diagnoses: None    MIPS  None    MDM   Phillip Nassar is a 59 year old male presents for evaluation of 2 episodes of substernal chest pain today.  EKG showed sinus rhythm without acute ischemic change.  Troponin returns elevated today.  Chest x-ray negative for pneumothorax, pneumonia, effusion.  Mediastinum appears normal.  Based on pain pattern and presentation, this is consistent with ACS.nstemi.  Patient remained chest pain-free throughout his emergency department stay.  Aspirin given.  Nitro glycerin patch placed for prevention of recurrent anginal symptoms.  Heparin started.  Patient denied any antecedent bleeding symptoms.  Hemoglobin normal.  No tachycardia or hypoxia or pleuritic pain to suggest PE.  Based on resolution of pain and normal mediastinum and intact peripheral pulses, did not feel this was likely to be a dissection.  Discussed with Dr. Pate for the hospitalist team.              Disposition   The patient was admitted to the hospital.     Diagnosis     ICD-10-CM    1. NSTEMI (non-ST elevated myocardial infarction) (H)  I21.4         Scribe Disclosure:  Scooter ESCAMILLA, am serving  as a scribe at 8:21 PM on 10/4/2024 to document services personally performed by Sabina Edmonds MD based on my observations and the provider's statements to me.        Sabina Edmonds MD  10/04/24 7717

## 2024-10-06 LAB
HOLD SPECIMEN: NORMAL
MAGNESIUM SERPL-MCNC: 2 MG/DL (ref 1.7–2.3)
POTASSIUM SERPL-SCNC: 4 MMOL/L (ref 3.4–5.3)
TROPONIN T SERPL HS-MCNC: 852 NG/L
UFH PPP CHRO-ACNC: 0.15 IU/ML
UFH PPP CHRO-ACNC: 0.28 IU/ML
UFH PPP CHRO-ACNC: 0.32 IU/ML

## 2024-10-06 PROCEDURE — 99232 SBSQ HOSP IP/OBS MODERATE 35: CPT | Performed by: INTERNAL MEDICINE

## 2024-10-06 PROCEDURE — 120N000001 HC R&B MED SURG/OB

## 2024-10-06 PROCEDURE — 84484 ASSAY OF TROPONIN QUANT: CPT | Performed by: INTERNAL MEDICINE

## 2024-10-06 PROCEDURE — 250N000013 HC RX MED GY IP 250 OP 250 PS 637: Performed by: INTERNAL MEDICINE

## 2024-10-06 PROCEDURE — 83735 ASSAY OF MAGNESIUM: CPT | Performed by: INTERNAL MEDICINE

## 2024-10-06 PROCEDURE — 85520 HEPARIN ASSAY: CPT | Performed by: STUDENT IN AN ORGANIZED HEALTH CARE EDUCATION/TRAINING PROGRAM

## 2024-10-06 PROCEDURE — 84132 ASSAY OF SERUM POTASSIUM: CPT | Performed by: INTERNAL MEDICINE

## 2024-10-06 PROCEDURE — 250N000011 HC RX IP 250 OP 636: Performed by: EMERGENCY MEDICINE

## 2024-10-06 PROCEDURE — 36415 COLL VENOUS BLD VENIPUNCTURE: CPT | Performed by: INTERNAL MEDICINE

## 2024-10-06 PROCEDURE — 85520 HEPARIN ASSAY: CPT | Performed by: INTERNAL MEDICINE

## 2024-10-06 PROCEDURE — 36415 COLL VENOUS BLD VENIPUNCTURE: CPT | Performed by: STUDENT IN AN ORGANIZED HEALTH CARE EDUCATION/TRAINING PROGRAM

## 2024-10-06 RX ORDER — ASPIRIN 81 MG/1
81 TABLET, CHEWABLE ORAL EVERY MORNING
Status: DISCONTINUED | OUTPATIENT
Start: 2024-10-07 | End: 2024-10-07

## 2024-10-06 RX ADMIN — ATORVASTATIN CALCIUM 40 MG: 40 TABLET, FILM COATED ORAL at 19:52

## 2024-10-06 RX ADMIN — HEPARIN SODIUM 1650 UNITS/HR: 10000 INJECTION, SOLUTION INTRAVENOUS at 14:11

## 2024-10-06 RX ADMIN — HEPARIN SODIUM 1650 UNITS/HR: 10000 INJECTION, SOLUTION INTRAVENOUS at 03:28

## 2024-10-06 RX ADMIN — METOPROLOL SUCCINATE 25 MG: 25 TABLET, EXTENDED RELEASE ORAL at 10:34

## 2024-10-06 RX ADMIN — ASPIRIN 325 MG ORAL TABLET 325 MG: 325 PILL ORAL at 10:35

## 2024-10-06 RX ADMIN — HEPARIN SODIUM 1500 UNITS/HR: 10000 INJECTION, SOLUTION INTRAVENOUS at 00:37

## 2024-10-06 ASSESSMENT — ACTIVITIES OF DAILY LIVING (ADL)
ADLS_ACUITY_SCORE: 20

## 2024-10-06 NOTE — PLAN OF CARE
Patient A & O x4. VSS on RA. Denies pain, SOB, or chest pain. Regular diet ordered per plan of heart cath to be done on Monday. Intake is good. Heparin drip running at 1,500 unit/hr. Next Heparin Anti XA lab draw check is ordered. Strict I's and O's. Independent in room. Covid positive with isolation precautions in place.       Goal Outcome Evaluation:      Plan of Care Reviewed With: patient    Overall Patient Progress: improvingOverall Patient Progress: improving    Outcome Evaluation: Patient denies CP or SOB.      Problem: Adult Inpatient Plan of Care  Goal: Plan of Care Review  Description: The Plan of Care Review/Shift note should be completed every shift.  The Outcome Evaluation is a brief statement about your assessment that the patient is improving, declining, or no change.  This information will be displayed automatically on your shift  note.  Outcome: Progressing  Flowsheets (Taken 10/5/2024 2237)  Outcome Evaluation: Patient denies CP or SOB.  Plan of Care Reviewed With: patient  Overall Patient Progress: improving

## 2024-10-06 NOTE — PROGRESS NOTES
[facility]    Medicine Progress Note - Hospitalist Service    Date of Admission:  10/4/2024    Primary Care Physician   Albert Chapman  CONSULTANTS: cardiology     Assessment & Plan     Phillip Nassar is a 59 year old male with past medical history significant for asthma and recurrent sinusitis who presented to [facility] on 10/4/2024 with mid-sternal chest pain.     Patient stated approximately 1 week ago he started to have symptoms of an upper respiratory infection with rhinorrhea and cough.  He reports that he has a history of recurrent sinusitis and therefore that is what he thought this was.  However, after few days he reported that it was not improving and therefore took a COVID test which returned positive.  He had been recovering at home and he reports that his COVID symptoms were improving.  However this morning he woke in noticed that he had some midsternal chest pain.  He thought it might have been some heartburn or muscle strain.  He took some ibuprofen, which she reported improved the pain.  He subsequently took a nap, and woke later feeling well.  However, later this afternoon at approximately 4 PM he developed recurrent midsternal chest pain.  He reported that this pain was worse than prior and decided to present to the emergency department.  He reports that his wife was driving him to the emergency department and he does that the pain was worsening.  It was not associated with any shortness of breath, dizziness.  He has not had any exertional symptoms or stuttering chest pain for the past.     In the emergency department patient was afebrile.  His heart rate 82-93.  Blood pressure is 135-143/86-90.  He had normal oxygen saturations on room air.  Laboratory studies were largely within normal limits with the exception of troponin 710 increasing to 795.  He also had a positive COVID PCR.  CXR without acute pathology.  ECG with T wave inversions lead III and V1,  as well as slight ST depression in V1.  He was provided with ASA and started on heparin gtt., as well as a nitro patch.  He has been admitted for further cares of NSTEMI.     NSTEMI vs other (myocarditis, pericarditis)  Presented with midsternal chest pain found to have troponin 710--> 795 in the setting of an acute COVID infection.  ECG with T wave inversions in lead III and V1, and slight ST depression in V1. Was given ASA on arrival to ER. Started on heparin gtt. and Nitropatch in the emergency department, which was continued on admission.  TTE ordered for the morning, as well as cardiology consultation.  Patient without prior cardiac history, however did have COVID recently. Continue on a apirin, started on statin and beta blocker.  HDL 36, .  Echo  with normal EF of 55-60% without any wall motion abnormalities.  Given COVID, ? If this could be myocarditis or pericarditis.    Follow on telemetry.  Chest pain current resolved.  Cardiology planning  angiogram 10/7/24.  Will be NPO at midnight.  Troponins in the 700s. May need a cardiac MRI as outpatient.         COVID-19 Infection  Tested positive for COVID-19 approximately 5 days prior to admission.  He states that he is recovering well at home until today when he started to have chest pain.  From a COVID standpoint reported that he is feeling improved.  No need for antivirals/steroids at this point. Is in isolation. Denies any symptoms    Asthma  Albuterol nebs as needed     Discussed plan of care with nursing, cardiology note reviewed      Diet: Regular Diet Adult  NPO for Medical/Clinical Reasons Except for: Meds    DVT Prophylaxis: heparin drip  Almaguer Catheter: Not present  Lines: None     Cardiac Monitoring: ACTIVE order. Indication: AMI (NSTEMI/ STEMI) (48 hours)    RESTRAINTS: not indicated  Code Status: Full Code      This document was created using voice recognition technology.  Please excuse any typographical errors that may have occurred.   Please call with any questions.         # Hypocalcemia: Lowest Ca = 8.5 mg/dL in last 2 days, will monitor and replace as appropriate                       # Asthma: noted on problem list        Disposition Plan     Expected Discharge Date: 10/06/2024                  Barrier to discharge: cardiac work up  Medically Ready for Discharge: Anticipated in 2-4 Days      Josue San MD  Hospitalist Service  [facility]  Securely message with [employer] (more info)  Text page via [employer] Paging/Directory   ______________________________________________________________________    Interval History   Patient is doing fine.  No chest pain or sob.  Planning angiogram tomorrow. In good spirits.        ROS: A comprehensive review of systems was negative except for items noted in the HPI.  Patient currently denies any fever, chills, sweats, nausea, vomiting, diarrhea, shortness of breath, or chest pain.      Physical Exam   Vital Signs: Temp: 98.6  F (37  C) Temp src: Oral BP: (!) 141/90 Pulse: 80   Resp: 18 SpO2: 94 % O2 Device: None (Room air)    Weight: 219 lbs 1.6 oz    Exam stable from yesterday  General appearance: Patient is alert and oriented x3, no apparent distress, lying in bed, pleasant and conversing normally, speaking in full sentences, appears stated age  HEENT:  Mucous membranes are moist  RESPIRATORY: Clear to auscultation bilateral, good air movement  Chest: no palpable chest pain  CARDIOVASCULAR: Regular rate and rhythm, normal S1/S2, no murmurs   GASTROINTESTINAL: Non-distended, non-tender, soft, bowel sounds present throughout  NEUROLOGIC:  Cranial nerves II-XII intact, without any focal deficits, strength 5/5 throughout  EXTREMITIES:  Moves all extremities, no clubbing, cyanosis, nor edema  :  Tripp not present         Data     I have personally reviewed the following data over the past 24 hrs:    N/A  \   N/A   / N/A     N/A N/A N/A /  N/A   4.0 N/A N/A \          Recent Results (from  the past 4320 hour(s))   Echocardiogram Complete   Result Value    LVEF  55-60%    [facility]    [id number]  [id number]  [id number]  [id number]^ALESSANDRA^JUNIOR     [facility]  Echocardiography Laboratory  [address] [place] [address]     Name: EARLINE WOODS  MRN: [id number]  : 1965  Study Date: 10/05/2024 09:49 AM  Age: 59 yrs  Gender: Male  Patient Location: [facility]  Reason For Study: MI - Acute  Ordering Physician: JUNIOR APARICIO  Performed By: TRAMAINE Shaffer     BSA: 2.1 m2  Height: 68 in  Weight: 219 lb  HR: 76  BP: 141/96 mmHg  ______________________________________________________________________________  Procedure  Complete Portable Echo Adult. Optison (NDC #1069-5504) given intravenously.  ______________________________________________________________________________  Interpretation Summary     Left ventricular systolic function is normal.  The LV ejection fraction is 55-60%.  No regional wall motion abnormalities.  Normal right ventricular size and systolic function.  No significant valve disease.     There is no comparison study available.  ______________________________________________________________________________  Left Ventricle  The left ventricle is normal in size. There is normal left ventricular wall  thickness. Left ventricular systolic function is normal. The visual ejection  fraction is 55-60%. Left ventricular diastolic function is normal. No regional  wall motion abnormalities noted.     Right Ventricle  The right ventricle is normal in size and function.     Atria  Normal left atrial size. Right atrial size is normal. There is no atrial shunt  seen.     Mitral Valve  The mitral valve leaflets appear normal. There is no evidence of stenosis,  fluttering, or prolapse. There is trace mitral regurgitation. There is no  mitral valve stenosis.     Tricuspid Valve  Normal tricuspid valve. There is trace tricuspid regurgitation. Right  ventricular systolic pressure could not be  approximated due to inadequate  tricuspid regurgitation.     Aortic Valve  The aortic valve is trileaflet with aortic valve sclerosis. There is trace  aortic regurgitation. No aortic stenosis is present.     Pulmonic Valve  There is trace pulmonic valvular regurgitation. Normal pulmonic valve  velocity.     Vessels  The aortic root is normal size. Normal size ascending aorta. The inferior vena  cava is normal.     Pericardium  There is no pericardial effusion.     Rhythm  Sinus rhythm was noted.  ______________________________________________________________________________  MMode/2D Measurements & Calculations     IVSd: 0.86 cm  LVIDd: 4.5 cm  LVIDs: 3.2 cm  LVPWd: 1.1 cm  FS: 29.4 %  LV mass(C)d: 148.0 grams  LV mass(C)dI: 69.7 grams/m2  Ao root diam: 3.8 cm  asc Aorta Diam: 3.2 cm  LVOT diam: 2.3 cm  LVOT area: 4.2 cm2  Ao root diam index Ht(cm/m): 2.2  Ao root diam index BSA (cm/m2): 1.8  Asc Ao diam index BSA (cm/m2): 1.5  Asc Ao diam index Ht(cm/m): 1.9  LA Volume (BP): 59.9 ml     LA Volume Index (BP): 28.3 ml/m2  RV Base: 3.2 cm  RWT: 0.47  TAPSE: 2.1 cm     Doppler Measurements & Calculations  MV E max max: 67.3 cm/sec  MV A max max: 64.3 cm/sec  MV E/A: 1.0  MV max P.2 mmHg  MV mean P.90 mmHg  MV V2 VTI: 18.1 cm  MV dec time: 0.20 sec  PA V2 max: 101.4 cm/sec  PA max P.1 mmHg  PA acc time: 0.12 sec  E/E' av.9  Lateral E/e': 7.9  Medial E/e': 9.8  RV S Max: 10.4 cm/sec     ______________________________________________________________________________  Report approved by: Dr Brenda Mcpherson 10/05/2024 01:03 PM                 Imaging:   Results for orders placed or performed during the hospital encounter of 10/04/24   Chest XR,  PA & LAT    Narrative    EXAM: XR CHEST 2 VIEWS  LOCATION: [facility]  DATE: 10/4/2024    INDICATION: chest pain  COMPARISON: None.      Impression    IMPRESSION: Negative chest.     Procedures: cardiac angiogram pending 10/7/24    I have  personally have reviewed the patient's most up to date radiologic exams, EKG, labs, orders, and medications myself

## 2024-10-06 NOTE — PROVIDER NOTIFICATION
RN writer texted Dr. San at 1525 & notified him of critical troponin 852. He responded to the text. No new orders or change of plan per MD.

## 2024-10-06 NOTE — PLAN OF CARE
Goal Outcome Evaluation:      Plan of Care Reviewed With: spouse, patient    Overall Patient Progress: no changeOverall Patient Progress: no change    Outcome Evaluation: No pain. Heparin at goal rate 1650unit/hr, recheck 10a in a.m. Verbal/written coronary angiogram education provided to the pt. The pt stated he will review the pamphlet. Up independent. RUlobe expiratory wheeze, other lobes clear. No complaints. NPO after midnoc for coronary angiogram tomorrow

## 2024-10-06 NOTE — PLAN OF CARE
"VSS, denies alesha, independent in room, regular diet will be NPO at midnight, heparin  running at 1650.  AO x4, RA, tele SR denies CP, continent of bowel and bladder. K4.0 and Mg 2.0 AM draws, HepXa 0.32 first value in range recheck around 16:30, Cardio consulted (see notes).  Plan for angio tomorrow.      Goal Outcome Evaluation:    Plan of Care Reviewed With: patient, spouse  Overall Patient Progress: improvingOverall Patient Progress: improving  Outcome Evaluation: heparin at 1650, plan for angio in AM    Problem: Adult Inpatient Plan of Care  Goal: Plan of Care Review  Description: The Plan of Care Review/Shift note should be completed every shift.  The Outcome Evaluation is a brief statement about your assessment that the patient is improving, declining, or no change.  This information will be displayed automatically on your shift  note.  Outcome: Progressing  Flowsheets (Taken 10/6/2024 1343)  Outcome Evaluation: heparin at 1650, plan for angio in AM  Plan of Care Reviewed With:   patient   spouse  Overall Patient Progress: improving  Goal: Patient-Specific Goal (Individualized)  Description: You can add care plan individualizations to a care plan. Examples of Individualization might be:  \"Parent requests to be called daily at 9am for status\", \"I have a hard time hearing out of my right ear\", or \"Do not touch me to wake me up as it startles  me\".  Outcome: Progressing  Goal: Absence of Hospital-Acquired Illness or Injury  Outcome: Progressing  Intervention: Identify and Manage Fall Risk  Recent Flowsheet Documentation  Taken 10/6/2024 1034 by Ioana Hsu, RN  Safety Promotion/Fall Prevention: safety round/check completed  Goal: Optimal Comfort and Wellbeing  Outcome: Progressing  Goal: Readiness for Transition of Care  Outcome: Progressing     Problem: Fall Injury Risk  Goal: Absence of Fall and Fall-Related Injury  Outcome: Progressing  Intervention: Promote Injury-Free Environment  Recent Flowsheet " Documentation  Taken 10/6/2024 1034 by Ioana Hsu, RN  Safety Promotion/Fall Prevention: safety round/check completed     Problem: Pain Acute  Goal: Optimal Pain Control and Function  Outcome: Progressing

## 2024-10-06 NOTE — PROGRESS NOTES
[facility].  Inpatient Cardiology Progress Note.  Date of Service: October 6, 2024      INTERVAL HISTORY:  Phillip Nassar is a 59 year old male admitted on 10/4/2024 with acute chest pain on a background of COVID infection for 1 week, elevated high-sensitivity troponin T of 700 (flat trajectory), normal echocardiogram.  Differential is type I NSTEMI versus myocarditis.    Has been chest pain-free.  Hemodynamically and rhythmically stable.  /85.  Pulse 81 bpm.  Creatinine 0.83, LDL 75, normal CBC with hemoglobin of 14.2, no leukocytosis.Troponin tday is up to 852.    DIAGNOSES/ASSESSMENT:  Non-ST elevation myocardial infarction versus myocarditis due to COVID infection.  Unrelenting severe chest pain for several hours yesterday, significantly elevated troponin T but flat trajectory, normal ventricular function, no wall motion abnormalities.  Will need coronary angiogram for diagnostic clarification.  Minimal cardiovascular risk factors.  No pertinent family history.  Differential includes myocarditis from COVID.  Current COVID infection with significant respiratory symptoms, myalgia starting a week ago.    PLAN:  I have consented him for coronary angiogram and intervention, as needed.  To be done tomorrow.  Please keep n.p.o. from midnight.  Expedited outpatient cardiac MRI has been ordered.  Please continue IV heparin, aspirin, beta-blocker, statin.  I personally updated patient's RN with the plan.  Cardiology will follow.      Brenda Hartley MD, MD FACC  Cardiology.    Total time today: 35 minutes.    This note was completed in part using dictation via the Dragon voice recognition software. Some word and grammatical errors may occur and must be interpreted in the appropriate clinical context. If there are any questions pertaining to this issue, please contact me for further clarification.       VITAL SIGNS:  Temp: 98.5  F (36.9  C) Temp src: Oral BP: 131/85 Pulse: 81   Resp: 18 SpO2:  94 % O2 Device: None (Room air)    10/01 0700 - 10/06 0659  In: 850 [P.O.:850]  Out: -   Net: 850  Vitals:    10/04/24 2312   Weight: 99.4 kg (219 lb 1.6 oz)

## 2024-10-06 NOTE — PLAN OF CARE
"Pt is alert and oriented. Pt denies any pain or shortness of breathe and on room air.    Heparin infusing at 1650 units/hr; recheck at 9am.    Tele: SR.     Pt ambulates independently to the bathroom. Ongoing monitoring.    Plan: Cardiology following.     BP (!) 141/90 (BP Location: Right arm, Patient Position: Sitting, Cuff Size: Adult Regular)   Pulse 80   Temp 98.6  F (37  C) (Oral)   Resp 18   Wt 99.4 kg (219 lb 1.6 oz)   SpO2 94%   BMI 33.21 kg/m       Problem: Adult Inpatient Plan of Care  Goal: Plan of Care Review  Description: The Plan of Care Review/Shift note should be completed every shift.  The Outcome Evaluation is a brief statement about your assessment that the patient is improving, declining, or no change.  This information will be displayed automatically on your shift  note.  Outcome: Progressing  Flowsheets (Taken 10/6/2024 0437)  Outcome Evaluation: Pt denies chest pain.  Plan of Care Reviewed With: patient  Overall Patient Progress: improving  Goal: Patient-Specific Goal (Individualized)  Description: You can add care plan individualizations to a care plan. Examples of Individualization might be:  \"Parent requests to be called daily at 9am for status\", \"I have a hard time hearing out of my right ear\", or \"Do not touch me to wake me up as it startles  me\".  Outcome: Progressing  Goal: Absence of Hospital-Acquired Illness or Injury  Outcome: Progressing  Intervention: Identify and Manage Fall Risk  Recent Flowsheet Documentation  Taken 10/6/2024 0400 by Rachael Palencia RN  Safety Promotion/Fall Prevention: safety round/check completed  Taken 10/6/2024 0300 by Rachael Palencia RN  Safety Promotion/Fall Prevention: safety round/check completed  Taken 10/6/2024 0200 by Rachael Palencia RN  Safety Promotion/Fall Prevention: safety round/check completed  Taken 10/6/2024 0100 by Rachael Palencia RN  Safety Promotion/Fall Prevention: safety round/check completed  Taken 10/6/2024 0038 by Talha, " Olubukola N, RN  Safety Promotion/Fall Prevention:   safety round/check completed   patient and family education   nonskid shoes/slippers when out of bed   lighting adjusted   activity supervised  Taken 10/6/2024 0000 by Rachael Palencia RN  Safety Promotion/Fall Prevention: safety round/check completed  Taken 10/5/2024 2300 by Rachael Palencia RN  Safety Promotion/Fall Prevention: safety round/check completed  Intervention: Prevent Skin Injury  Recent Flowsheet Documentation  Taken 10/6/2024 0038 by Rachael Palencia RN  Body Position: position changed independently  Goal: Optimal Comfort and Wellbeing  Outcome: Progressing  Goal: Readiness for Transition of Care  Outcome: Progressing     Problem: Fall Injury Risk  Goal: Absence of Fall and Fall-Related Injury  Outcome: Progressing  Intervention: Identify and Manage Contributors  Recent Flowsheet Documentation  Taken 10/6/2024 0038 by Rachael Palencia RN  Medication Review/Management: medications reviewed  Intervention: Promote Injury-Free Environment  Recent Flowsheet Documentation  Taken 10/6/2024 0400 by Rachael Palencia RN  Safety Promotion/Fall Prevention: safety round/check completed  Taken 10/6/2024 0300 by Rachael Palencia RN  Safety Promotion/Fall Prevention: safety round/check completed  Taken 10/6/2024 0200 by Rachael Palencia RN  Safety Promotion/Fall Prevention: safety round/check completed  Taken 10/6/2024 0100 by Rachael Palencia RN  Safety Promotion/Fall Prevention: safety round/check completed  Taken 10/6/2024 0038 by Rachael Palencia RN  Safety Promotion/Fall Prevention:   safety round/check completed   patient and family education   nonskid shoes/slippers when out of bed   lighting adjusted   activity supervised  Taken 10/6/2024 0000 by Rachael Palencia RN  Safety Promotion/Fall Prevention: safety round/check completed  Taken 10/5/2024 2300 by Rachael Palencia RN  Safety Promotion/Fall Prevention: safety round/check completed      Problem: Pain Acute  Goal: Optimal Pain Control and Function  Outcome: Progressing  Intervention: Prevent or Manage Pain  Recent Flowsheet Documentation  Taken 10/6/2024 0038 by Rachael Palencia RN  Medication Review/Management: medications reviewed   Goal Outcome Evaluation:      Plan of Care Reviewed With: patient    Overall Patient Progress: improvingOverall Patient Progress: improving    Outcome Evaluation: Pt denies chest pain.

## 2024-10-07 VITALS
HEART RATE: 76 BPM | SYSTOLIC BLOOD PRESSURE: 119 MMHG | OXYGEN SATURATION: 95 % | BODY MASS INDEX: 33.21 KG/M2 | WEIGHT: 219.1 LBS | DIASTOLIC BLOOD PRESSURE: 79 MMHG | TEMPERATURE: 97.9 F | RESPIRATION RATE: 20 BRPM

## 2024-10-07 LAB
ACT BLD: 127 SECONDS (ref 74–150)
ANION GAP SERPL CALCULATED.3IONS-SCNC: 18 MMOL/L (ref 7–15)
ATRIAL RATE - MUSE: 88 BPM
BUN SERPL-MCNC: 13.2 MG/DL (ref 6–20)
CALCIUM SERPL-MCNC: 9.3 MG/DL (ref 8.8–10.4)
CATH EF ESTIMATED: 60 %
CHLORIDE SERPL-SCNC: 100 MMOL/L (ref 98–107)
CREAT SERPL-MCNC: 0.92 MG/DL (ref 0.67–1.17)
DIASTOLIC BLOOD PRESSURE - MUSE: NORMAL MMHG
EGFRCR SERPLBLD CKD-EPI 2021: >90 ML/MIN/1.73M2
ERYTHROCYTE [DISTWIDTH] IN BLOOD BY AUTOMATED COUNT: 13.1 % (ref 10–15)
GLUCOSE SERPL-MCNC: 91 MG/DL (ref 70–99)
HCO3 SERPL-SCNC: 18 MMOL/L (ref 22–29)
HCT VFR BLD AUTO: 45.4 % (ref 40–53)
HGB BLD-MCNC: 14.6 G/DL (ref 13.3–17.7)
INTERPRETATION ECG - MUSE: NORMAL
MAGNESIUM SERPL-MCNC: 1.9 MG/DL (ref 1.7–2.3)
MCH RBC QN AUTO: 27.6 PG (ref 26.5–33)
MCHC RBC AUTO-ENTMCNC: 32.2 G/DL (ref 31.5–36.5)
MCV RBC AUTO: 86 FL (ref 78–100)
P AXIS - MUSE: 37 DEGREES
PLATELET # BLD AUTO: 240 10E3/UL (ref 150–450)
POTASSIUM SERPL-SCNC: 4.5 MMOL/L (ref 3.4–5.3)
POTASSIUM SERPL-SCNC: 4.6 MMOL/L (ref 3.4–5.3)
PR INTERVAL - MUSE: 148 MS
QRS DURATION - MUSE: 80 MS
QT - MUSE: 330 MS
QTC - MUSE: 399 MS
R AXIS - MUSE: 65 DEGREES
RBC # BLD AUTO: 5.29 10E6/UL (ref 4.4–5.9)
SODIUM SERPL-SCNC: 136 MMOL/L (ref 135–145)
SYSTOLIC BLOOD PRESSURE - MUSE: NORMAL MMHG
T AXIS - MUSE: 11 DEGREES
TROPONIN T SERPL HS-MCNC: 573 NG/L
UFH PPP CHRO-ACNC: 0.14 IU/ML
VENTRICULAR RATE- MUSE: 88 BPM
WBC # BLD AUTO: 8.1 10E3/UL (ref 4–11)

## 2024-10-07 PROCEDURE — 85347 COAGULATION TIME ACTIVATED: CPT

## 2024-10-07 PROCEDURE — 85520 HEPARIN ASSAY: CPT | Performed by: INTERNAL MEDICINE

## 2024-10-07 PROCEDURE — 272N000001 HC OR GENERAL SUPPLY STERILE: Performed by: INTERNAL MEDICINE

## 2024-10-07 PROCEDURE — 84484 ASSAY OF TROPONIN QUANT: CPT | Performed by: INTERNAL MEDICINE

## 2024-10-07 PROCEDURE — 83735 ASSAY OF MAGNESIUM: CPT | Performed by: INTERNAL MEDICINE

## 2024-10-07 PROCEDURE — 93458 L HRT ARTERY/VENTRICLE ANGIO: CPT | Performed by: INTERNAL MEDICINE

## 2024-10-07 PROCEDURE — 80048 BASIC METABOLIC PNL TOTAL CA: CPT | Performed by: INTERNAL MEDICINE

## 2024-10-07 PROCEDURE — B2151ZZ FLUOROSCOPY OF LEFT HEART USING LOW OSMOLAR CONTRAST: ICD-10-PCS | Performed by: INTERNAL MEDICINE

## 2024-10-07 PROCEDURE — 99238 HOSP IP/OBS DSCHRG MGMT 30/<: CPT | Performed by: INTERNAL MEDICINE

## 2024-10-07 PROCEDURE — 4A023N7 MEASUREMENT OF CARDIAC SAMPLING AND PRESSURE, LEFT HEART, PERCUTANEOUS APPROACH: ICD-10-PCS | Performed by: INTERNAL MEDICINE

## 2024-10-07 PROCEDURE — 93458 L HRT ARTERY/VENTRICLE ANGIO: CPT | Mod: 26 | Performed by: INTERNAL MEDICINE

## 2024-10-07 PROCEDURE — 258N000003 HC RX IP 258 OP 636: Performed by: INTERNAL MEDICINE

## 2024-10-07 PROCEDURE — 250N000013 HC RX MED GY IP 250 OP 250 PS 637: Performed by: INTERNAL MEDICINE

## 2024-10-07 PROCEDURE — 250N000011 HC RX IP 250 OP 636: Performed by: EMERGENCY MEDICINE

## 2024-10-07 PROCEDURE — 99152 MOD SED SAME PHYS/QHP 5/>YRS: CPT | Performed by: INTERNAL MEDICINE

## 2024-10-07 PROCEDURE — 250N000011 HC RX IP 250 OP 636: Performed by: INTERNAL MEDICINE

## 2024-10-07 PROCEDURE — 85027 COMPLETE CBC AUTOMATED: CPT | Performed by: EMERGENCY MEDICINE

## 2024-10-07 PROCEDURE — B2111ZZ FLUOROSCOPY OF MULTIPLE CORONARY ARTERIES USING LOW OSMOLAR CONTRAST: ICD-10-PCS | Performed by: INTERNAL MEDICINE

## 2024-10-07 PROCEDURE — 84132 ASSAY OF SERUM POTASSIUM: CPT | Performed by: INTERNAL MEDICINE

## 2024-10-07 PROCEDURE — 36415 COLL VENOUS BLD VENIPUNCTURE: CPT | Performed by: INTERNAL MEDICINE

## 2024-10-07 PROCEDURE — 250N000009 HC RX 250: Performed by: INTERNAL MEDICINE

## 2024-10-07 PROCEDURE — 99233 SBSQ HOSP IP/OBS HIGH 50: CPT | Mod: 25 | Performed by: INTERNAL MEDICINE

## 2024-10-07 RX ORDER — POTASSIUM CHLORIDE 1500 MG/1
20 TABLET, EXTENDED RELEASE ORAL
Status: DISCONTINUED | OUTPATIENT
Start: 2024-10-07 | End: 2024-10-07 | Stop reason: HOSPADM

## 2024-10-07 RX ORDER — ASPIRIN 81 MG/1
243 TABLET, CHEWABLE ORAL ONCE
Status: COMPLETED | OUTPATIENT
Start: 2024-10-07 | End: 2024-10-07

## 2024-10-07 RX ORDER — LORAZEPAM 0.5 MG/1
0.5 TABLET ORAL
Status: DISCONTINUED | OUTPATIENT
Start: 2024-10-07 | End: 2024-10-07 | Stop reason: HOSPADM

## 2024-10-07 RX ORDER — ACETAMINOPHEN 325 MG/1
650 TABLET ORAL EVERY 4 HOURS PRN
Status: DISCONTINUED | OUTPATIENT
Start: 2024-10-07 | End: 2024-10-07 | Stop reason: HOSPADM

## 2024-10-07 RX ORDER — NALOXONE HYDROCHLORIDE 0.4 MG/ML
0.2 INJECTION, SOLUTION INTRAMUSCULAR; INTRAVENOUS; SUBCUTANEOUS
Status: DISCONTINUED | OUTPATIENT
Start: 2024-10-07 | End: 2024-10-07 | Stop reason: HOSPADM

## 2024-10-07 RX ORDER — LORAZEPAM 2 MG/ML
0.5 INJECTION INTRAMUSCULAR
Status: DISCONTINUED | OUTPATIENT
Start: 2024-10-07 | End: 2024-10-07 | Stop reason: HOSPADM

## 2024-10-07 RX ORDER — OXYCODONE HYDROCHLORIDE 5 MG/1
5 TABLET ORAL EVERY 4 HOURS PRN
Status: DISCONTINUED | OUTPATIENT
Start: 2024-10-07 | End: 2024-10-07 | Stop reason: HOSPADM

## 2024-10-07 RX ORDER — ASPIRIN 325 MG
325 TABLET ORAL ONCE
Status: COMPLETED | OUTPATIENT
Start: 2024-10-07 | End: 2024-10-07

## 2024-10-07 RX ORDER — FENTANYL CITRATE 50 UG/ML
25 INJECTION, SOLUTION INTRAMUSCULAR; INTRAVENOUS
Status: DISCONTINUED | OUTPATIENT
Start: 2024-10-07 | End: 2024-10-07 | Stop reason: HOSPADM

## 2024-10-07 RX ORDER — LIDOCAINE 40 MG/G
CREAM TOPICAL
Status: DISCONTINUED | OUTPATIENT
Start: 2024-10-07 | End: 2024-10-07 | Stop reason: HOSPADM

## 2024-10-07 RX ORDER — OXYCODONE HYDROCHLORIDE 10 MG/1
10 TABLET ORAL EVERY 4 HOURS PRN
Status: DISCONTINUED | OUTPATIENT
Start: 2024-10-07 | End: 2024-10-07 | Stop reason: HOSPADM

## 2024-10-07 RX ORDER — FENTANYL CITRATE 50 UG/ML
INJECTION, SOLUTION INTRAMUSCULAR; INTRAVENOUS
Status: DISCONTINUED | OUTPATIENT
Start: 2024-10-07 | End: 2024-10-07 | Stop reason: HOSPADM

## 2024-10-07 RX ORDER — SODIUM CHLORIDE 9 MG/ML
INJECTION, SOLUTION INTRAVENOUS CONTINUOUS
Status: DISCONTINUED | OUTPATIENT
Start: 2024-10-07 | End: 2024-10-07

## 2024-10-07 RX ORDER — ATROPINE SULFATE 0.1 MG/ML
0.5 INJECTION INTRAVENOUS
Status: DISCONTINUED | OUTPATIENT
Start: 2024-10-07 | End: 2024-10-07 | Stop reason: HOSPADM

## 2024-10-07 RX ORDER — NALOXONE HYDROCHLORIDE 0.4 MG/ML
0.4 INJECTION, SOLUTION INTRAMUSCULAR; INTRAVENOUS; SUBCUTANEOUS
Status: DISCONTINUED | OUTPATIENT
Start: 2024-10-07 | End: 2024-10-07 | Stop reason: HOSPADM

## 2024-10-07 RX ORDER — FLUMAZENIL 0.1 MG/ML
0.2 INJECTION, SOLUTION INTRAVENOUS
Status: DISCONTINUED | OUTPATIENT
Start: 2024-10-07 | End: 2024-10-07 | Stop reason: HOSPADM

## 2024-10-07 RX ORDER — IOPAMIDOL 755 MG/ML
INJECTION, SOLUTION INTRAVASCULAR
Status: DISCONTINUED | OUTPATIENT
Start: 2024-10-07 | End: 2024-10-07 | Stop reason: HOSPADM

## 2024-10-07 RX ADMIN — HEPARIN SODIUM 1650 UNITS/HR: 10000 INJECTION, SOLUTION INTRAVENOUS at 06:19

## 2024-10-07 RX ADMIN — SODIUM CHLORIDE: 9 INJECTION, SOLUTION INTRAVENOUS at 11:30

## 2024-10-07 RX ADMIN — METOPROLOL SUCCINATE 25 MG: 25 TABLET, EXTENDED RELEASE ORAL at 08:33

## 2024-10-07 RX ADMIN — ASPIRIN 325 MG ORAL TABLET 325 MG: 325 PILL ORAL at 11:22

## 2024-10-07 ASSESSMENT — ACTIVITIES OF DAILY LIVING (ADL)
ADLS_ACUITY_SCORE: 20

## 2024-10-07 NOTE — PROGRESS NOTES
[facility]    Medicine Progress Note - Hospitalist Service    Date of Admission:  10/4/2024    Primary Care Physician   Albert Chapman  CONSULTANTS: cardiology     Assessment & Plan     Phillip Nassar is a 59 year old male with past medical history significant for asthma and recurrent sinusitis who presented to [facility] on 10/4/2024 with mid-sternal chest pain.     Patient stated approximately 1 week ago he started to have symptoms of an upper respiratory infection with rhinorrhea and cough.  He reports that he has a history of recurrent sinusitis and therefore that is what he thought this was.  However, after few days he reported that it was not improving and therefore took a COVID test which returned positive.  He had been recovering at home and he reports that his COVID symptoms were improving.  However this morning he woke in noticed that he had some midsternal chest pain.  He thought it might have been some heartburn or muscle strain.  He took some ibuprofen, which she reported improved the pain.  He subsequently took a nap, and woke later feeling well.  However, later this afternoon at approximately 4 PM he developed recurrent midsternal chest pain.  He reported that this pain was worse than prior and decided to present to the emergency department.  He reports that his wife was driving him to the emergency department and he does that the pain was worsening.  It was not associated with any shortness of breath, dizziness.  He has not had any exertional symptoms or stuttering chest pain for the past.     In the emergency department patient was afebrile.  His heart rate 82-93.  Blood pressure is 135-143/86-90.  He had normal oxygen saturations on room air.  Laboratory studies were largely within normal limits with the exception of troponin 710 increasing to 795.  He also had a positive COVID PCR.  CXR without acute pathology.  ECG with T wave inversions lead III and V1,  as well as slight ST depression in V1.  He was provided with ASA and started on heparin gtt., as well as a nitro patch.  He has been admitted for further cares of NSTEMI.     NSTEMI vs viral myocarditis   Presented with midsternal chest pain found to have troponin 710--> 852 in the setting of an acute COVID infection.  ECG with T wave inversions in lead III and V1, and slight ST depression in V1. Was given ASA on arrival to ER. Started on heparin gtt. and Nitropatch in the emergency department, which was continued on admission. Patient without prior cardiac history, however did have COVID recently. Continue on a apirin, started on statin and beta blocker.  HDL 36, .  Echo  with normal EF of 55-60% without any wall motion abnormalities.  Given COVID, ? If this could be myocarditis.    Follow on telemetry.  Chest pain current resolved.  Cardiology planning  angiogram 10/7/24, angiogram pending. Is npo.  May be able to leave later today depending on results.  Troponins in the 700s. Likely to  need a cardiac MRI as outpatient.         COVID-19 Infection  Tested positive for COVID-19 approximately 5 days prior to admission.  He states that he is recovering well at home until today when he started to have chest pain.  From a COVID standpoint reported that he is feeling improved.  No need for antivirals/steroids at this point. Is in isolation. Denies any symptoms    Asthma  Albuterol nebs as needed     Discussed plan of care with nursing, cardiology note reviewed, social work/case management      Diet: NPO for Medical/Clinical Reasons Except for: Meds    DVT Prophylaxis: heparin drip  Almaguer Catheter: Not present  Lines: None     Cardiac Monitoring: ACTIVE order. Indication: AMI (NSTEMI/ STEMI) (48 hours)    RESTRAINTS: not indicated  Code Status: Full Code      This document was created using voice recognition technology.  Please excuse any typographical errors that may have occurred.  Please call with any  questions.                               # Asthma: noted on problem list        Disposition Plan      Expected Discharge Date: 10/07/2024                  Barrier to discharge: cardiac work up  Medically Ready for Discharge: Anticipated Today depending on angiogram results      Josue Sna MD  Hospitalist Service  [facility]  Securely message with [employer] (more info)  Text page via [employer] Paging/Directory   ______________________________________________________________________    Interval History   Patient is doing fine.  No chest pain or sob.  Planning angiogram today. Wants to get home sooner than later.        ROS: A comprehensive review of systems was negative except for items noted in the HPI.  Patient currently denies any fever, chills, sweats, nausea, vomiting, diarrhea, shortness of breath, or chest pain.      Physical Exam   Vital Signs: Temp: 98.5  F (36.9  C) Temp src: Oral BP: 131/84 Pulse: 74   Resp: 20 SpO2: 94 % O2 Device: None (Room air)    Weight: 219 lbs 1.6 oz    Exam stable from yesterday  General appearance: Patient is alert and oriented x3, no apparent distress, lying in bed, pleasant and conversing normally, speaking in full sentences, appears stated age  HEENT:  Mucous membranes are moist  RESPIRATORY: Clear to auscultation bilateral, good air movement  Chest: no palpable chest pain  CARDIOVASCULAR: Regular rate and rhythm, normal S1/S2, no murmurs   GASTROINTESTINAL: Non-distended, non-tender, soft, bowel sounds present throughout  NEUROLOGIC:  Cranial nerves II-XII intact, without any focal deficits, strength 5/5 throughout  EXTREMITIES:  Moves all extremities, no clubbing, cyanosis, nor edema  :  Tripp not present         Data     I have personally reviewed the following data over the past 24 hrs:    8.1  \   14.6   / 240     N/A N/A N/A /  N/A   4.5 N/A N/A \     Trop: 852 () BNP: N/A          Recent Results (from the past 4320 hour(s))   Echocardiogram  Complete   Result Value    LVEF  55-60%    [facility]    [id number]  [id number]  [id number]  [id number]^ALESSANDRA^JUNIOR     [facility]  Echocardiography Laboratory  [address] [place] [address]     Name: EARLINE WOODS  MRN: [id number]  : 1965  Study Date: 10/05/2024 09:49 AM  Age: 59 yrs  Gender: Male  Patient Location: [facility]  Reason For Study: MI - Acute  Ordering Physician: JUNIOR APARICIO  Performed By: TRAMAINE Shaffer     BSA: 2.1 m2  Height: 68 in  Weight: 219 lb  HR: 76  BP: 141/96 mmHg  ______________________________________________________________________________  Procedure  Complete Portable Echo Adult. Optison (NDC #[id number]) given intravenously.  ______________________________________________________________________________  Interpretation Summary     Left ventricular systolic function is normal.  The LV ejection fraction is 55-60%.  No regional wall motion abnormalities.  Normal right ventricular size and systolic function.  No significant valve disease.     There is no comparison study available.  ______________________________________________________________________________  Left Ventricle  The left ventricle is normal in size. There is normal left ventricular wall  thickness. Left ventricular systolic function is normal. The visual ejection  fraction is 55-60%. Left ventricular diastolic function is normal. No regional  wall motion abnormalities noted.     Right Ventricle  The right ventricle is normal in size and function.     Atria  Normal left atrial size. Right atrial size is normal. There is no atrial shunt  seen.     Mitral Valve  The mitral valve leaflets appear normal. There is no evidence of stenosis,  fluttering, or prolapse. There is trace mitral regurgitation. There is no  mitral valve stenosis.     Tricuspid Valve  Normal tricuspid valve. There is trace tricuspid regurgitation. Right  ventricular systolic pressure could not be approximated due to  inadequate  tricuspid regurgitation.     Aortic Valve  The aortic valve is trileaflet with aortic valve sclerosis. There is trace  aortic regurgitation. No aortic stenosis is present.     Pulmonic Valve  There is trace pulmonic valvular regurgitation. Normal pulmonic valve  velocity.     Vessels  The aortic root is normal size. Normal size ascending aorta. The inferior vena  cava is normal.     Pericardium  There is no pericardial effusion.     Rhythm  Sinus rhythm was noted.  ______________________________________________________________________________  MMode/2D Measurements & Calculations     IVSd: 0.86 cm  LVIDd: 4.5 cm  LVIDs: 3.2 cm  LVPWd: 1.1 cm  FS: 29.4 %  LV mass(C)d: 148.0 grams  LV mass(C)dI: 69.7 grams/m2  Ao root diam: 3.8 cm  asc Aorta Diam: 3.2 cm  LVOT diam: 2.3 cm  LVOT area: 4.2 cm2  Ao root diam index Ht(cm/m): 2.2  Ao root diam index BSA (cm/m2): 1.8  Asc Ao diam index BSA (cm/m2): 1.5  Asc Ao diam index Ht(cm/m): 1.9  LA Volume (BP): 59.9 ml     LA Volume Index (BP): 28.3 ml/m2  RV Base: 3.2 cm  RWT: 0.47  TAPSE: 2.1 cm     Doppler Measurements & Calculations  MV E max max: 67.3 cm/sec  MV A max max: 64.3 cm/sec  MV E/A: 1.0  MV max P.2 mmHg  MV mean P.90 mmHg  MV V2 VTI: 18.1 cm  MV dec time: 0.20 sec  PA V2 max: 101.4 cm/sec  PA max P.1 mmHg  PA acc time: 0.12 sec  E/E' av.9  Lateral E/e': 7.9  Medial E/e': 9.8  RV S Max: 10.4 cm/sec     ______________________________________________________________________________  Report approved by: Dr Brenda Mcpherson 10/05/2024 01:03 PM                 Imaging:   Results for orders placed or performed during the hospital encounter of 10/04/24   Chest XR,  PA & LAT    Narrative    EXAM: XR CHEST 2 VIEWS  LOCATION: [facility]  DATE: 10/4/2024    INDICATION: chest pain  COMPARISON: None.      Impression    IMPRESSION: Negative chest.     Procedures: cardiac angiogram pending 10/7/24    I have personally have reviewed  the patient's most up to date radiologic exams, EKG, labs, orders, and medications myself

## 2024-10-07 NOTE — PLAN OF CARE
"A & O x 4, independent in room. Denies pain or SOB. NPO for angiogram today. Heparin going at 1650 unit/hr. Discharge TBD            Goal Outcome Evaluation:      Plan of Care Reviewed With: patient    Overall Patient Progress: no changeOverall Patient Progress: no change      Problem: Adult Inpatient Plan of Care  Goal: Plan of Care Review  Description: The Plan of Care Review/Shift note should be completed every shift.  The Outcome Evaluation is a brief statement about your assessment that the patient is improving, declining, or no change.  This information will be displayed automatically on your shift  note.  Outcome: Progressing  Flowsheets (Taken 10/7/2024 0646)  Plan of Care Reviewed With: patient  Overall Patient Progress: no change  Goal: Patient-Specific Goal (Individualized)  Description: You can add care plan individualizations to a care plan. Examples of Individualization might be:  \"Parent requests to be called daily at 9am for status\", \"I have a hard time hearing out of my right ear\", or \"Do not touch me to wake me up as it startles  me\".  Outcome: Progressing  Goal: Absence of Hospital-Acquired Illness or Injury  Outcome: Progressing  Intervention: Identify and Manage Fall Risk  Recent Flowsheet Documentation  Taken 10/7/2024 0032 by Farrah Damon RN  Safety Promotion/Fall Prevention: safety round/check completed  Intervention: Prevent Skin Injury  Recent Flowsheet Documentation  Taken 10/7/2024 0032 by Farrah Damon RN  Body Position: position changed independently  Intervention: Prevent and Manage VTE (Venous Thromboembolism) Risk  Recent Flowsheet Documentation  Taken 10/7/2024 0032 by Farrah Damon RN  VTE Prevention/Management: SCDs off (sequential compression devices)  Intervention: Prevent Infection  Recent Flowsheet Documentation  Taken 10/7/2024 0032 by Farrah Damon RN  Infection Prevention: personal protective equipment utilized  Goal: Optimal Comfort and Wellbeing  Outcome: " Progressing  Goal: Readiness for Transition of Care  Outcome: Progressing

## 2024-10-07 NOTE — PROGRESS NOTES
Coronary angiogram performed without complication.  Pt tolerated procedure well.   Manual pressure applied at right femoral access site until hemostasis achieved.  Report given to receiving nurse; access site assessed together at bedside.

## 2024-10-07 NOTE — PLAN OF CARE
"AO x4. Denies pain. On K, Mg protocols, rechecks in AM. On heparin drip running 1650 unit(s)/hr, hep xa in AM. Tele: SR. Independent in room. Plan: NPO midnight for angio tomorrow, cardiology following            Goal Outcome Evaluation:      Plan of Care Reviewed With: patient    Overall Patient Progress: no changeOverall Patient Progress: no change    Outcome Evaluation: Denies pain. Hep drip running 1650u/hr. NPO midnight for angio tomorrow      Problem: Adult Inpatient Plan of Care  Goal: Plan of Care Review  Description: The Plan of Care Review/Shift note should be completed every shift.  The Outcome Evaluation is a brief statement about your assessment that the patient is improving, declining, or no change.  This information will be displayed automatically on your shift  note.  Outcome: Progressing  Flowsheets (Taken 10/6/2024 2200)  Outcome Evaluation: Denies pain. Hep drip running 1650u/hr. NPO midnight for angio tomorrow  Plan of Care Reviewed With: patient  Overall Patient Progress: no change  Goal: Patient-Specific Goal (Individualized)  Description: You can add care plan individualizations to a care plan. Examples of Individualization might be:  \"Parent requests to be called daily at 9am for status\", \"I have a hard time hearing out of my right ear\", or \"Do not touch me to wake me up as it startles  me\".  Outcome: Progressing  Goal: Absence of Hospital-Acquired Illness or Injury  Outcome: Progressing  Intervention: Identify and Manage Fall Risk  Recent Flowsheet Documentation  Taken 10/6/2024 1952 by Salima Gallagher RN  Safety Promotion/Fall Prevention:   safety round/check completed   room organization consistent   nonskid shoes/slippers when out of bed   lighting adjusted  Intervention: Prevent Skin Injury  Recent Flowsheet Documentation  Taken 10/6/2024 1952 by Salima Gallagher RN  Body Position: position changed independently  Goal: Optimal Comfort and Wellbeing  Outcome: Progressing  Goal: Readiness for " Transition of Care  Outcome: Progressing     Problem: Fall Injury Risk  Goal: Absence of Fall and Fall-Related Injury  Outcome: Progressing  Intervention: Identify and Manage Contributors  Recent Flowsheet Documentation  Taken 10/6/2024 1952 by Salima Gallagher RN  Medication Review/Management: medications reviewed  Intervention: Promote Injury-Free Environment  Recent Flowsheet Documentation  Taken 10/6/2024 1952 by Salima Gallagher, RN  Safety Promotion/Fall Prevention:   safety round/check completed   room organization consistent   nonskid shoes/slippers when out of bed   lighting adjusted     Problem: Pain Acute  Goal: Optimal Pain Control and Function  Outcome: Progressing  Intervention: Prevent or Manage Pain  Recent Flowsheet Documentation  Taken 10/6/2024 1952 by Salima Gallagher, RN  Medication Review/Management: medications reviewed

## 2024-10-07 NOTE — PROGRESS NOTES
VSS, denies pain, tolerating regular diet. RLE angio site WNL. AVS reviewed and given to pt. Pt belongings sent with. Pt OOB to home 18:27

## 2024-10-07 NOTE — DISCHARGE SUMMARY
[facility]  Hospitalist Discharge Summary      Date of Admission:  10/4/2024  Date of Discharge:  10/7/2024  Discharging Provider: Josue San MD  Discharge Service: Hospitalist Service  Primary Care Physician   Albert Chapman    Discharge Diagnoses   Viral myocarditis  COVID-19 infection  Asthma     Hospital Course   Phillip Nassar is a 59 year old male with past medical history significant for asthma and recurrent sinusitis who presented to [facility] on 10/4/2024 with mid-sternal chest pain.     Patient stated approximately 1 week ago he started to have symptoms of an upper respiratory infection with rhinorrhea and cough.  He reports that he has a history of recurrent sinusitis and therefore that is what he thought this was.  However, after few days he reported that it was not improving and therefore took a COVID test which returned positive.  He had been recovering at home and he reports that his COVID symptoms were improving.  However this morning he woke in noticed that he had some midsternal chest pain.  He thought it might have been some heartburn or muscle strain.  He took some ibuprofen, which she reported improved the pain.  He subsequently took a nap, and woke later feeling well.  However, later this afternoon at approximately 4 PM he developed recurrent midsternal chest pain.  He reported that this pain was worse than prior and decided to present to the emergency department.  He reports that his wife was driving him to the emergency department and he does that the pain was worsening.  It was not associated with any shortness of breath, dizziness.  He has not had any exertional symptoms or stuttering chest pain for the past.     In the emergency department patient was afebrile.  His heart rate 82-93.  Blood pressure is 135-143/86-90.  He had normal oxygen saturations on room air.  Laboratory studies were largely within normal limits with the exception of  troponin 710 increasing to 795.  He also had a positive COVID PCR.  CXR without acute pathology.  ECG with T wave inversions lead III and V1, as well as slight ST depression in V1.  He was provided with ASA and started on heparin gtt., as well as a nitro patch.  He has been admitted for further cares of NSTEMI.     Viral myocarditis   Patient presented with midsternal chest pain found to have troponin 710--> 852 in the setting of an acute COVID infection.  ECG with T wave inversions in lead III and V1, and slight ST depression in V1. Was given ASA on arrival to ER. Started on heparin gtt. and Nitropatch in the emergency department, which was continued on admission. Patient without prior cardiac history, however did have COVID recently. There was concern for a NSTEMI.  He was placed on an apirin, started on statin and beta blocker.  HDL 36, .  Echo  with normal EF of 55-60% without any wall motion abnormalities.  Given COVID, likely represented a myocarditis.    Followed on telemetry.  Chest pain current resolved.  Cardiology took for an  angiogram 10/7/2 which did not show any significant coronary artery disease.  Patient to get out patient cardiac MRI and follow up with cardiology as an outpatient.           COVID-19 Infection  Tested positive for COVID-19 approximately 5 days prior to admission.  He states that he is recovering well at home until today when he started to have chest pain.  From a COVID standpoint reported that he is feeling improved.  No need for antivirals/steroids at this point. Is in isolation. Denies any symptoms     Asthma  Albuterol nebs as needed    Clinically Significant Risk Factors          Significant Results and Procedures   Most Recent 3 CBC's:  Recent Labs   Lab Test 10/07/24  0648 10/05/24  0652 10/04/24  1917   WBC 8.1 5.8 8.4   HGB 14.6 14.2 15.2   MCV 86 86 87    178 173     Most Recent 3 BMP's:  Recent Labs   Lab Test 10/07/24  0648 10/06/24  0700 10/05/24  0652  10/04/24  1917     --  138 135   POTASSIUM 4.6  4.5 4.0 3.9 4.1   CHLORIDE 100  --  102 98   CO2 18*  --  21* 22   BUN 13.2  --  12.0 13.9   CR 0.92  --  0.83 0.96   ANIONGAP 18*  --  15 15   VINICIUS 9.3  --  8.5* 8.6*   GLC 91  --  98 103*   ,   Results for orders placed or performed during the hospital encounter of 10/04/24   Chest XR,  PA & LAT    Narrative    EXAM: XR CHEST 2 VIEWS  LOCATION: [facility]  DATE: 10/4/2024    INDICATION: chest pain  COMPARISON: None.      Impression    IMPRESSION: Negative chest.   Echocardiogram Complete     Value    LVEF  55-60%    Narrative    [id number]  [id number]  [id number]^ALESSANDRA^JUNIOR     [facility]  Echocardiography Laboratory  [address] [place] [address]     Name: EARLINE WOODS  MRN: [id number]  : 1965  Study Date: 10/05/2024 09:49 AM  Age: 59 yrs  Gender: Male  Patient Location: [facility]  Reason For Study: MI - Acute  Ordering Physician: JUNIOR APARICIO  Performed By: TRAMAINE Shaffer     BSA: 2.1 m2  Height: 68 in  Weight: 219 lb  HR: 76  BP: 141/96 mmHg  ______________________________________________________________________________  Procedure  Complete Portable Echo Adult. Optison (NDC #[id number]) given intravenously.  ______________________________________________________________________________  Interpretation Summary     Left ventricular systolic function is normal.  The LV ejection fraction is 55-60%.  No regional wall motion abnormalities.  Normal right ventricular size and systolic function.  No significant valve disease.     There is no comparison study available.  ______________________________________________________________________________  Left Ventricle  The left ventricle is normal in size. There is normal left ventricular wall  thickness. Left ventricular systolic function is normal. The visual ejection  fraction is 55-60%. Left ventricular diastolic function is normal. No regional  wall motion  abnormalities noted.     Right Ventricle  The right ventricle is normal in size and function.     Atria  Normal left atrial size. Right atrial size is normal. There is no atrial shunt  seen.     Mitral Valve  The mitral valve leaflets appear normal. There is no evidence of stenosis,  fluttering, or prolapse. There is trace mitral regurgitation. There is no  mitral valve stenosis.     Tricuspid Valve  Normal tricuspid valve. There is trace tricuspid regurgitation. Right  ventricular systolic pressure could not be approximated due to inadequate  tricuspid regurgitation.     Aortic Valve  The aortic valve is trileaflet with aortic valve sclerosis. There is trace  aortic regurgitation. No aortic stenosis is present.     Pulmonic Valve  There is trace pulmonic valvular regurgitation. Normal pulmonic valve  velocity.     Vessels  The aortic root is normal size. Normal size ascending aorta. The inferior vena  cava is normal.     Pericardium  There is no pericardial effusion.     Rhythm  Sinus rhythm was noted.  ______________________________________________________________________________  MMode/2D Measurements & Calculations     IVSd: 0.86 cm  LVIDd: 4.5 cm  LVIDs: 3.2 cm  LVPWd: 1.1 cm  FS: 29.4 %  LV mass(C)d: 148.0 grams  LV mass(C)dI: 69.7 grams/m2  Ao root diam: 3.8 cm  asc Aorta Diam: 3.2 cm  LVOT diam: 2.3 cm  LVOT area: 4.2 cm2  Ao root diam index Ht(cm/m): 2.2  Ao root diam index BSA (cm/m2): 1.8  Asc Ao diam index BSA (cm/m2): 1.5  Asc Ao diam index Ht(cm/m): 1.9  LA Volume (BP): 59.9 ml     LA Volume Index (BP): 28.3 ml/m2  RV Base: 3.2 cm  RWT: 0.47  TAPSE: 2.1 cm     Doppler Measurements & Calculations  MV E max max: 67.3 cm/sec  MV A max max: 64.3 cm/sec  MV E/A: 1.0  MV max P.2 mmHg  MV mean P.90 mmHg  MV V2 VTI: 18.1 cm  MV dec time: 0.20 sec  PA V2 max: 101.4 cm/sec  PA max P.1 mmHg  PA acc time: 0.12 sec  E/E' av.9  Lateral E/e': 7.9  Medial E/e': 9.8  RV S Max: 10.4 cm/sec    "  ______________________________________________________________________________  Report approved by: Dr Brenda Mcpherson 10/05/2024 01:03 PM         Cardiac Catheterization     Value    Cath EF Estimated 60    Narrative      Mid RCA lesion is 10% stenosed.    Mid LAD lesion is 5% stenosed.    The ejection fraction is greater than 55% by visual estimate.    Trivial CAD - LAD and RCA. After IC NTG to LCA the mid and distal LAD   enlarged--this represents some element of vasospasm (diffuse, non focal)  Hyperdynamic LV noted and the LV/Ao pressure tracings have borderline   changes of spike/dome-- this may represent very mild HOCM (after the IC   NTG given).   Overall normal LV wall motion and noted a few \"deep\"   trabeculae along inferior wall.       Cardiac angiogram 10/7/24    Mid RCA lesion is 10% stenosed.    Mid LAD lesion is 5% stenosed.    The ejection fraction is greater than 55% by visual estimate.    Trivial CAD - LAD and RCA. After IC NTG to LCA the mid and distal LAD enlarged--this represents some element of vasospasm (diffuse, non focal)  Hyperdynamic LV noted and the LV/Ao pressure tracings have borderline changes of spike/dome-- this may represent very mild HOCM (after the IC NTG given).   Overall normal LV wall motion and noted a few \"deep\" trabeculae along inferior wall.       Follow up/instructions: patient to get an outpatient cardiac MRI and follow up with cardiology    Pending test results at discharge:      Unresulted Labs Ordered in the Past 30 Days of this Admission       No orders found from 9/4/2024 to 10/5/2024.            Discharge Orders      Primary Care - Care Coordination Referral      Follow-Up with Cardiology [facility]      Reason for your hospital stay    Myocarditis related to COVID     Follow-up and recommended labs and tests     Follow up with cardiology as recommended     Activity    Your activity upon discharge: activity take it easy until you are fully recovered from COVID     Diet    " Follow this diet upon discharge: Current Diet:Orders Placed This Encounter  Regular diet     MRI Cardiac w/contrast       Discharge Disposition   Discharged to home  Condition at discharge: Stable      Consultations This Hospital Stay   PHARMACY IP CONSULT  CARDIOLOGY IP CONSULT  SMOKING CESSATION PROGRAM IP CONSULT  PHARMACY IP CONSULT    Code Status   Full Code    Time Spent on this Encounter   I, Josue San MD, personally saw the patient today and spent less than or equal to 30 minutes discharging this patient. Discussed with Dr Ferraro of cardiology        This document was created using voice recognition technology.  Please excuse any typographical errors that may have occurred.  Please call with any questions.       Josue San MD  [facility]  [address]  [facility] [address]  Phone: [phone]  Fax: [phone]  ______________________________________________________________________         Discharge Medications   Current Discharge Medication List        CONTINUE these medications which have NOT CHANGED    Details   albuterol (PROAIR HFA/PROVENTIL HFA/VENTOLIN HFA) 108 (90 Base) MCG/ACT inhaler Inhale 2-4 puffs into the lungs every 4 hours as needed for shortness of breath or wheezing  Qty: 18 g, Refills: 3    Comments: Pharmacy may dispense brand covered by insurance (Proair, or proventil or ventolin or generic albuterol inhaler)  Associated Diagnoses: Infection due to 2019 novel coronavirus      guaiFENesin-codeine (ROBITUSSIN AC) 100-10 MG/5ML solution Take 5 mLs by mouth every 4 hours as needed for cough.  Qty: 60 mL, Refills: 0    Associated Diagnoses: Cough, unspecified type      pseudoePHEDrine (SUDAFED) 120 MG 12 hr tablet Take 120 mg by mouth every 12 hours as needed for congestion.      sildenafil (VIAGRA) 100 MG tablet take 1/2 to one tab daily as needed for erectile dysfunction. take 30 min to 4 hours before sexual activity  Qty: 4  tablet, Refills: 11    Associated Diagnoses: Erectile dysfunction, unspecified erectile dysfunction type           STOP taking these medications       amoxicillin-clavulanate (AUGMENTIN) 875-125 MG tablet Comments:   Reason for Stopping:             Allergies   Allergies   Allergen Reactions    No Known Drug Allergy

## 2024-10-07 NOTE — PROGRESS NOTES
[facility]    Cardiology Progress Note    ASSESSMENT:  59-year-old male seen for non-STEMI.  He may have COVID myocarditis, but true  non-STEMI is a possibility as well.  Plan is for coronary angiogram today.  His echo showed preserved EF with normal wall motion.  He is symptom-free.    RECOMMENDATIONS:  1.  COVID myocarditis versus non-STEMI  - Coronary angiogram today    Fidencio Ferraro MD  Cardiology [facility]  Pager:  [phone]  Text Page    Addendum:  Coronary angiogram today shows only mild CAD.  Suspect his troponin elevation is secondary to COVID, probably low-grade myocarditis or pericarditis.  Since his symptoms have resolved, would not recommend additional treatment at this time.  If he had recurrent chest pain, would then recommend pericarditis treatment such as colchicine with a course of ibuprofen.    Fidencio Ferraro MD  Cardiology [facility]  Pager: [phone]  Text Page  October 7, 2024      SUBJECTIVE: Feels good this morning.  No chest pain.    MEDICATIONS:  Current Facility-Administered Medications   Medication Dose Route Frequency Provider Last Rate Last Admin    albuterol (PROVENTIL) neb solution 2.5 mg  2.5 mg Nebulization Q6H PRN Josue San MD        aspirin (ASA) chewable tablet 81 mg  81 mg Oral QAM Brenda Hartley MD        atorvastatin (LIPITOR) tablet 40 mg  40 mg Oral QPM Josue San MD   40 mg at 10/06/24 1952    calcium carbonate (TUMS) chewable tablet 1,000 mg  1,000 mg Oral 4x Daily PRN Jose Pate MD        heparin 25,000 units in 0.45% NaCl 250 mL ANTICOAGULANT infusion  0-5,000 Units/hr Intravenous Continuous Sabina Edmonds MD 18 mL/hr at 10/07/24 0829 1,800 Units/hr at 10/07/24 0829    hydrALAZINE (APRESOLINE) tablet 10 mg  10 mg Oral Q4H PRN Jose Pate MD        Or    hydrALAZINE (APRESOLINE) injection 10 mg  10 mg Intravenous Q4H PRN Jose Pate MD        lidocaine (LMX4) cream   Topical Q1H PRN Rio  MD Jose        lidocaine 1 % 0.1-1 mL  0.1-1 mL Other Q1H PRN Jose Pate MD        metoprolol succinate ER (TOPROL XL) 24 hr tablet 25 mg  25 mg Oral Daily Josue San MD   25 mg at 10/07/24 0833    ondansetron (ZOFRAN ODT) ODT tab 4 mg  4 mg Oral Q6H PRN Jose Pate MD        Or    ondansetron (ZOFRAN) injection 4 mg  4 mg Intravenous Q6H PRN Jose Pate MD        Patient is already receiving anticoagulation with heparin, enoxaparin (LOVENOX), warfarin (COUMADIN)  or other anticoagulant medication   Does not apply Continuous PRN Jose Pate MD        senna-docusate (SENOKOT-S/PERICOLACE) 8.6-50 MG per tablet 1 tablet  1 tablet Oral BID PRN Jose Pate MD        Or    senna-docusate (SENOKOT-S/PERICOLACE) 8.6-50 MG per tablet 2 tablet  2 tablet Oral BID PRN Jose Pate MD        sodium chloride (PF) 0.9% PF flush 3 mL  3 mL Intracatheter Q8H Jose Pate MD   3 mL at 10/07/24 0832    sodium chloride (PF) 0.9% PF flush 3 mL  3 mL Intracatheter q1 min prn Jose Pate MD           ALLERGIES:  Allergies   Allergen Reactions    No Known Drug Allergy        REVIEW OF SYSTEMS:  General: no fatigue, weight loss  Cardiac: per HPI  Respiratory: per HPI  GI: no nausea, emesis, melena  Musculoskeletal: no weakness  Neurologic: no aphasia, paraesthesias  Neuropsychiatric: no depression    PHYSICAL EXAM:  Temp: 98.5  F (36.9  C) Temp src: Oral BP: 131/84 Pulse: 74   Resp: 20 SpO2: 94 % O2 Device: None (Room air)    Vital Signs with Ranges  Temp:  [98.2  F (36.8  C)-98.5  F (36.9  C)] 98.5  F (36.9  C)  Pulse:  [71-81] 74  Resp:  [20-22] 20  BP: (114-135)/(71-90) 131/84  SpO2:  [93 %-97 %] 94 %  219 lbs 1.6 oz    Constitutional: awake, alert, Ox3  Eyes: PERRL, sclera nonicteric  ENT: trachea midline  Respiratory: lungs clear  Cardiovascular: Regular rate and rhythm, no murmurs   GI: nondistended, nontender, bowel sounds present  Lymph/Hematologic: no lymphadenopathy  Skin: dry, no rash  Musculoskeletal: good muscle tone, strength  5/5 in upper and lower extremities  Neurologic: no focal deficits  Neuropsychiatric: appropriate affact    Intake/Output Summary (Last 24 hours) at 10/7/2024 0930  Last data filed at 10/6/2024 1822  Gross per 24 hour   Intake 1690 ml   Output --   Net 1690 ml     DATA:  Labs: Potassium 4.5, creatinine 1.9, creatinine 0.8, hemoglobin 14, peak troponin 852    EKG: October 4: Sinus rhythm, rate 88, no ST elevation or depression    Tele: Sinus rhythm, rate 70s, no arrhythmias    Echo: October 5: EF 60%, normal wall motion, no valve disease

## 2024-10-07 NOTE — PLAN OF CARE
"Goal Outcome Evaluation:      Plan of Care Reviewed With: patient    Overall Patient Progress: no changeOverall Patient Progress: no change    Outcome Evaluation: A & O x 4, Ind in room. Denies chest pain anr SOB. NPO for angiogram today. Heparin going at 1800 unit/hr. Coronary angio today w/o complication.  Discharge TBD.    Discharge today okay per hospitalist and cardiology.     Problem: Adult Inpatient Plan of Care  Goal: Plan of Care Review  Description: The Plan of Care Review/Shift note should be completed every shift.  The Outcome Evaluation is a brief statement about your assessment that the patient is improving, declining, or no change.  This information will be displayed automatically on your shift  note.  10/7/2024 1345 by Soledad Stanley RN  Outcome: Progressing  Flowsheets (Taken 10/7/2024 1345)  Outcome Evaluation: A & O x 4, Ind in room. Denies chest pain anr SOB. NPO for angiogram today. Heparin going at 1800 unit/hr. Coronary angio today w/o complication.  Discharge TBD.  Plan of Care Reviewed With: patient  Overall Patient Progress: no change  10/7/2024 1333 by Soledad Stanley RN  Outcome: Progressing  Flowsheets (Taken 10/7/2024 1328)  Plan of Care Reviewed With: patient  Overall Patient Progress: no change  10/7/2024 1327 by Soledad Stanley RN  Outcome: Progressing  Goal: Patient-Specific Goal (Individualized)  Description: You can add care plan individualizations to a care plan. Examples of Individualization might be:  \"Parent requests to be called daily at 9am for status\", \"I have a hard time hearing out of my right ear\", or \"Do not touch me to wake me up as it startles  me\".  10/7/2024 1345 by Soledad Stanley RN  Outcome: Progressing  10/7/2024 1333 by Soledad Stanley RN  Outcome: Progressing  10/7/2024 1327 by Soledad Stanley RN  Outcome: Progressing  Goal: Absence of Hospital-Acquired Illness or Injury  10/7/2024 1345 by Soledad Stanley RN  Outcome: Progressing  10/7/2024 1333 by Soledad Stanley, " RN  Outcome: Progressing  10/7/2024 1327 by Soledad Stanley RN  Outcome: Progressing  Intervention: Identify and Manage Fall Risk  Recent Flowsheet Documentation  Taken 10/7/2024 1300 by Soledad Stanley RN  Safety Promotion/Fall Prevention:   nonskid shoes/slippers when out of bed   increase visualization of patient   lighting adjusted   clutter free environment maintained   patient and family education   safety round/check completed  Taken 10/7/2024 0840 by Soledad Stanley RN  Safety Promotion/Fall Prevention: safety round/check completed  Intervention: Prevent Skin Injury  Recent Flowsheet Documentation  Taken 10/7/2024 0840 by Soledad Stanley RN  Body Position: position changed independently  Intervention: Prevent and Manage VTE (Venous Thromboembolism) Risk  Recent Flowsheet Documentation  Taken 10/7/2024 0840 by Soledad Stanley RN  VTE Prevention/Management: SCDs off (sequential compression devices)  Intervention: Prevent Infection  Recent Flowsheet Documentation  Taken 10/7/2024 0840 by Soledad Stanley RN  Infection Prevention: personal protective equipment utilized  Goal: Optimal Comfort and Wellbeing  10/7/2024 1345 by Soledad Stanley RN  Outcome: Progressing  10/7/2024 1333 by Soledad Stanley RN  Outcome: Progressing  10/7/2024 1327 by Soledad Stanley RN  Outcome: Progressing  Goal: Readiness for Transition of Care  10/7/2024 1345 by Soledad Stanley RN  Outcome: Progressing  10/7/2024 1333 by Soledad Stanley RN  Outcome: Progressing  10/7/2024 1327 by Soledad Stanley RN  Outcome: Progressing     Problem: Fall Injury Risk  Goal: Absence of Fall and Fall-Related Injury  10/7/2024 1345 by Soledad Stanley RN  Outcome: Progressing  10/7/2024 1333 by Soledad Stanley RN  Outcome: Progressing  10/7/2024 1327 by Soledad Stanley RN  Outcome: Progressing  Intervention: Identify and Manage Contributors  Recent Flowsheet Documentation  Taken 10/7/2024 0840 by Soledad Stanley RN  Medication Review/Management:   dosing adjusted   medications reviewed    high-risk medications identified  Intervention: Promote Injury-Free Environment  Recent Flowsheet Documentation  Taken 10/7/2024 1300 by Soledad Stanley RN  Safety Promotion/Fall Prevention:   nonskid shoes/slippers when out of bed   increase visualization of patient   lighting adjusted   clutter free environment maintained   patient and family education   safety round/check completed  Taken 10/7/2024 0840 by Soledad Stanley RN  Safety Promotion/Fall Prevention: safety round/check completed     Problem: Pain Acute  Goal: Optimal Pain Control and Function  10/7/2024 1345 by Soledad Stanley RN  Outcome: Progressing  10/7/2024 1333 by Soledad Stanley RN  Outcome: Progressing  10/7/2024 1327 by Soledad Stanley RN  Outcome: Progressing  Intervention: Prevent or Manage Pain  Recent Flowsheet Documentation  Taken 10/7/2024 0840 by Soledad Stanley RN  Medication Review/Management:   dosing adjusted   medications reviewed   high-risk medications identified     Problem: Gas Exchange Impaired  Goal: Optimal Gas Exchange  10/7/2024 1345 by Soledad Stanley RN  Outcome: Progressing  10/7/2024 1333 by Soledad Stanley RN  Outcome: Progressing  10/7/2024 1327 by Soledad Stanley RN  Outcome: Progressing     Problem: Cardiac Catheterization (Diagnostic/Interventional)  Goal: Absence of Bleeding  10/7/2024 1345 by Soledad Stanley RN  Outcome: Progressing  10/7/2024 1333 by Soledad Stanley RN  Outcome: Progressing  10/7/2024 1327 by Soledad Stanley RN  Outcome: Progressing  Goal: Absence of Contrast-Induced Injury  10/7/2024 1345 by Soledad Stanley RN  Outcome: Progressing  10/7/2024 1333 by Soledad Stanley RN  Outcome: Progressing  10/7/2024 1327 by Soledad Stanley RN  Outcome: Progressing  Goal: Stable Heart Rate and Rhythm  10/7/2024 1345 by Soledad Stanley RN  Outcome: Progressing  10/7/2024 1333 by Soledad Stanley RN  Outcome: Progressing  10/7/2024 1327 by Soledad Stanley RN  Outcome: Progressing  Goal: Absence of Embolism Signs and Symptoms  10/7/2024 1345 by  Lizeth, Soledad, RN  Outcome: Progressing  10/7/2024 1333 by Soledad Stanley RN  Outcome: Progressing  10/7/2024 1327 by Soledad Stanley RN  Outcome: Progressing  Intervention: Prevent or Manage Embolism  Recent Flowsheet Documentation  Taken 10/7/2024 0840 by Soledad Stanley RN  VTE Prevention/Management: SCDs off (sequential compression devices)  Goal: Anesthesia/Sedation Recovery  10/7/2024 1345 by Soledad Stanley RN  Outcome: Progressing  10/7/2024 1333 by Soledad Stanley RN  Outcome: Progressing  10/7/2024 1327 by Soledad Stanley RN  Outcome: Progressing  Intervention: Optimize Anesthesia Recovery  Recent Flowsheet Documentation  Taken 10/7/2024 1300 by Soledad Stanley RN  Safety Promotion/Fall Prevention:   nonskid shoes/slippers when out of bed   increase visualization of patient   lighting adjusted   clutter free environment maintained   patient and family education   safety round/check completed  Taken 10/7/2024 0840 by Soledad Stanley RN  Safety Promotion/Fall Prevention: safety round/check completed  Goal: Optimal Pain Control and Function  10/7/2024 1345 by Soledad Stanley RN  Outcome: Progressing  10/7/2024 1333 by Soledad Stanley RN  Outcome: Progressing  10/7/2024 1327 by Soledad Stanley RN  Outcome: Progressing  Goal: Absence of Vascular Access Complication  10/7/2024 1345 by Soledad Stanley RN  Outcome: Progressing  10/7/2024 1333 by Soledad Stanley RN  Outcome: Progressing  10/7/2024 1327 by Soledad Stanley RN  Outcome: Progressing  Intervention: Prevent and Manage Access Complications  Recent Flowsheet Documentation  Taken 10/7/2024 0840 by Soledad Stanley RN  Activity Management: up ad antony

## 2024-10-07 NOTE — PRE-PROCEDURE
GENERAL PRE-PROCEDURE:   Procedure:  Heart catheterization possible intervention  Date/Time:  10/7/2024 12:42 PM    Written consent obtained?: Yes    Risks and benefits: Risks, benefits and alternatives were discussed    Consent given by:  Patient  Patient states understanding of procedure being performed: Yes    Patient's understanding of procedure matches consent: Yes    Procedure consent matches procedure scheduled: Yes    Expected level of sedation:  Moderate  Appropriately NPO:  Yes  Lungs:  Lungs clear with good breath sounds bilaterally  Heart:  Normal heart sounds and rate  History & Physical reviewed:  History and physical reviewed and no updates needed  Statement of review:  I have reviewed the lab findings, diagnostic data, medications, and the plan for sedation  Risk benefit indication for cath poss interv discussed with pt  Discussed cad vs myocarditis and poss need for dapt is stent  Discussed risk mi death cva vasc or renal damage, emergent surgery  All questions answered and he wishes to proceed

## 2024-10-08 ENCOUNTER — TELEPHONE (OUTPATIENT)
Dept: CARDIOLOGY | Facility: CLINIC | Age: 59
End: 2024-10-08
Payer: COMMERCIAL

## 2024-10-08 ENCOUNTER — PATIENT OUTREACH (OUTPATIENT)
Dept: PEDIATRICS | Facility: CLINIC | Age: 59
End: 2024-10-08
Payer: COMMERCIAL

## 2024-10-08 ENCOUNTER — PATIENT OUTREACH (OUTPATIENT)
Dept: CARE COORDINATION | Facility: CLINIC | Age: 59
End: 2024-10-08
Payer: COMMERCIAL

## 2024-10-08 NOTE — LETTER
[facility] CARE COORDINATION  [address] [place] [address]    October 8, 2024    Phillip Nassar  [address]  [place] [address]      Dear Phillip,    I am a clinic care coordinator who works with Albert Chapman MD with the [facility]. I wanted to introduce myself and provide you with my contact information for you to be able to call me with any questions or concerns. I wanted to thank you for spending the time to talk with me.  Below is a description of clinic care coordination and how I can further assist you.       The clinic care coordination team is made up of a registered nurse, , financial resource worker and community health worker who understand the health care system. The goal of clinic care coordination is to help you manage your health and improve access to the health care system. Our team works alongside your provider to assist you in determining your health and social needs. We can help you obtain health care and community resources, providing you with necessary information and education. We can work with you through any barriers and develop a care plan that helps coordinate and strengthen the communication between you and your care team.  Our services are voluntary and are offered without charge to you personally.    Please feel free to contact me with any questions or concerns regarding care coordination and what we can offer.      We are focused on providing you with the highest-quality healthcare experience possible.    Sincerely,     Heide Panchal RN, BSN, CPHN, [facility] Ambulatory Care Management  [facility], and [facility]  (On behalf of Ashleigh Olvera RN CC)  Ashleigh Olvera RN Care Coordination  [facility]: [place], [place], [place]    Email: [email or web address]  Phone: [phone]    Enclosed: Additional information on Care Coordination.     WHAT IS CARE COORDINATION?       [facility] Care Coordination supports patients and families dealing with chronic or complex health conditions, developmental issues, and social service needs. This service is available to patients of all ages, from babies to seniors. When you re facing a difficult decision about caring for yourself or someone you love, we can help you understand your options. We identify and refer you to community resources that help with financial, legal, mental health, transportation, and other issues. We also help with your medical and related education needs.     IS CARE COORDINATION RIGHT FOR ME? Discuss a referral to Care Coordination with your primary care provider or care team member.     HOW CAN I CONNECT WITH CARE COORDINATION?  Contact your clinic.  Speak with your doctor or clinic staff.  Discuss care coordination with hospital staff before discharge.     MEET YOUR CARE COORDINATION TEAM     Registered Nurse Care Coordinator  Provides education on medications, disease management, and new diagnoses.  Addresses concerns about medical conditions and connects you to resources.  Develops patient-centered goals and communicates with patient s care team.     Social Work Care Coordinator  Provides education on emotional wellbeing.  Connects you to a variety of community-based resources.  Communicates with care team and community partners.     Community Health Worker  Identifies health and social barriers and connects you with community resources.  Develops nonclinical patient and family centered goals.  Enhances communication between patients and care teams.     Financial Resource Worker  Assists patients with applying for health insurance ([place], [employer], [place]).  Helps patients with applying for county benefits.  Connects patients with [facility].

## 2024-10-08 NOTE — TELEPHONE ENCOUNTER
"Patient was admitted to [facility] on 10/4/24 with mid-sternal chest pain for NSTEMI. Recent COVID diagnosis. Ultimately dx'd with viral myocarditis.     PMH: asthma and recurrent sinusitis.    10/5/24: Echo showed EF of 55-60% without any wall motion abnormalities.     10/7/24: Coronary angiogram via RFA showed:      Mid RCA lesion is 10% stenosed.    Mid LAD lesion is 5% stenosed.    The ejection fraction is greater than 55% by visual estimate.     Trivial CAD - LAD and RCA. After IC NTG to LCA the mid and distal LAD enlarged--this represents some element of vasospasm (diffuse, non focal).  Hyperdynamic LV noted and the LV/Ao pressure tracings have borderline changes of spike/dome-- this may represent very mild HOCM (after the IC NTG given). Overall normal LV wall motion and noted a few \"deep\" trabeculae along inferior wall.     No new cardiac meds prescribed.    Pt is scheduled for a cMRI on 10/25/24 at 0815, and an OV on 11/4/24 at 1510, both at our [place] location.    Writer attempted to call pt for a cardiology post discharge phone call, but no answer. VM left to call back with any non emergent cardiac related questions. Reminder left that the RFA access site should be healing without signs of infection, swelling or bleeding. Reminder left of appt's as scheduled above. Writer's phone number was provided. JANUSZ Gibson RN.     "

## 2024-10-08 NOTE — TELEPHONE ENCOUNTER
Dr. Chapman-    Please advise if hospital follow-up with primary care recommended prior to follow-up with cardiology.     RN took call from patient to discuss hospital discharge. Patient has already completed discharge assessment with CC. Patient reports symptoms improving. Still experiencing sinus symptoms.    Patient requests recommendation from PCP on if hospital follow-up is needed or okay to follow-up with cardiology as recommended.  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       Future Appointments 10/8/2024 - 4/6/2025        Date Visit Type Length Department Provider     10/25/2024  8:45 AM MR MYOCARDIUM W 75 min SH CV MRI [id number]    Location Instructions:     [facility] CV MRI Clinic is located at [address] [place] [address] [address] [place] [id number]. You can park your vehicle at the parking ramp west of [address] across the street from [facility]. You will cross the skyway to access our clinic on 3rd floor. Please call the clinic if you have questions regarding directions at [phone].  This appointment is in a hospital-based location.&nbsp; Before your visit, you may want to check with your insurance company for coverage and referral options, including cost differences between services provided in different clinic  settings.&nbsp; For more information visit this link on the [facility] Website:&nbsp; [email or web address]              11/14/2024  3:20 PM RETURN CARDIOLOGY 40 min [facility] Yeimy Armendariz NP    Location Instructions:     Our clinic is located at [address], [place] [address]. You can park your vehicle at the parking ramp west of [address] across the street from [facility]. You will cross the skyway to access our clinic on the 2nd floor.              1/29/2025 11:00 AM PREVENTATIVE ADULT 30 min EA IM/GEMMAS Albert Chapman MD    Location Instructions:     [facility] is located at [address], just west of the [address] exit off of [address]. Free parking is available; from either [address] or [address], access the lot by turning onto [address].               1/29/2025  3:30 PM MYC PREVENTATIVE ADULT VISIT 30 min EA IM/PEDS Albert Chapman MD    Location Instructions:     [facility] is located at [address], just west of the [address] exit off of [address]. Free parking is available; from either [address] or [address], access the lot by turning onto [address].               3/24/2025  4:00 PM RETURN ADULT EYE 30 min EA OPTOMETRY Trang Umana OD    Location Instructions:     The [facility] clinic is located at [address] [address]                   NETO ValleN, RN  [facility]

## 2024-10-08 NOTE — TELEPHONE ENCOUNTER
I see that pt is scheduled for MRI on 10/25 & cardiology appointment on 11/14.     Will await for Dr. Chapman's advise.    Jet HERNANDEZ  Clinic RN  [facility]

## 2024-10-08 NOTE — TELEPHONE ENCOUNTER
Call patient for hospital follow up.  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    Judith Olivares RN

## 2024-10-08 NOTE — TELEPHONE ENCOUNTER
Attempt #1: Called and left voice message to call [phone] and ask to speak to a nurse.    Upon call back please  -complete discharge screening  -inquire if patient has scheduled follow up with cardiology    Awaiting call back at this time    Judith Olivares, RN

## 2024-10-08 NOTE — PROGRESS NOTES
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  How are your symptoms? (Red Flag symptoms escalate to triage hotline per guidelines): Improved  Do you know how to contact your clinic care team if you have future questions or changes to your health status? : Yes  Does the patient have their discharge instructions? : Yes  Does the patient have questions regarding their discharge instructions? : No  Were you started on any new medications or were there changes to any of your previous medications? : No  Does the patient have all of their medications?: Yes  Do you have questions regarding any of your medications? : No  Do you have all of your needed medical supplies or equipment (DME)?  (i.e. oxygen tank, CPAP, cane, etc.): Yes    Post-op ([facility] CTA Only)  If the patient had a surgery or procedure, do they have any questions for a nurse?: No    Post-op (Clinicians Only)  Did the patient have surgery or a procedure: Yes  Incision: closed  Drainage: No  Bleeding: none  Fever: No  Chills: No  Redness: No  Warmth: No  Swelling: No  Incision site pain: No  Closure: suture  Eating & Drinking: eating and drinking without complaints/concerns  PO Intake: regular diet  Additional Symptoms: decreased appetite  Bowel Function: normal  Date of last BM: 10/08/24  Urinary Status: voiding without complaint/concerns    Follow up Plan  Patient Instructions by Ike Coleman PA at 11/08/17 11:16 AM     Author:  Ike Coleman PA Service:  (none) Author Type:  Physician Assistant     Filed:  11/08/17 11:16 AM Encounter Date:  11/8/2017 Status:  Addendum     :  Ike Coleman PA (Physician Assistant)                    Iron in diet  Definition   Iron is a mineral found in every cell of the body. Iron is considered an essential mineral because it is needed to make hemoglobin, a part of blood cells.   Alternative Names   Diet - iron; Ferric acid; Ferrous acid; Ferritin   Function   The human body needs iron to make the oxygen-carrying proteins hemoglobin and myoglobin. Hemoglobin is found in red blood cells and myoglobin is found in muscles.   Food Sources   The best sources of iron include:   · Dried beans  · Dried fruits  · Eggs (especially egg yolks)  · Iron-fortified cereals  · Liver  · Lean red meat (especially beef)  · Oysters  · Poultry, dark red meat  · [place]  · Tuna  · Whole grains  Reasonable amounts of iron are also found in lamb, pork, and shellfish.   Iron from vegetables, fruits, grains, and supplements is harder for the body to absorb. These sources include:   Dried fruits:   · Prunes  · Raisins  · Apricots  Legumes:   · Lima beans  · Soybeans  · Dried beans and peas  · Kidney beans  Seeds:   · Almonds  · Brazil nuts  Vegetables:   · Broccoli  · Spinach  · Kale  · Collards  · Asparagus  · Dandelion greens  Whole grains:   · Wheat  · Millet  · Oats  · Brown rice  If you mix some lean meat, fish, or poultry with beans or dark leafy greens at a meal, you can improve absorption of vegetable sources of iron up to three times. Foods rich in vitamin C ( such as citrus, strawberries, tomatoes, and potatoes) also increase iron absorption. Cooking foods in a cast-iron skillet can also help to increase the amount of iron provided.   Some foods reduce iron absorption. For example, commercial black or pekoe teas contain      Discharge Follow-Up  Discharge follow up appointment scheduled in alignment with recommended follow up timeframe or Transitions of Risk Category? (Low = within 30 days; Moderate= within 14 days; High= within 7 days): Yes  Discharge Follow Up Appointment Date: 11/14/24  Discharge Follow Up Appointment Scheduled with?: Specialty Care Provider (Cardiology)    RN CC contacted patient for post-hospital follow up and to introduce Care Coordination. Full assessment not indicated as patient declined to have Care Coordination support at this time. He  was agreeable to receiving an introduction letter with additional information on Care Coordination via [employer]. Verified patient has access to [employer].     RN CC will send patient introduction letter via [employer].     Future Appointments   Date Time Provider Department Center   10/25/2024  8:45 AM SCIM[facility]1 SH[facility] CVIMG   11/14/2024  3:20 PM Yeimy Armendariz, NP SUUMHT UMP PSA CLIN   1/29/2025 11:00 AM Albert Chapman MD EAFP EA   1/29/2025  3:30 PM Albert Chapman MD EAFP EA   3/24/2025  4:00 PM Trang Umana OD EAOPTM EA     Outpatient Plan as outlined on AVS reviewed with patient.    For any urgent concerns, please contact our 24 hour nurse triage line: [phone] ([phone])       Heide Panchal RN, BSN, CPHN, [facility]  [facility], and [facility]  [email or web address]  Office: [phone]  Employed by [employer] [facility]   (On behalf of Ashleigh Olvera RN CC)               substances that bind to dietary iron so it cannot be used by the body.   Side Effects   LOW IRON LEVEL   The human body stores some iron to replace any that is lost. However, low iron level over a long period of time can lead to iron deficiency anemia. Symptoms include lack of energy, shortness of breath, headache, irritability, dizziness, or weight loss. Physical signs of iron deficiency are a pale tongue and spoon-shaped nails.   Those at risk for low iron level includes:   · Women who are menstruating, especially if they have heavy periods  · Women who are pregnant or who have just had a baby  · Long-distance runners  · People with any type of bleeding in the intestines (for example, a bleeding ulcer)  · People who frequently donate blood  · People with gastrointestinal conditions that make it hard to absorb nutrients from food  Babies and young children are at risk for low iron level if they do not get the right foods. Babies moving to solid foods should eat iron-rich foods. Infants are born with enough iron to last about six months. An infant's additional iron needs are met by breast milk. Infants that are not  should be given an iron supplement or iron-fortified infant formula.   Children between age 1 and 4 grow rapidly. This uses up iron in the body. Children of this age should be given iron-fortified foods or an iron supplement.   Milk is a very poor source of iron. Children who drink large quantities of milk and avoid other foods may develop \"milk anemia.\" Recommended milk intake is 2 to 3 cups (480 to 720 milliliters) per day for toddlers.   TOO MUCH IRON   The genetic disorder called hemochromatosis affects the body's ability to control how much iron is absorbed. This leads to too much iron in the body. Treatment consists of a low-iron diet, no iron supplements, and phlebotomy (blood removal) on a regular basis.   It is unlikely that a person would take too much iron. However, children can  sometimes develop iron poisoning by swallowing too many iron supplements. Symptoms of iron poisoning include:   · Fatigue  · Anorexia  · Dizziness  · Nausea  · Vomiting  · Headache  · Weight loss  · Shortness of breath  · Grayish color to the skin  Recommendations   The Food and Nutrition Board at the [place] of [employer] recommends the following:   Infants and children   · Younger than 6 months: 0.27 milligrams per day (mg/day)*  · 7 months to 1 year: 11 mg/day  · 1 to 3 years: 7 mg/day  · 4 to 8 years: 10 mg/day  *AI or Adequate Intake   Males   · 9 to 13 years: 8 mg/day  · 14 to 18 years: 11 mg/day  · Age 19 and older: 8 mg/day  Females   · 9 to 13 years: 8 mg/day  · 14 to 18 years: 15 mg/day  · 19 to 50 years: 18 mg/day  · 51 and older: 8 mg/day  · Pregnant women of all ages: 27 mg/day  · Lactating women 19 to 30 years: 9 mg/day  Women who are pregnant or producing breast milk may need different amounts of iron. Ask your health care provider what is appropriate for you.   References   [place] of [employer], Food and Nutrition Board. Dietary Reference Intakes for Vitamin A, Vitamin K, Arsenic, Boron, Chromium, Copper, Iodine, Iron, Manganese, Molybdenium, Nickel, Silicon, Vanadium, and Zinc. [place], [place]; National Academies Press; 2001. PMID: 17741005 www.ncbi.nlm.nih.gov/pubmed/49841790.   Mejia EMANUEL. Vitamins, trace minerals, and other micronutrients. In: Monie MATIAS, Lauren VALDOVINOS, eds. [employer] [facility]. 25th ed. [facility] [place]: Nalini Gaines; 2016:chap 218.   Les EP, Monica A, Lj KA, [facility]. Nutritional requirements. In: Starr RM, Bao BF, StAna Laura Suggs JW, Royal NF, eds. Raji Textbook of Pediatrics. 20th ed. [place], [place]: Nalini; 2016:chap 44.      Review Date: 1/7/2017  Reviewed By: Rosemary Mendez RD, [facility], [place], [place]. Also reviewed by David Zieve, MD, A, Medical Director, Maribel Sorto, , and the A.D.A.M. Editorial team.             Revision History        User Key Date/Time User Provider Type Action    > [N/A] 11/08/17 11:16 AM Ike Coleman PA Physician Assistant Addend     [N/A] 11/08/17 11:16 AM Ike Coleman PA Physician Assistant Addend     [N/A] 11/08/17 11:16 AM Ike Coleman PA Physician Assistant Sign

## 2024-10-08 NOTE — TELEPHONE ENCOUNTER
Definitely needs cards follow-up  PCP could possibly do a virtual call-will wait for PCP (only 1 JORDY spot open in the next month)

## 2024-10-17 ENCOUNTER — HOSPITAL ENCOUNTER (OUTPATIENT)
Dept: MRI IMAGING | Facility: CLINIC | Age: 59
Discharge: HOME | End: 2024-10-17
Attending: INTERNAL MEDICINE | Admitting: INTERNAL MEDICINE
Payer: COMMERCIAL

## 2024-10-17 DIAGNOSIS — U07.1 MYOCARDITIS DUE TO COVID-19 VIRUS: ICD-10-CM

## 2024-10-17 DIAGNOSIS — I21.4 NSTEMI (NON-ST ELEVATED MYOCARDIAL INFARCTION) (H): ICD-10-CM

## 2024-10-17 DIAGNOSIS — I40.0 MYOCARDITIS DUE TO COVID-19 VIRUS: ICD-10-CM

## 2024-10-17 PROCEDURE — 75561 CARDIAC MRI FOR MORPH W/DYE: CPT | Mod: 26 | Performed by: INTERNAL MEDICINE

## 2024-10-17 PROCEDURE — 255N000002 HC RX 255 OP 636: Performed by: INTERNAL MEDICINE

## 2024-10-17 PROCEDURE — 75561 CARDIAC MRI FOR MORPH W/DYE: CPT

## 2024-10-17 PROCEDURE — A9585 GADOBUTROL INJECTION: HCPCS | Performed by: INTERNAL MEDICINE

## 2024-10-17 RX ORDER — GADOBUTROL 604.72 MG/ML
0.12 INJECTION INTRAVENOUS ONCE
Status: COMPLETED | OUTPATIENT
Start: 2024-10-17 | End: 2024-10-17

## 2024-10-17 RX ADMIN — GADOBUTROL 12 ML: 604.72 INJECTION INTRAVENOUS at 13:41

## 2024-10-18 ENCOUNTER — TELEPHONE (OUTPATIENT)
Dept: CARDIOLOGY | Facility: CLINIC | Age: 59
End: 2024-10-18
Payer: COMMERCIAL

## 2024-10-18 NOTE — TELEPHONE ENCOUNTER
cMRI 10/17/2024 noted. Ordered post-discharge to check diagnosis of viral myocarditis in the setting of (+) covid.    Patient to see HEMA Sis Armendariz on 11/14/2024    MRI:  1. The left ventricle is normal in cavity size and wall thickness. There are no regional wall motion abnormalities. The global systolic function is normal. The LVEF is 79%.   2. The right ventricle is normal in cavity size. The global systolic function is normal. The RVEF is 56%.   3. Both atria are normal in size.  4. There is no significant valvular disease.   5. Late gadolinium enhancement imaging: epicardial hyperenhancement in the basal and mid lateral segments.   6. There is no pericardial effusion.  7. There is no intracardiac thrombus.  8.  There is patchy myocardial edema in the basal and mid lateral segments.   CONCLUSIONS:   Normal biventricular function, LVEF 79% and RVEF 56%.  Features of myocarditis present with patchy myocardial edema and epicardial fibrosis in the basal and mid lateral segments.    Will message Dr. Hartley to review for any further recommendations      10/22/2024 Brenda Edmondson MD Anderson, Shoshana VICK RN  Cc: [facility] Heart Team 2  Caller: Unspecified (4 days ago,  8:00 AM)  None.  Standard myocarditis therapy.    Thanks.  Dr. Hartley

## 2024-11-14 ENCOUNTER — OFFICE VISIT (OUTPATIENT)
Dept: CARDIOLOGY | Facility: CLINIC | Age: 59
End: 2024-11-14
Payer: COMMERCIAL

## 2024-11-14 VITALS
HEART RATE: 97 BPM | OXYGEN SATURATION: 95 % | HEIGHT: 68 IN | DIASTOLIC BLOOD PRESSURE: 89 MMHG | SYSTOLIC BLOOD PRESSURE: 140 MMHG | BODY MASS INDEX: 33.8 KG/M2 | WEIGHT: 223 LBS

## 2024-11-14 DIAGNOSIS — U07.1 MYOCARDITIS DUE TO COVID-19 VIRUS: Primary | ICD-10-CM

## 2024-11-14 DIAGNOSIS — I21.4 NSTEMI (NON-ST ELEVATED MYOCARDIAL INFARCTION) (H): ICD-10-CM

## 2024-11-14 DIAGNOSIS — I40.0 MYOCARDITIS DUE TO COVID-19 VIRUS: Primary | ICD-10-CM

## 2024-11-14 ASSESSMENT — ASTHMA QUESTIONNAIRES
QUESTION_1 LAST FOUR WEEKS HOW MUCH OF THE TIME DID YOUR ASTHMA KEEP YOU FROM GETTING AS MUCH DONE AT WORK, SCHOOL OR AT HOME: NONE OF THE TIME
QUESTION_2 LAST FOUR WEEKS HOW OFTEN HAVE YOU HAD SHORTNESS OF BREATH: ONCE OR TWICE A WEEK
QUESTION_3 LAST FOUR WEEKS HOW OFTEN DID YOUR ASTHMA SYMPTOMS (WHEEZING, COUGHING, SHORTNESS OF BREATH, CHEST TIGHTNESS OR PAIN) WAKE YOU UP AT NIGHT OR EARLIER THAN USUAL IN THE MORNING: NOT AT ALL
QUESTION_4 LAST FOUR WEEKS HOW OFTEN HAVE YOU USED YOUR RESCUE INHALER OR NEBULIZER MEDICATION (SUCH AS ALBUTEROL): ONCE A WEEK OR LESS
ACT_TOTALSCORE: 22
ACT_TOTALSCORE: 22
QUESTION_5 LAST FOUR WEEKS HOW WOULD YOU RATE YOUR ASTHMA CONTROL: WELL CONTROLLED

## 2024-11-14 NOTE — PROGRESS NOTES
Cardiology Clinic Progress Note  Phillip Nassar MRN# [id number]   YOB: 1965 Age: 59 year old   Primary Cardiologist: Dr. Hartley Reason for visit: Posthospital follow-up            Assessment and Plan:       1.  Viral myocarditis   -10/2024 cardiac MRI with features of myocarditis with patchy myocardial edema and epicardial fibrosis in the basal and mid lateral segments  -Symptomatically improved    2. NSTEMI in the setting of recent COVID infection  -Troponins elevated but flat, likely secondary to demand in the setting of his COVID infection and #1    3. Trivial coronary artery disease   -10/2024 coronary angiogram with only trivial coronary disease but evidence of vasospasm    Phillip feels well overall.  Discussed the findings of his cardiac MRI and we reviewed the importance of limiting exertion, NSAIDs, and heavy alcohol use.  Will plan to repeat his MRI in 3 months to reassess for resolution of his myocarditis.  We also discussed importance of aggressive risk factor management in the setting of newly diagnosed coronary disease.  His most recent LDL was 75, which is acceptable considering his disease was trivial, however could consider addition of statin therapy in the future.    Plan:  Avoid NSAIDs, exertion/physical exercise, and alcohol use  Repeat MRI in 3 months    Follow up: Follow up with Dr. Hartley in 3 months         History of Presenting Illness:    Phillip Nassar is a very pleasant 59 year old male with a history of DJD and asthma.     Patient was hospitalized in early October following 4 days of chest pain in the setting of a COVID infection with significant respiratory symptoms and myalgias.  He elevated with flat troponins and underwent a coronary angiogram which showed very mild nonobstructive coronary disease with a 10% mid RCA lesion and 5% mid LAD lesion and some evidence of vasospasm in the LAD.  The left ventricle was notably hyperdynamic and there were some borderline changes of  spike/dome possibly representative of hocm.  He was discharged and underwent an outpatient cardiac MRI which showed hyperdynamic left ventricular ejection fraction of 79%, normal right ventricular size and function, normal biatrial size, no significant valvular disease, epicardial hyperenhancement of the basal and mid lateral segments, no pericardial effusion or thrombus as well as patchy myocardial edema in the basal and mid lateral segments felt to be features of myocarditis.    Patient is here today for a posthospital follow-up. Patient reports feeling good.  He did have 1 episode of chest pain following deer hunting after he came back to his cabin and drink 3 cups of coffee.  It resolved relatively quickly.    Denies dizziness, lightheadedness or other presyncopal symptoms. Denies tachycardia or palpitations.     Most recent labs from 10/5/2024 show total cholesterol 132, HDL 36, LDL 75, triglycerides 104, sodium 136, potassium 4.6, BUN 30.2, creatinine 0.8, GFR greater than 90, hemoglobin 14.6, platelets 240, ALT 36.     Blood pressure 140/89 and HR 97 in clinic today.        Recent Hospitalizations   As above          Social History      Social History     Socioeconomic History    Marital status:      Spouse name: Not on file    Number of children: Not on file    Years of education: Not on file    Highest education level: Bachelor's degree (e.g., BA, AB, BS)   Occupational History    Not on file   Tobacco Use    Smoking status: Never    Smokeless tobacco: Never   Vaping Use    Vaping status: Never Used   Substance and Sexual Activity    Alcohol use: Yes     Comment: socially    Drug use: No    Sexual activity: Yes     Partners: Female   Other Topics Concern    Parent/sibling w/ CABG, MI or angioplasty before 65F 55M? No   Social History Narrative    Not on file     Social Drivers of Health     Financial Resource Strain: Low Risk  (10/6/2024)    Financial Resource Strain     Within the past 12 months,  have you or your family members you live with been unable to get utilities (heat, electricity) when it was really needed?: No   Food Insecurity: Low Risk  (10/6/2024)    Food Insecurity     Within the past 12 months, did you worry that your food would run out before you got money to buy more?: No     Within the past 12 months, did the food you bought just not last and you didn t have money to get more?: No   Transportation Needs: Low Risk  (10/6/2024)    Transportation Needs     Within the past 12 months, has lack of transportation kept you from medical appointments, getting your medicines, non-medical meetings or appointments, work, or from getting things that you need?: No   Physical Activity: Insufficiently Active (1/10/2023)    Exercise Vital Sign     Days of Exercise per Week: 1 day     Minutes of Exercise per Session: 30 min   Stress: No Stress Concern Present (1/10/2023)    [place] [employer] - Occupational Stress Questionnaire     Feeling of Stress : Only a little   Social Connections: Socially Isolated (1/10/2023)    Social Connection and Isolation Panel [NHANES]     Frequency of Communication with Friends and Family: Once a week     Frequency of Social Gatherings with Friends and Family: Never     Attends Yarsanism Services: Never     Active Member of Clubs or Organizations: No     Attends Club or Organization Meetings: Not on file     Marital Status:    Interpersonal Safety: High Risk (10/4/2024)    Interpersonal Safety     Do you feel physically and emotionally safe where you currently live?: No     Within the past 12 months, have you been hit, slapped, kicked or otherwise physically hurt by someone?: No     Within the past 12 months, have you been humiliated or emotionally abused in other ways by your partner or ex-partner?: No   Housing Stability: Low Risk  (10/6/2024)    Housing Stability     Do you have housing? : Yes     Are you worried about losing your housing?: No  "           Review of Systems:   Skin:        Eyes:       ENT:       Respiratory:  Negative    Cardiovascular:    Positive for  Gastroenterology:      Genitourinary:       Musculoskeletal:       Neurologic:       Psychiatric:       Heme/Lymph/Imm:       Endocrine:              Physical Exam:   Vitals: BP (!) 140/89   Pulse 97   Ht 1.73 m (5' 8.11\")   Wt 101.2 kg (223 lb)   SpO2 95%   BMI 33.80 kg/m     Wt Readings from Last 4 Encounters:   11/14/24 101.2 kg (223 lb)   10/04/24 99.4 kg (219 lb 1.6 oz)   01/26/24 100.1 kg (220 lb 9.6 oz)   05/03/23 98 kg (216 lb)     GEN: well nourished, in no acute distress.  HEENT:  Pupils equal, round. Sclerae nonicteric.   NECK: Supple, no masses appreciated.   C/V:  Regular rate and rhythm, no murmur, rub or gallop.    RESP: Respirations are unlabored. Clear to auscultation bilaterally without wheezing, rales, or rhonchi.  GI: Abdomen soft, nontender.  EXTREM: No LE edema.  NEURO: Alert and oriented, cooperative.  SKIN: Warm and dry.        Data:     LIPID RESULTS:  Lab Results   Component Value Date    CHOL 132 10/05/2024    CHOL 200 (H) 12/10/2019    HDL 36 (L) 10/05/2024    HDL 37 (L) 12/10/2019    LDL 75 10/05/2024     (H) 12/10/2019    TRIG 104 10/05/2024    TRIG 234 (H) 12/10/2019    CHOLHDLRATIO 4.4 08/31/2011     LIVER ENZYME RESULTS:  Lab Results   Component Value Date    AST 69 (H) 10/05/2024    AST 16 12/10/2019    ALT 36 10/05/2024    ALT 38 12/10/2019     CBC RESULTS:  Lab Results   Component Value Date    WBC 8.1 10/07/2024    WBC 11.1 (H) 02/14/2019    RBC 5.29 10/07/2024    RBC 5.57 02/14/2019    HGB 14.6 10/07/2024    HGB 15.8 02/14/2019    HCT 45.4 10/07/2024    HCT 47.6 02/14/2019    MCV 86 10/07/2024    MCV 86 02/14/2019    MCH 27.6 10/07/2024    MCH 28.4 02/14/2019    MCHC 32.2 10/07/2024    MCHC 33.2 02/14/2019    RDW 13.1 10/07/2024    RDW 13.2 02/14/2019     10/07/2024     02/14/2019     BMP RESULTS:  Lab Results   Component Value " "Date     10/07/2024     12/10/2019    POTASSIUM 4.5 10/07/2024    POTASSIUM 4.6 10/07/2024    POTASSIUM 4.0 09/08/2021    POTASSIUM 4.0 12/10/2019    CHLORIDE 100 10/07/2024    CHLORIDE 107 09/08/2021    CHLORIDE 105 12/10/2019    CO2 18 (L) 10/07/2024    CO2 28 09/08/2021    CO2 29 12/10/2019    ANIONGAP 18 (H) 10/07/2024    ANIONGAP 2 (L) 09/08/2021    ANIONGAP 6 12/10/2019    GLC 91 10/07/2024    GLC 98 09/08/2021    GLC 89 12/10/2019    BUN 13.2 10/07/2024    BUN 10 09/08/2021    BUN 13 12/10/2019    CR 0.92 10/07/2024    CR 0.78 12/10/2019    GFRESTIMATED >90 10/07/2024    GFRESTIMATED >60 09/08/2021    GFRESTIMATED >90 12/10/2019    GFRESTBLACK >90 12/10/2019    VINICIUS 9.3 10/07/2024    VINICIUS 8.8 12/10/2019      A1C RESULTS:  No results found for: \"A1C\"  INR RESULTS:  No results found for: \"INR\"         Medications     Current Outpatient Medications   Medication Sig Dispense Refill    albuterol (PROAIR HFA/PROVENTIL HFA/VENTOLIN HFA) 108 (90 Base) MCG/ACT inhaler Inhale 2-4 puffs into the lungs every 4 hours as needed for shortness of breath or wheezing 18 g 3    guaiFENesin-codeine (ROBITUSSIN AC) 100-10 MG/5ML solution Take 5 mLs by mouth every 4 hours as needed for cough. 60 mL 0    pseudoePHEDrine (SUDAFED) 120 MG 12 hr tablet Take 120 mg by mouth every 12 hours as needed for congestion.      sildenafil (VIAGRA) 100 MG tablet take 1/2 to one tab daily as needed for erectile dysfunction. take 30 min to 4 hours before sexual activity 4 tablet 11          Past Medical History     Past Medical History:   Diagnosis Date    Allergic rhinitis     Diverticulitis     Mild intermittent asthma      Past Surgical History:   Procedure Laterality Date    ARTHROSCOPY KNEE      COLONOSCOPY  03/07/2014    Procedure: COLONOSCOPY;  Colonoscopy  ;  Surgeon: Gucci Martin MD;  Location:  GI    COLONOSCOPY N/A 02/28/2017    Procedure: COLONOSCOPY;  Surgeon: Destiny Burks MD;  Location: [facility]    COLONOSCOPY " N/A 01/30/2023    Procedure: COLONOSCOPY (crsal);  Surgeon: Destiny Burks MD;  Location:  GI    CV CORONARY ANGIOGRAM N/A 10/7/2024    Procedure: Coronary Angiogram;  Surgeon: Navneet Vanegas MD;  Location:  HEART CARDIAC CATH LAB    CV LEFT HEART CATH N/A 10/7/2024    Procedure: Left Heart Catheterization;  Surgeon: Navneet Vanegas MD;  Location:  HEART CARDIAC CATH LAB    CV LEFT VENTRICULOGRAM N/A 10/7/2024    Procedure: Left Ventriculogram;  Surgeon: Navneet Vanegas MD;  Location:  HEART CARDIAC CATH LAB    LASIK Bilateral     SINUS SURGERY       Family History   Problem Relation Age of Onset    Alzheimer Disease Mother     Diabetes Father     Colon Cancer Paternal Grandmother     Colon Cancer Paternal Aunt     Cancer - colorectal Paternal Aunt     Colon Cancer Paternal Uncle     Cancer - colorectal Paternal Uncle     Glaucoma No family hx of     Macular Degeneration No family hx of             Allergies   No known drug allergy        Yeimy Armendariz NP  [facility]

## 2024-11-14 NOTE — LETTER
11/14/2024    Albert Chapman MD  [address] [place] [address]    RE: Phillip Nassar       Dear Colleague,     I had the pleasure of seeing Phillip Nassar in the [facility].    Cardiology Clinic Progress Note  Phillip Nassar MRN# [id number]   YOB: 1965 Age: 59 year old   Primary Cardiologist: Dr. Hartley Reason for visit: Posthospital follow-up            Assessment and Plan:       1.  Viral myocarditis   -10/2024 cardiac MRI with features of myocarditis with patchy myocardial edema and epicardial fibrosis in the basal and mid lateral segments  -Symptomatically improved    2. NSTEMI in the setting of recent COVID infection  -Troponins elevated but flat, likely secondary to demand in the setting of his COVID infection and #1    3. Trivial coronary artery disease   -10/2024 coronary angiogram with only trivial coronary disease but evidence of vasospasm    Phillip feels well overall.  Discussed the findings of his cardiac MRI and we reviewed the importance of limiting exertion, NSAIDs, and heavy alcohol use.  Will plan to repeat his MRI in 3 months to reassess for resolution of his myocarditis.  We also discussed importance of aggressive risk factor management in the setting of newly diagnosed coronary disease.  His most recent LDL was 75, which is acceptable considering his disease was trivial, however could consider addition of statin therapy in the future.    Plan:  Avoid NSAIDs, exertion/physical exercise, and alcohol use  Repeat MRI in 3 months    Follow up: Follow up with Dr. Hartley in 3 months         History of Presenting Illness:    Phillip Nassar is a very pleasant 59 year old male with a history of DJD and asthma.     Patient was hospitalized in early October following 4 days of chest pain in the setting of a COVID infection with significant respiratory symptoms and myalgias.  He elevated with flat troponins and underwent a coronary angiogram which showed very  mild nonobstructive coronary disease with a 10% mid RCA lesion and 5% mid LAD lesion and some evidence of vasospasm in the LAD.  The left ventricle was notably hyperdynamic and there were some borderline changes of spike/dome possibly representative of hocm.  He was discharged and underwent an outpatient cardiac MRI which showed hyperdynamic left ventricular ejection fraction of 79%, normal right ventricular size and function, normal biatrial size, no significant valvular disease, epicardial hyperenhancement of the basal and mid lateral segments, no pericardial effusion or thrombus as well as patchy myocardial edema in the basal and mid lateral segments felt to be features of myocarditis.    Patient is here today for a posthospital follow-up. Patient reports feeling good.  He did have 1 episode of chest pain following deer hunting after he came back to his cabin and drink 3 cups of coffee.  It resolved relatively quickly.    Denies dizziness, lightheadedness or other presyncopal symptoms. Denies tachycardia or palpitations.     Most recent labs from 10/5/2024 show total cholesterol 132, HDL 36, LDL 75, triglycerides 104, sodium 136, potassium 4.6, BUN 30.2, creatinine 0.8, GFR greater than 90, hemoglobin 14.6, platelets 240, ALT 36.     Blood pressure 140/89 and HR 97 in clinic today.        Recent Hospitalizations   As above          Social History      Social History     Socioeconomic History     Marital status:      Spouse name: Not on file     Number of children: Not on file     Years of education: Not on file     Highest education level: Bachelor's degree (e.g., BA, AB, BS)   Occupational History     Not on file   Tobacco Use     Smoking status: Never     Smokeless tobacco: Never   Vaping Use     Vaping status: Never Used   Substance and Sexual Activity     Alcohol use: Yes     Comment: socially     Drug use: No     Sexual activity: Yes     Partners: Female   Other Topics Concern     Parent/sibling w/  CABG, MI or angioplasty before 65F 55M? No   Social History Narrative     Not on file     Social Drivers of Health     Financial Resource Strain: Low Risk  (10/6/2024)    Financial Resource Strain      Within the past 12 months, have you or your family members you live with been unable to get utilities (heat, electricity) when it was really needed?: No   Food Insecurity: Low Risk  (10/6/2024)    Food Insecurity      Within the past 12 months, did you worry that your food would run out before you got money to buy more?: No      Within the past 12 months, did the food you bought just not last and you didn t have money to get more?: No   Transportation Needs: Low Risk  (10/6/2024)    Transportation Needs      Within the past 12 months, has lack of transportation kept you from medical appointments, getting your medicines, non-medical meetings or appointments, work, or from getting things that you need?: No   Physical Activity: Insufficiently Active (1/10/2023)    Exercise Vital Sign      Days of Exercise per Week: 1 day      Minutes of Exercise per Session: 30 min   Stress: No Stress Concern Present (1/10/2023)    [employer] - Occupational Stress Questionnaire      Feeling of Stress : Only a little   Social Connections: Socially Isolated (1/10/2023)    Social Connection and Isolation Panel [NHANES]      Frequency of Communication with Friends and Family: Once a week      Frequency of Social Gatherings with Friends and Family: Never      Attends [employer]ism Services: Never      Active Member of Clubs or Organizations: No      Attends Club or Organization Meetings: Not on file      Marital Status:    Interpersonal Safety: High Risk (10/4/2024)    Interpersonal Safety      Do you feel physically and emotionally safe where you currently live?: No      Within the past 12 months, have you been hit, slapped, kicked or otherwise physically hurt by someone?: No      Within the past 12 months,  "have you been humiliated or emotionally abused in other ways by your partner or ex-partner?: No   Housing Stability: Low Risk  (10/6/2024)    Housing Stability      Do you have housing? : Yes      Are you worried about losing your housing?: No            Review of Systems:   Skin:        Eyes:       ENT:       Respiratory:  Negative    Cardiovascular:    Positive for  Gastroenterology:      Genitourinary:       Musculoskeletal:       Neurologic:       Psychiatric:       Heme/Lymph/Imm:       Endocrine:              Physical Exam:   Vitals: BP (!) 140/89   Pulse 97   Ht 1.73 m (5' 8.11\")   Wt 101.2 kg (223 lb)   SpO2 95%   BMI 33.80 kg/m     Wt Readings from Last 4 Encounters:   11/14/24 101.2 kg (223 lb)   10/04/24 99.4 kg (219 lb 1.6 oz)   01/26/24 100.1 kg (220 lb 9.6 oz)   05/03/23 98 kg (216 lb)     GEN: well nourished, in no acute distress.  HEENT:  Pupils equal, round. Sclerae nonicteric.   NECK: Supple, no masses appreciated.   C/V:  Regular rate and rhythm, no murmur, rub or gallop.    RESP: Respirations are unlabored. Clear to auscultation bilaterally without wheezing, rales, or rhonchi.  GI: Abdomen soft, nontender.  EXTREM: No LE edema.  NEURO: Alert and oriented, cooperative.  SKIN: Warm and dry.        Data:     LIPID RESULTS:  Lab Results   Component Value Date    CHOL 132 10/05/2024    CHOL 200 (H) 12/10/2019    HDL 36 (L) 10/05/2024    HDL 37 (L) 12/10/2019    LDL 75 10/05/2024     (H) 12/10/2019    TRIG 104 10/05/2024    TRIG 234 (H) 12/10/2019    CHOLHDLRATIO 4.4 08/31/2011     LIVER ENZYME RESULTS:  Lab Results   Component Value Date    AST 69 (H) 10/05/2024    AST 16 12/10/2019    ALT 36 10/05/2024    ALT 38 12/10/2019     CBC RESULTS:  Lab Results   Component Value Date    WBC 8.1 10/07/2024    WBC 11.1 (H) 02/14/2019    RBC 5.29 10/07/2024    RBC 5.57 02/14/2019    HGB 14.6 10/07/2024    HGB 15.8 02/14/2019    HCT 45.4 10/07/2024    HCT 47.6 02/14/2019    MCV 86 10/07/2024    MCV 86 " "02/14/2019    MCH 27.6 10/07/2024    MCH 28.4 02/14/2019    MCHC 32.2 10/07/2024    MCHC 33.2 02/14/2019    RDW 13.1 10/07/2024    RDW 13.2 02/14/2019     10/07/2024     02/14/2019     BMP RESULTS:  Lab Results   Component Value Date     10/07/2024     12/10/2019    POTASSIUM 4.5 10/07/2024    POTASSIUM 4.6 10/07/2024    POTASSIUM 4.0 09/08/2021    POTASSIUM 4.0 12/10/2019    CHLORIDE 100 10/07/2024    CHLORIDE 107 09/08/2021    CHLORIDE 105 12/10/2019    CO2 18 (L) 10/07/2024    CO2 28 09/08/2021    CO2 29 12/10/2019    ANIONGAP 18 (H) 10/07/2024    ANIONGAP 2 (L) 09/08/2021    ANIONGAP 6 12/10/2019    GLC 91 10/07/2024    GLC 98 09/08/2021    GLC 89 12/10/2019    BUN 13.2 10/07/2024    BUN 10 09/08/2021    BUN 13 12/10/2019    CR 0.92 10/07/2024    CR 0.78 12/10/2019    GFRESTIMATED >90 10/07/2024    GFRESTIMATED >60 09/08/2021    GFRESTIMATED >90 12/10/2019    GFRESTBLACK >90 12/10/2019    VINICIUS 9.3 10/07/2024    VINICIUS 8.8 12/10/2019      A1C RESULTS:  No results found for: \"A1C\"  INR RESULTS:  No results found for: \"INR\"         Medications     Current Outpatient Medications   Medication Sig Dispense Refill     albuterol (PROAIR HFA/PROVENTIL HFA/VENTOLIN HFA) 108 (90 Base) MCG/ACT inhaler Inhale 2-4 puffs into the lungs every 4 hours as needed for shortness of breath or wheezing 18 g 3     guaiFENesin-codeine (ROBITUSSIN AC) 100-10 MG/5ML solution Take 5 mLs by mouth every 4 hours as needed for cough. 60 mL 0     pseudoePHEDrine (SUDAFED) 120 MG 12 hr tablet Take 120 mg by mouth every 12 hours as needed for congestion.       sildenafil (VIAGRA) 100 MG tablet take 1/2 to one tab daily as needed for erectile dysfunction. take 30 min to 4 hours before sexual activity 4 tablet 11          Past Medical History     Past Medical History:   Diagnosis Date     Allergic rhinitis      Diverticulitis      Mild intermittent asthma      Past Surgical History:   Procedure Laterality Date     ARTHROSCOPY " KNEE       COLONOSCOPY  03/07/2014    Procedure: COLONOSCOPY;  Colonoscopy  ;  Surgeon: Gucci Martin MD;  Location:  GI     COLONOSCOPY N/A 02/28/2017    Procedure: COLONOSCOPY;  Surgeon: Destiny Burks MD;  Location: [facility]     COLONOSCOPY N/A 01/30/2023    Procedure: COLONOSCOPY (crsal);  Surgeon: Destiny Burks MD;  Location:  GI     CV CORONARY ANGIOGRAM N/A 10/7/2024    Procedure: Coronary Angiogram;  Surgeon: Navneet Vanegas MD;  Location:  HEART CARDIAC CATH LAB     CV LEFT HEART CATH N/A 10/7/2024    Procedure: Left Heart Catheterization;  Surgeon: Navneet Vanegas MD;  Location:  HEART CARDIAC CATH LAB     CV LEFT VENTRICULOGRAM N/A 10/7/2024    Procedure: Left Ventriculogram;  Surgeon: Navneet Vanegas MD;  Location:  HEART CARDIAC CATH LAB     LASIK Bilateral      SINUS SURGERY       Family History   Problem Relation Age of Onset     Alzheimer Disease Mother      Diabetes Father      Colon Cancer Paternal Grandmother      Colon Cancer Paternal Aunt      Cancer - colorectal Paternal Aunt      Colon Cancer Paternal Uncle      Cancer - colorectal Paternal Uncle      Glaucoma No family hx of      Macular Degeneration No family hx of             Allergies   No known drug allergy        Yeimy Armendariz NP  [facility]       Thank you for allowing me to participate in the care of your patient.      Sincerely,     Yeimy Armendariz NP     [facility]  cc:   Albert Chapman MD  [address] [place] [address]

## 2024-11-14 NOTE — PATIENT INSTRUCTIONS
Today's Recommendations    Avoid heavy exertion/sports/lifting   Avoid alcohol use and NSAIDS (anti-inflammatories)  We will repeat your MRI in 2 months  Continue all medications without changes.  Please follow up with Dr. Hartley in 3 months.    Please send a [facility] message or call [phone] to the RN team with questions or concerns.     Scheduling number [phone]  CARLEE Christopher, CNP

## 2024-11-15 ENCOUNTER — OFFICE VISIT (OUTPATIENT)
Dept: PEDIATRICS | Facility: CLINIC | Age: 59
End: 2024-11-15
Payer: COMMERCIAL

## 2024-11-15 VITALS
HEIGHT: 68 IN | OXYGEN SATURATION: 95 % | BODY MASS INDEX: 33.72 KG/M2 | TEMPERATURE: 97.6 F | WEIGHT: 222.5 LBS | RESPIRATION RATE: 14 BRPM | DIASTOLIC BLOOD PRESSURE: 72 MMHG | SYSTOLIC BLOOD PRESSURE: 124 MMHG | HEART RATE: 98 BPM

## 2024-11-15 DIAGNOSIS — N52.9 ERECTILE DYSFUNCTION, UNSPECIFIED ERECTILE DYSFUNCTION TYPE: ICD-10-CM

## 2024-11-15 DIAGNOSIS — U07.1 INFECTION DUE TO 2019 NOVEL CORONAVIRUS: ICD-10-CM

## 2024-11-15 DIAGNOSIS — I51.4 MYOCARDITIS, UNSPECIFIED CHRONICITY, UNSPECIFIED MYOCARDITIS TYPE (H): Primary | ICD-10-CM

## 2024-11-15 PROCEDURE — 99214 OFFICE O/P EST MOD 30 MIN: CPT | Performed by: PHYSICIAN ASSISTANT

## 2024-11-15 RX ORDER — SILDENAFIL 100 MG/1
TABLET, FILM COATED ORAL
Qty: 4 TABLET | Refills: 5 | Status: SHIPPED | OUTPATIENT
Start: 2024-11-15

## 2024-11-15 RX ORDER — ALBUTEROL SULFATE 90 UG/1
2-4 INHALANT RESPIRATORY (INHALATION) EVERY 4 HOURS PRN
Qty: 18 G | Refills: 5 | Status: SHIPPED | OUTPATIENT
Start: 2024-11-15

## 2024-11-15 NOTE — PROGRESS NOTES
"  Assessment & Plan     Myocarditis, unspecified chronicity, unspecified myocarditis type (H)    - Overall improving and feeling stable.    - Following with cardiology.  Visit noted reviewed from yesterday.      Plan by cardiology:  Plan:  Avoid NSAIDs, exertion/physical exercise, and alcohol use  Repeat MRI in 3 months     Follow up: Follow up with Dr. Hartley in 3 months     Infection due to 2019 novel coronavirus    - albuterol (PROAIR HFA/PROVENTIL HFA/VENTOLIN HFA) 108 (90 Base) MCG/ACT inhaler; Inhale 2-4 puffs into the lungs every 4 hours as needed for shortness of breath or wheezing.    Erectile dysfunction, unspecified erectile dysfunction type    - sildenafil (VIAGRA) 100 MG tablet; take 1/2 to one tab daily as needed for erectile dysfunction. take 30 min to 4 hours before sexual activity    - Patient given medication refills, but was advised to not use the Viagra until cleared by cardiology.  He is to not engage in exertional activity per cardiology and needs their approval for sexual activity and the use of Sildenafil.      Patient to followup as needed.  Patient understands and agrees with the plan today.        MED REC REQUIRED  Post Medication Reconciliation Status:   BMI  Estimated body mass index is 33.72 kg/m  as calculated from the following:    Height as of this encounter: 1.73 m (5' 8.11\").    Weight as of this encounter: 100.9 kg (222 lb 8 oz).       Ne Fisher is a 59 year old, presenting for the following health issues:  Follow Up (Follow up from myocarditis )      11/15/2024     9:20 AM   Additional Questions   Roomed by Rita Dawkins CMA     HPI     Patient is here for a followup of his myocarditis.  He was in the hospital on 10/04 and then saw the cardiology team in followup yesterday, 11/14/2024.  Overall he reports that he is doing well.  He is feeling improved, but initially did not know he is to refrain from exertion and exercise.  He has his followup plan with cardiology.      He " "does not have new concerns today.  He does want some regular medications renewed today.        Review of Systems  Constitutional, neuro, ENT, endocrine, pulmonary, cardiac, gastrointestinal, genitourinary, musculoskeletal, integument and psychiatric systems are negative, except as otherwise noted.      Objective    /72 (BP Location: Right arm, Patient Position: Sitting, Cuff Size: Adult Large)   Pulse 98   Temp 97.6  F (36.4  C) (Temporal)   Resp 14   Ht 1.73 m (5' 8.11\")   Wt 100.9 kg (222 lb 8 oz)   SpO2 95%   BMI 33.72 kg/m    Body mass index is 33.72 kg/m .  Physical Exam   GENERAL: alert and no distress  EYES: Eyes grossly normal to inspection, PERRL and conjunctivae and sclerae normal  NECK: no adenopathy, no asymmetry, masses, or scars  RESP: lungs clear to auscultation - no rales, rhonchi or wheezes  CV: regular rate and rhythm, normal S1 S2, no S3 or S4, no murmur, click or rub, no peripheral edema  MS: no gross musculoskeletal defects noted, no edema          Signed Electronically by: Yarelis Chino PA-C    "

## 2024-11-15 NOTE — Clinical Note
Hello,   I saw this patient in followup.  He appears to be doing well and followed with you guys as well.  He wanted his medications refilled, one being sildenafil.  I refilled his medications, but did inform him that he needs to have clearance from cardiology to use this medication and engage in sexual activity, as he appears to be on restriction.  Do you have thoughts on this?  Thank you,  Yarelis Chino PA-C

## 2024-12-09 ENCOUNTER — OFFICE VISIT (OUTPATIENT)
Dept: PEDIATRICS | Facility: CLINIC | Age: 59
End: 2024-12-09
Payer: COMMERCIAL

## 2024-12-09 VITALS
HEIGHT: 68 IN | DIASTOLIC BLOOD PRESSURE: 84 MMHG | HEART RATE: 85 BPM | SYSTOLIC BLOOD PRESSURE: 125 MMHG | WEIGHT: 213.4 LBS | OXYGEN SATURATION: 98 % | RESPIRATION RATE: 20 BRPM | BODY MASS INDEX: 32.34 KG/M2 | TEMPERATURE: 97.1 F

## 2024-12-09 DIAGNOSIS — R14.0 BLOATING: ICD-10-CM

## 2024-12-09 DIAGNOSIS — R10.84 GENERALIZED ABDOMINAL PAIN: Primary | ICD-10-CM

## 2024-12-09 DIAGNOSIS — R19.7 DIARRHEA, UNSPECIFIED TYPE: ICD-10-CM

## 2024-12-09 DIAGNOSIS — Z87.19 HISTORY OF DIVERTICULITIS: ICD-10-CM

## 2024-12-09 DIAGNOSIS — R11.0 NAUSEA: ICD-10-CM

## 2024-12-09 LAB
ALBUMIN UR-MCNC: NEGATIVE MG/DL
APPEARANCE UR: CLEAR
BASOPHILS # BLD AUTO: 0 10E3/UL (ref 0–0.2)
BASOPHILS NFR BLD AUTO: 0 %
BILIRUB UR QL STRIP: NEGATIVE
COLOR UR AUTO: YELLOW
EOSINOPHIL # BLD AUTO: 0.2 10E3/UL (ref 0–0.7)
EOSINOPHIL NFR BLD AUTO: 3 %
ERYTHROCYTE [DISTWIDTH] IN BLOOD BY AUTOMATED COUNT: 13.5 % (ref 10–15)
GLUCOSE UR STRIP-MCNC: NEGATIVE MG/DL
HCT VFR BLD AUTO: 47.4 % (ref 40–53)
HGB BLD-MCNC: 15.2 G/DL (ref 13.3–17.7)
HGB UR QL STRIP: NEGATIVE
IMM GRANULOCYTES # BLD: 0 10E3/UL
IMM GRANULOCYTES NFR BLD: 0 %
KETONES UR STRIP-MCNC: NEGATIVE MG/DL
LEUKOCYTE ESTERASE UR QL STRIP: NEGATIVE
LYMPHOCYTES # BLD AUTO: 1.4 10E3/UL (ref 0.8–5.3)
LYMPHOCYTES NFR BLD AUTO: 28 %
MCH RBC QN AUTO: 27.3 PG (ref 26.5–33)
MCHC RBC AUTO-ENTMCNC: 32.1 G/DL (ref 31.5–36.5)
MCV RBC AUTO: 85 FL (ref 78–100)
MONOCYTES # BLD AUTO: 0.5 10E3/UL (ref 0–1.3)
MONOCYTES NFR BLD AUTO: 10 %
NEUTROPHILS # BLD AUTO: 2.9 10E3/UL (ref 1.6–8.3)
NEUTROPHILS NFR BLD AUTO: 59 %
NITRATE UR QL: NEGATIVE
PH UR STRIP: 6 [PH] (ref 5–7)
PLATELET # BLD AUTO: 272 10E3/UL (ref 150–450)
RBC # BLD AUTO: 5.56 10E6/UL (ref 4.4–5.9)
SP GR UR STRIP: 1.01 (ref 1–1.03)
UROBILINOGEN UR STRIP-ACNC: 0.2 E.U./DL
WBC # BLD AUTO: 5 10E3/UL (ref 4–11)

## 2024-12-09 PROCEDURE — 86140 C-REACTIVE PROTEIN: CPT | Performed by: NURSE PRACTITIONER

## 2024-12-09 PROCEDURE — 36415 COLL VENOUS BLD VENIPUNCTURE: CPT | Performed by: NURSE PRACTITIONER

## 2024-12-09 PROCEDURE — 83690 ASSAY OF LIPASE: CPT | Performed by: NURSE PRACTITIONER

## 2024-12-09 PROCEDURE — 81003 URINALYSIS AUTO W/O SCOPE: CPT | Performed by: NURSE PRACTITIONER

## 2024-12-09 PROCEDURE — 80053 COMPREHEN METABOLIC PANEL: CPT | Performed by: NURSE PRACTITIONER

## 2024-12-09 PROCEDURE — 99214 OFFICE O/P EST MOD 30 MIN: CPT | Performed by: NURSE PRACTITIONER

## 2024-12-09 PROCEDURE — 85025 COMPLETE CBC W/AUTO DIFF WBC: CPT | Performed by: NURSE PRACTITIONER

## 2024-12-09 ASSESSMENT — PAIN SCALES - GENERAL: PAINLEVEL_OUTOF10: MILD PAIN (3)

## 2024-12-09 NOTE — PROGRESS NOTES
"  Assessment & Plan     Generalized abdominal pain  Nausea  Bloating  History of diverticulitis  Diarrhea, unspecified type  Hx of diverticulitis.  Symptoms are different then his usually symptoms has an infection at least once yearly.  Last one 5/2023.  Labs ordered today  CT ordered STAT due to pain and length of symptoms  ER if symptoms worsen  Discussed ADS today or tomorrow but patient prefers to get the CT done on it's own.  - CT Abdomen Pelvis w Contrast; Future  - Comprehensive metabolic panel  - UA Macroscopic with reflex to Microscopic and Culture  - CBC with platelets and differential  - Lipase  - CRP, inflammation  - Helicobacter pylori Antigen Stool  - Enteric Bacteria and Virus Panel by [facility] Stool      Subjective   Phillip is a 59 year old, presenting for the following health issues:  Gastrointestinal Problem        12/9/2024    12:51 PM   Additional Questions   Roomed by [facility]   Accompanied by self     History of Present Illness       Reason for visit:  Abdominal distress  Symptom onset:  1-2 weeks ago  Symptom intensity:  Moderate  Symptom progression:  Worsening  Had these symptoms before:  Yes  Has tried/received treatment for these symptoms:  Yes  Previous treatment was successful:  Yes  Prior treatment description:  Meds  What makes it worse:  N/A  What makes it better:  Meds   He is taking medications regularly.       Hx of diverticulitis.  Last episode 5/2023.  States this started just before Thanksgiving.    Nausea, diarrhea, bloating.  Hx of heartburn.   Generalized pain. Severe.      Review of Systems  Constitutional, HEENT, cardiovascular, pulmonary, gi and gu systems are negative, except as otherwise noted.      Objective    /84 (BP Location: Right arm, Patient Position: Sitting, Cuff Size: Adult Large)   Pulse 85   Temp 97.1  F (36.2  C) (Temporal)   Resp 20   Ht 1.73 m (5' 8.11\")   Wt 96.8 kg (213 lb 6.4 oz)   SpO2 98%   BMI 32.34 kg/m    Body mass index is 32.34 kg/m .    Physical " Exam   GENERAL: alert and no distress  RESP: lungs clear to auscultation - no rales, rhonchi or wheezes  CV: regular rate and rhythm, normal S1 S2, no S3 or S4, no murmur, click or rub, no peripheral edema  ABDOMEN: tenderness generalized and bowel sounds normal  PSYCH: mentation appears normal, affect normal/bright          Signed Electronically by: Cynthia Alfonso NP

## 2024-12-09 NOTE — PATIENT INSTRUCTIONS
It was nice seeing you today.    Please let me know if you have any questions regarding today's visit!    Take care,    DEONNA Alfonso DNP  Family Medicine

## 2024-12-10 ENCOUNTER — HOSPITAL ENCOUNTER (OUTPATIENT)
Dept: CT IMAGING | Facility: CLINIC | Age: 59
Discharge: HOME | End: 2024-12-10
Attending: NURSE PRACTITIONER
Payer: COMMERCIAL

## 2024-12-10 DIAGNOSIS — R11.0 NAUSEA: ICD-10-CM

## 2024-12-10 DIAGNOSIS — R10.84 GENERALIZED ABDOMINAL PAIN: ICD-10-CM

## 2024-12-10 DIAGNOSIS — R14.0 BLOATING: ICD-10-CM

## 2024-12-10 DIAGNOSIS — Z87.19 HISTORY OF DIVERTICULITIS: ICD-10-CM

## 2024-12-10 DIAGNOSIS — R19.7 DIARRHEA, UNSPECIFIED TYPE: ICD-10-CM

## 2024-12-10 LAB
ALBUMIN SERPL BCG-MCNC: 4.4 G/DL (ref 3.5–5.2)
ALP SERPL-CCNC: 77 U/L (ref 40–150)
ALT SERPL W P-5'-P-CCNC: 49 U/L (ref 0–70)
ANION GAP SERPL CALCULATED.3IONS-SCNC: 14 MMOL/L (ref 7–15)
AST SERPL W P-5'-P-CCNC: 27 U/L (ref 0–45)
BILIRUB SERPL-MCNC: 0.3 MG/DL
BUN SERPL-MCNC: 10.5 MG/DL (ref 6–20)
CALCIUM SERPL-MCNC: 9.3 MG/DL (ref 8.8–10.4)
CHLORIDE SERPL-SCNC: 102 MMOL/L (ref 98–107)
CREAT SERPL-MCNC: 0.91 MG/DL (ref 0.67–1.17)
CRP SERPL-MCNC: 6.71 MG/L
EGFRCR SERPLBLD CKD-EPI 2021: >90 ML/MIN/1.73M2
GLUCOSE SERPL-MCNC: 79 MG/DL (ref 70–99)
HCO3 SERPL-SCNC: 24 MMOL/L (ref 22–29)
LIPASE SERPL-CCNC: 37 U/L (ref 13–60)
POTASSIUM SERPL-SCNC: 4.1 MMOL/L (ref 3.4–5.3)
PROT SERPL-MCNC: 6.9 G/DL (ref 6.4–8.3)
SODIUM SERPL-SCNC: 140 MMOL/L (ref 135–145)

## 2024-12-10 PROCEDURE — 87338 HPYLORI STOOL AG IA: CPT | Performed by: NURSE PRACTITIONER

## 2024-12-10 PROCEDURE — 74177 CT ABD & PELVIS W/CONTRAST: CPT

## 2024-12-10 PROCEDURE — 250N000011 HC RX IP 250 OP 636: Performed by: NURSE PRACTITIONER

## 2024-12-10 PROCEDURE — 250N000009 HC RX 250: Performed by: NURSE PRACTITIONER

## 2024-12-10 PROCEDURE — 74177 CT ABD & PELVIS W/CONTRAST: CPT | Mod: 26 | Performed by: RADIOLOGY

## 2024-12-10 PROCEDURE — 87507 IADNA-DNA/RNA PROBE TQ 12-25: CPT | Performed by: NURSE PRACTITIONER

## 2024-12-10 RX ORDER — IOPAMIDOL 755 MG/ML
131 INJECTION, SOLUTION INTRAVASCULAR ONCE
Status: COMPLETED | OUTPATIENT
Start: 2024-12-10 | End: 2024-12-10

## 2024-12-10 RX ADMIN — IOPAMIDOL 131 ML: 755 INJECTION, SOLUTION INTRAVENOUS at 07:56

## 2024-12-10 RX ADMIN — SODIUM CHLORIDE, PRESERVATIVE FREE 83 ML: 5 INJECTION INTRAVENOUS at 07:56

## 2024-12-12 LAB

## 2024-12-31 ENCOUNTER — HOSPITAL ENCOUNTER (OUTPATIENT)
Dept: MRI IMAGING | Facility: CLINIC | Age: 59
Discharge: HOME | End: 2024-12-31
Attending: NURSE PRACTITIONER
Payer: COMMERCIAL

## 2024-12-31 DIAGNOSIS — U07.1 MYOCARDITIS DUE TO COVID-19 VIRUS: ICD-10-CM

## 2024-12-31 DIAGNOSIS — I40.0 MYOCARDITIS DUE TO COVID-19 VIRUS: ICD-10-CM

## 2024-12-31 PROCEDURE — A9585 GADOBUTROL INJECTION: HCPCS | Performed by: NURSE PRACTITIONER

## 2024-12-31 PROCEDURE — 255N000002 HC RX 255 OP 636: Performed by: NURSE PRACTITIONER

## 2024-12-31 PROCEDURE — 75561 CARDIAC MRI FOR MORPH W/DYE: CPT

## 2024-12-31 RX ORDER — GADOBUTROL 604.72 MG/ML
10 INJECTION INTRAVENOUS ONCE
Status: COMPLETED | OUTPATIENT
Start: 2024-12-31 | End: 2024-12-31

## 2024-12-31 RX ADMIN — GADOBUTROL 12 ML: 604.72 INJECTION INTRAVENOUS at 14:30

## 2025-01-15 ENCOUNTER — TELEPHONE (OUTPATIENT)
Dept: PEDIATRICS | Facility: CLINIC | Age: 60
End: 2025-01-15
Payer: COMMERCIAL

## 2025-01-15 NOTE — TELEPHONE ENCOUNTER
"Received CC'd chart from provider with following message:    \"Please call patient and be sure he understands he needs to see cardiology before taking Viagra or engaging in physical activity.  This came from instruction from the cardiology office as well.\"    RN attempted to contact patient at [phone]. No answer, left message requesting call back to any triage nurse. Call back number was provided.    Upon call back:  - relay provider message above.   - patient has appt with cardiology on 2/20/25     Lois Luther RN       " Detail Level: Simple Plan: There is no clinical lesion remaining on today's exam (s/p biopsy). No further treatment is indicated at this time, however, patient to return with any signs of recurrence.

## 2025-01-16 NOTE — TELEPHONE ENCOUNTER
Patient calling back. RN relayed provider message. Patient was given an opportunity to ask questions, verbalized understanding of plan, and is agreeable.    Elyse Baird RN

## 2025-01-16 NOTE — TELEPHONE ENCOUNTER
"2nd attempt  Left message asking patient to return the call regarding Dr. Chapman's message:     \"Please call patient and be sure he understands he needs to see cardiology before taking Viagra or engaging in physical activity.  This came from instruction from the cardiology office as well.\"   "

## 2025-01-28 SDOH — HEALTH STABILITY: PHYSICAL HEALTH: ON AVERAGE, HOW MANY MINUTES DO YOU ENGAGE IN EXERCISE AT THIS LEVEL?: 30 MIN

## 2025-01-28 SDOH — HEALTH STABILITY: PHYSICAL HEALTH: ON AVERAGE, HOW MANY DAYS PER WEEK DO YOU ENGAGE IN MODERATE TO STRENUOUS EXERCISE (LIKE A BRISK WALK)?: 2 DAYS

## 2025-01-28 ASSESSMENT — ASTHMA QUESTIONNAIRES
QUESTION_2 LAST FOUR WEEKS HOW OFTEN HAVE YOU HAD SHORTNESS OF BREATH: NOT AT ALL
QUESTION_1 LAST FOUR WEEKS HOW MUCH OF THE TIME DID YOUR ASTHMA KEEP YOU FROM GETTING AS MUCH DONE AT WORK, SCHOOL OR AT HOME: NONE OF THE TIME
ACT_TOTALSCORE: 25
ACT_TOTALSCORE: 25
QUESTION_4 LAST FOUR WEEKS HOW OFTEN HAVE YOU USED YOUR RESCUE INHALER OR NEBULIZER MEDICATION (SUCH AS ALBUTEROL): NOT AT ALL
QUESTION_5 LAST FOUR WEEKS HOW WOULD YOU RATE YOUR ASTHMA CONTROL: COMPLETELY CONTROLLED
QUESTION_3 LAST FOUR WEEKS HOW OFTEN DID YOUR ASTHMA SYMPTOMS (WHEEZING, COUGHING, SHORTNESS OF BREATH, CHEST TIGHTNESS OR PAIN) WAKE YOU UP AT NIGHT OR EARLIER THAN USUAL IN THE MORNING: NOT AT ALL

## 2025-01-28 ASSESSMENT — SOCIAL DETERMINANTS OF HEALTH (SDOH): HOW OFTEN DO YOU GET TOGETHER WITH FRIENDS OR RELATIVES?: PATIENT DECLINED

## 2025-01-29 ENCOUNTER — OFFICE VISIT (OUTPATIENT)
Dept: PEDIATRICS | Facility: CLINIC | Age: 60
End: 2025-01-29
Payer: COMMERCIAL

## 2025-01-29 VITALS
BODY MASS INDEX: 33.13 KG/M2 | OXYGEN SATURATION: 97 % | RESPIRATION RATE: 18 BRPM | WEIGHT: 218.6 LBS | TEMPERATURE: 98 F | HEIGHT: 68 IN | HEART RATE: 91 BPM | DIASTOLIC BLOOD PRESSURE: 80 MMHG | SYSTOLIC BLOOD PRESSURE: 126 MMHG

## 2025-01-29 DIAGNOSIS — Z13.6 CARDIOVASCULAR SCREENING; LDL GOAL LESS THAN 160: ICD-10-CM

## 2025-01-29 DIAGNOSIS — N52.9 ERECTILE DYSFUNCTION, UNSPECIFIED ERECTILE DYSFUNCTION TYPE: ICD-10-CM

## 2025-01-29 DIAGNOSIS — I51.4 MYOCARDITIS, UNSPECIFIED CHRONICITY, UNSPECIFIED MYOCARDITIS TYPE (H): ICD-10-CM

## 2025-01-29 DIAGNOSIS — Z00.00 ROUTINE GENERAL MEDICAL EXAMINATION AT A HEALTH CARE FACILITY: Primary | ICD-10-CM

## 2025-01-29 DIAGNOSIS — Z12.5 PROSTATE CANCER SCREENING: ICD-10-CM

## 2025-01-29 PROCEDURE — 99213 OFFICE O/P EST LOW 20 MIN: CPT | Mod: 25 | Performed by: INTERNAL MEDICINE

## 2025-01-29 PROCEDURE — G2211 COMPLEX E/M VISIT ADD ON: HCPCS | Performed by: INTERNAL MEDICINE

## 2025-01-29 PROCEDURE — 99396 PREV VISIT EST AGE 40-64: CPT | Performed by: INTERNAL MEDICINE

## 2025-01-29 RX ORDER — SILDENAFIL 100 MG/1
TABLET, FILM COATED ORAL
Qty: 8 TABLET | Refills: 11 | Status: SHIPPED | OUTPATIENT
Start: 2025-01-29

## 2025-01-29 ASSESSMENT — PAIN SCALES - GENERAL: PAINLEVEL_OUTOF10: NO PAIN (0)

## 2025-01-29 NOTE — PATIENT INSTRUCTIONS
Patient Education   Preventive Care Advice   This is general advice given by our system to help you stay healthy. However, your care team may have specific advice just for you. Please talk to your care team about your preventive care needs.  Nutrition  Eat 5 or more servings of fruits and vegetables each day.  Try wheat bread, brown rice and whole grain pasta (instead of white bread, rice, and pasta).  Get enough calcium and vitamin D. Check the label on foods and aim for 100% of the RDA (recommended daily allowance).  Lifestyle  Exercise at least 150 minutes each week  (30 minutes a day, 5 days a week).  Do muscle strengthening activities 2 days a week. These help control your weight and prevent disease.  No smoking.  Wear sunscreen to prevent skin cancer.  Have a dental exam and cleaning every 6 months.  Yearly exams  See your health care team every year to talk about:  Any changes in your health.  Any medicines your care team has prescribed.  Preventive care, family planning, and ways to prevent chronic diseases.  Shots (vaccines)   HPV shots (up to age 26), if you've never had them before.  Hepatitis B shots (up to age 59), if you've never had them before.  COVID-19 shot: Get this shot when it's due.  Flu shot: Get a flu shot every year.  Tetanus shot: Get a tetanus shot every 10 years.  Pneumococcal, hepatitis A, and RSV shots: Ask your care team if you need these based on your risk.  Shingles shot (for age 50 and up)  General health tests  Diabetes screening:  Starting at age 35, Get screened for diabetes at least every 3 years.  If you are younger than age 35, ask your care team if you should be screened for diabetes.  Cholesterol test: At age 39, start having a cholesterol test every 5 years, or more often if advised.  Bone density scan (DEXA): At age 50, ask your care team if you should have this scan for osteoporosis (brittle bones).  Hepatitis C: Get tested at least once in your life.  STIs (sexually  transmitted infections)  Before age 24: Ask your care team if you should be screened for STIs.  After age 24: Get screened for STIs if you're at risk. You are at risk for STIs (including HIV) if:  You are sexually active with more than one person.  You don't use condoms every time.  You or a partner was diagnosed with a sexually transmitted infection.  If you are at risk for HIV, ask about PrEP medicine to prevent HIV.  Get tested for HIV at least once in your life, whether you are at risk for HIV or not.  Cancer screening tests  Cervical cancer screening: If you have a cervix, begin getting regular cervical cancer screening tests starting at age 21.  Breast cancer scan (mammogram): If you've ever had breasts, begin having regular mammograms starting at age 40. This is a scan to check for breast cancer.  Colon cancer screening: It is important to start screening for colon cancer at age 45.  Have a colonoscopy test every 10 years (or more often if you're at risk) Or, ask your provider about stool tests like a FIT test every year or Cologuard test every 3 years.  To learn more about your testing options, visit:   .  For help making a decision, visit:   https://bit.ly/vs90915.  Prostate cancer screening test: If you have a prostate, ask your care team if a prostate cancer screening test (PSA) at age 55 is right for you.  Lung cancer screening: If you are a current or former smoker ages 50 to 80, ask your care team if ongoing lung cancer screenings are right for you.  For informational purposes only. Not to replace the advice of your health care provider. Copyright   2023 [employer]. All rights reserved. Clinically reviewed by the [facility]. [employer] 257059 - REV 01/24.

## 2025-01-29 NOTE — PROGRESS NOTES
Preventive Care Visit  [facility] OLIVERIO Champan MD, Internal Medicine - Pediatrics  Jan 29, 2025      Assessment & Plan     Routine general medical examination at a health care facility  d/w pt preventative care measures including seat belt use, bike helmet, moderation of EtOH, avoiding tobacco, avoiding excessive sun exposure/sunscreen, wt management or wt loss if BMI > 30, need to screen for lipid disorders, mood disorders, CAD risk factors, etc. Also discussed accident prevention and future RHM schedule.   - Lipid panel reflex to direct LDL Fasting; Future  - Comprehensive metabolic panel (BMP + Alb, Alk Phos, ALT, AST, Total. Bili, TP); Future  - PSA, screen; Future    Myocarditis, unspecified chronicity, unspecified myocarditis type (H)  discussed with patient (or patient's parents/caregiver) pathophysiology of condition and treatment options.  reviwed repeat MRI, looks good. reviwed cath and other results with patient as well. Patient has follow-up appointment with cardiology scheduled.   - Lipid panel reflex to direct LDL Fasting; Future  - Comprehensive metabolic panel (BMP + Alb, Alk Phos, ALT, AST, Total. Bili, TP); Future    Prostate cancer screening  - PSA, screen; Future    CARDIOVASCULAR SCREENING; LDL GOAL LESS THAN 160  due for labs, reviwed diet etc.   - Lipid panel reflex to direct LDL Fasting; Future  - Comprehensive metabolic panel (BMP + Alb, Alk Phos, ALT, AST, Total. Bili, TP); Future  - PSA, screen; Future    Erectile dysfunction, unspecified erectile dysfunction type  discussed with patient (or patient's parents/caregiver) pathophysiology of condition and treatment options.  Well controlled on current medication(s).   - sildenafil (VIAGRA) 100 MG tablet; take 1/2 to one tab daily as needed for erectile dysfunction. take 30 min to 4 hours before sexual activity    The longitudinal plan of care for the diagnosis(es)/condition(s) as documented were addressed during  "this visit. Due to the added complexity in care, I will continue to support Phillip in the subsequent management and with ongoing continuity of care.        BMI  Estimated body mass index is 33.24 kg/m  as calculated from the following:    Height as of this encounter: 1.727 m (5' 8\").    Weight as of this encounter: 99.2 kg (218 lb 9.6 oz).   Weight management plan: Discussed healthy diet and exercise guidelines    Counseling  Appropriate preventive services were addressed with this patient via screening, questionnaire, or discussion as appropriate for fall prevention, nutrition, physical activity, Tobacco-use cessation, social engagement, weight loss and cognition.  Checklist reviewing preventive services available has been given to the patient.  Reviewed patient's diet, addressing concerns and/or questions.   He is at risk for lack of exercise and has been provided with information to increase physical activity for the benefit of his well-being.       Return in about 1 year (around 1/29/2026) for Routine Visit, or sooner if symptoms persist or worsen.      Subjective   Phillip is a 59 year old, presenting for the following:  Annual Visit        1/29/2025    10:44 AM   Additional Questions   Roomed by carol   Accompanied by latrell         1/29/2025    10:44 AM   Patient Reported Additional Medications   Patient reports taking the following new medications latrell          HPI  here for annual visit. feels well, gi issues resolved. wants to work on weight loss. treid the saw palmetto and this has made things worse, stopped this and back on the regular saw palmetto.had myocarditis and feels better, did see cardiology had follow-up withcards and follow-up MRI looked good. not interested in any vaccines today. No other concerns or complaints today.        Health Care Directive  Patient has a Health Care Directive on file  Discussed advance care planning with patient.      1/28/2025   General Health   How would you rate your overall " physical health? Good   Feel stress (tense, anxious, or unable to sleep) Only a little   (!) STRESS CONCERN      1/28/2025   Nutrition   Three or more servings of calcium each day? Yes   Diet: Regular (no restrictions)   How many servings of fruit and vegetables per day? (!) 0-1   How many sweetened beverages each day? 0-1         1/28/2025   Exercise   Days per week of moderate/strenous exercise 2 days   Average minutes spent exercising at this level 30 min   (!) EXERCISE CONCERN      1/28/2025   Social Factors   Frequency of gathering with friends or relatives Patient declined   Worry food won't last until get money to buy more No   Food not last or not have enough money for food? No   Do you have housing? (Housing is defined as stable permanent housing and does not include staying ouside in a car, in a tent, in an abandoned building, in an overnight shelter, or couch-surfing.) Yes   Are you worried about losing your housing? No   Lack of transportation? No   Unable to get utilities (heat,electricity)? No         1/28/2025   Fall Risk   Fallen 2 or more times in the past year? No   Trouble with walking or balance? No          1/28/2025   Dental   Dentist two times every year? Yes         1/28/2025   TB Screening   Were you born outside of the [place]? No         Today's PHQ-2 Score:       1/28/2025     9:31 PM   PHQ-2 ( 1999 [employer])   Q1: Little interest or pleasure in doing things 1   Q2: Feeling down, depressed or hopeless 0   PHQ-2 Score 1    Q1: Little interest or pleasure in doing things Several days   Q2: Feeling down, depressed or hopeless Not at all   PHQ-2 Score 1       Patient-reported           1/28/2025   Substance Use   Alcohol more than 3/day or more than 7/wk No   Do you use any other substances recreationally? No     Social History     Tobacco Use    Smoking status: Never     Passive exposure: Never    Smokeless tobacco: Never   Vaping Use    Vaping status: Never Used   Substance Use Topics    Alcohol  "use: Yes     Comment: occasional    Drug use: No           1/28/2025   STI Screening   New sexual partner(s) since last STI/HIV test? No   Last PSA:   PSA   Date Value Ref Range Status   12/10/2019 0.74 0 - 4 ug/L Final     Comment:     Assay Method:  Chemiluminescence using Siemens Vista analyzer     Prostate Specific Antigen Screen   Date Value Ref Range Status   02/10/2024 0.51 0.00 - 3.50 ng/mL Final   07/14/2021 0.49 ug/L Final     ASCVD Risk   The ASCVD Risk score (Bere BEAVER, et al., 2019) failed to calculate for the following reasons:    Risk score cannot be calculated because patient has a medical history suggesting prior/existing ASCVD           Reviewed and updated as needed this visit by Provider                             Objective    Exam  /80 (BP Location: Right arm, Patient Position: Sitting, Cuff Size: Adult Regular)   Pulse 91   Temp 98  F (36.7  C) (Tympanic)   Resp 18   Ht 1.727 m (5' 8\")   Wt 99.2 kg (218 lb 9.6 oz)   SpO2 97%   BMI 33.24 kg/m     Estimated body mass index is 33.24 kg/m  as calculated from the following:    Height as of this encounter: 1.727 m (5' 8\").    Weight as of this encounter: 99.2 kg (218 lb 9.6 oz).    Physical Exam  GENERAL: healthy, alert and no distress  EYES: Eyes grossly normal to inspection, PERRL and conjunctivae and sclerae normal  HENT: ear canals and TM's normal, op clear, mucous membranes moist   NECK: no adenopathy, no asymmetry  RESP: lungs clear to auscultation - no rales, rhonchi or wheezes  CV: regular rate and rhythm, normal S1 S2, no S3 or S4, no murmur, click or rub, no peripheral edema   ABDOMEN: soft, nontender, no hepatosplenomegaly, no masses and bowel sounds normal  MS: no gross musculoskeletal defects noted, no edema  SKIN: no suspicious lesions or rashes  NEURO: Normal strength and tone, mentation intact and speech normal  PSYCH: mentation appears normal, affect normal/bright          Signed Electronically by: Albert HARDY" MD Ari

## 2025-02-08 ENCOUNTER — LAB (OUTPATIENT)
Dept: LAB | Facility: CLINIC | Age: 60
End: 2025-02-08
Payer: COMMERCIAL

## 2025-02-08 DIAGNOSIS — Z00.00 ROUTINE GENERAL MEDICAL EXAMINATION AT A HEALTH CARE FACILITY: ICD-10-CM

## 2025-02-08 DIAGNOSIS — Z13.6 CARDIOVASCULAR SCREENING; LDL GOAL LESS THAN 160: ICD-10-CM

## 2025-02-08 DIAGNOSIS — Z12.5 PROSTATE CANCER SCREENING: ICD-10-CM

## 2025-02-08 DIAGNOSIS — I51.4 MYOCARDITIS, UNSPECIFIED CHRONICITY, UNSPECIFIED MYOCARDITIS TYPE (H): ICD-10-CM

## 2025-02-08 LAB
ALBUMIN SERPL BCG-MCNC: 4.3 G/DL (ref 3.5–5.2)
ALP SERPL-CCNC: 61 U/L (ref 40–150)
ALT SERPL W P-5'-P-CCNC: 30 U/L (ref 0–70)
ANION GAP SERPL CALCULATED.3IONS-SCNC: 9 MMOL/L (ref 7–15)
AST SERPL W P-5'-P-CCNC: 22 U/L (ref 0–45)
BILIRUB SERPL-MCNC: 0.5 MG/DL
BUN SERPL-MCNC: 12.9 MG/DL (ref 6–20)
CALCIUM SERPL-MCNC: 9.3 MG/DL (ref 8.8–10.4)
CHLORIDE SERPL-SCNC: 103 MMOL/L (ref 98–107)
CHOLEST SERPL-MCNC: 162 MG/DL
CREAT SERPL-MCNC: 0.84 MG/DL (ref 0.67–1.17)
EGFRCR SERPLBLD CKD-EPI 2021: >90 ML/MIN/1.73M2
FASTING STATUS PATIENT QL REPORTED: YES
FASTING STATUS PATIENT QL REPORTED: YES
GLUCOSE SERPL-MCNC: 91 MG/DL (ref 70–99)
HCO3 SERPL-SCNC: 28 MMOL/L (ref 22–29)
HDLC SERPL-MCNC: 37 MG/DL
LDLC SERPL CALC-MCNC: 102 MG/DL
NONHDLC SERPL-MCNC: 125 MG/DL
POTASSIUM SERPL-SCNC: 4.5 MMOL/L (ref 3.4–5.3)
PROT SERPL-MCNC: 6.6 G/DL (ref 6.4–8.3)
PSA SERPL DL<=0.01 NG/ML-MCNC: 0.39 NG/ML (ref 0–3.5)
SODIUM SERPL-SCNC: 140 MMOL/L (ref 135–145)
TRIGL SERPL-MCNC: 113 MG/DL

## 2025-02-08 PROCEDURE — 80061 LIPID PANEL: CPT

## 2025-02-08 PROCEDURE — G0103 PSA SCREENING: HCPCS

## 2025-02-08 PROCEDURE — 80053 COMPREHEN METABOLIC PANEL: CPT

## 2025-02-08 PROCEDURE — 36415 COLL VENOUS BLD VENIPUNCTURE: CPT

## 2025-05-28 ENCOUNTER — OFFICE VISIT (OUTPATIENT)
Dept: OPTOMETRY | Facility: CLINIC | Age: 60
End: 2025-05-28
Payer: COMMERCIAL

## 2025-05-28 DIAGNOSIS — Z98.890 S/P LASIK SURGERY: ICD-10-CM

## 2025-05-28 DIAGNOSIS — H52.202 MYOPIA OF LEFT EYE WITH ASTIGMATISM: ICD-10-CM

## 2025-05-28 DIAGNOSIS — H52.4 PRESBYOPIA: Primary | ICD-10-CM

## 2025-05-28 DIAGNOSIS — H52.12 MYOPIA OF LEFT EYE WITH ASTIGMATISM: ICD-10-CM

## 2025-05-28 PROCEDURE — 92014 COMPRE OPH EXAM EST PT 1/>: CPT | Performed by: OPTOMETRIST

## 2025-05-28 PROCEDURE — 92015 DETERMINE REFRACTIVE STATE: CPT | Performed by: OPTOMETRIST

## 2025-05-28 ASSESSMENT — EXTERNAL EXAM - LEFT EYE: OS_EXAM: NORMAL

## 2025-05-28 ASSESSMENT — REFRACTION_MANIFEST
OD_ADD: +2.00
OS_CYLINDER: +0.50
OD_CYLINDER: SPHERE
OS_SPHERE: -0.75
OD_SPHERE: -0.25
OS_SPHERE: -1.00
OD_CYLINDER: SPHERE
OS_CYLINDER: +0.50
OS_ADD: +2.00
OD_SPHERE: PLANO
OS_AXIS: 007
OS_AXIS: 015
METHOD_AUTOREFRACTION: 1

## 2025-05-28 ASSESSMENT — KERATOMETRY
OS_K1POWER_DIOPTERS: 41
OS_AXISANGLE2_DEGREES: 138
OD_AXISANGLE2_DEGREES: 160
OD_K1POWER_DIOPTERS: 40.50
OS_K2POWER_DIOPTERS: 41.62
OD_K2POWER_DIOPTERS: 41.25
OS_AXISANGLE_DEGREES: 48
OD_AXISANGLE_DEGREES: 70

## 2025-05-28 ASSESSMENT — VISUAL ACUITY
METHOD: SNELLEN - LINEAR
OS_SC: 20/60
OD_SC: 20/20
OD_SC: 20/40
OS_SC: 20/20

## 2025-05-28 ASSESSMENT — SLIT LAMP EXAM - LIDS: COMMENTS: NORMAL

## 2025-05-28 ASSESSMENT — TONOMETRY
OS_IOP_MMHG: 16
IOP_METHOD: APPLANATION
OD_IOP_MMHG: 16

## 2025-05-28 ASSESSMENT — CONF VISUAL FIELD
OD_INFERIOR_TEMPORAL_RESTRICTION: 0
OS_INFERIOR_TEMPORAL_RESTRICTION: 0
OD_INFERIOR_NASAL_RESTRICTION: 0
OS_SUPERIOR_TEMPORAL_RESTRICTION: 0
OS_NORMAL: 1
METHOD: COUNTING FINGERS
OD_NORMAL: 1
OS_SUPERIOR_NASAL_RESTRICTION: 0
OD_SUPERIOR_NASAL_RESTRICTION: 0
OD_SUPERIOR_TEMPORAL_RESTRICTION: 0
OS_INFERIOR_NASAL_RESTRICTION: 0

## 2025-05-28 ASSESSMENT — EXTERNAL EXAM - RIGHT EYE: OD_EXAM: NORMAL

## 2025-05-28 ASSESSMENT — CUP TO DISC RATIO
OS_RATIO: 0.2
OD_RATIO: 0.2

## 2025-05-28 NOTE — PROGRESS NOTES
Chief Complaint   Patient presents with    Annual Eye Exam      Pt reports lots of floaters in his left eye - wants to know if he can rid of them     Last Eye Exam: 03/2024  Dilated Previously: Yes, side effects of dilation explained today    What are you currently using to see?  readers       Distance Vision Acuity: Satisfied with vision    Near Vision Acuity: Satisfied with vision while reading and using computer with readers    Eye Comfort: dry  Do you use eye drops? : Yes: otc artificial tears when needed       Maribell Cardozo - Optometric Assistant           Medical, surgical and family histories reviewed and updated 5/28/2025.       OBJECTIVE: See Ophthalmology exam    ASSESSMENT:    ICD-10-CM    1. Presbyopia  H52.4 EYE EXAM (SIMPLE-NONBILLABLE)     REFRACTION      2. S/P LASIK surgery  Z98.890       3. Myopia of left eye with astigmatism  H52.12 REFRACTION    H52.202           PLAN:   Discussed vitreolysis/ cataract surgery  Content w/ over the counter readers   Trang Umana OD

## 2025-05-28 NOTE — LETTER
5/28/2025      Phillip Nassar  [address]  [place] [address]      Dear Colleague,    Thank you for referring your patient, Phillip Nassar, to the [facility] [place]. Please see a copy of my visit note below.    Chief Complaint   Patient presents with     Annual Eye Exam      Pt reports lots of floaters in his left eye - wants to know if he can rid of them     Last Eye Exam: 03/2024  Dilated Previously: Yes, side effects of dilation explained today    What are you currently using to see?  readers       Distance Vision Acuity: Satisfied with vision    Near Vision Acuity: Satisfied with vision while reading and using computer with readers    Eye Comfort: dry  Do you use eye drops? : Yes: otc artificial tears when needed       Maribell Cardozo - Optometric Assistant           Medical, surgical and family histories reviewed and updated 5/28/2025.       OBJECTIVE: See Ophthalmology exam    ASSESSMENT:    ICD-10-CM    1. Presbyopia  H52.4 EYE EXAM (SIMPLE-NONBILLABLE)     REFRACTION      2. S/P LASIK surgery  Z98.890       3. Myopia of left eye with astigmatism  H52.12 REFRACTION    H52.202           PLAN:   Discussed vitreolysis/ cataract surgery  Content w/ over the counter readers   Trang Umana OD     Again, thank you for allowing me to participate in the care of your patient.        Sincerely,        Trang Umana, OD    Electronically signed

## (undated) DEVICE — CATH DIAG 4FR JL 4.5 538417

## (undated) DEVICE — CATH ANGIO INFINITI JL5 4FRX100CM 538422

## (undated) DEVICE — CATH ANGIO INFINITI 3DRC 4FRX100CM 538476

## (undated) DEVICE — CATH DIAG 4FR ANG PIG 538453S

## (undated) DEVICE — KIT HAND CONTROL ANGIOTOUCH ACIST 65CM AT-P65

## (undated) DEVICE — MANIFOLD KIT ANGIO AUTOMATED 014613

## (undated) DEVICE — INTRO SHEATH 4FRX10CM PINNACLE RSS402

## (undated) DEVICE — KIT ENDO TURNOVER/PROCEDURE W/CLEAN A SCOPE LINERS 103888

## (undated) DEVICE — GUIDEWIRE VASC 0.035INX150CM INQWIRE J TIP IQ35F150J3F/A

## (undated) DEVICE — DEFIB PRO-PADZ LVP LQD GEL ADULT 8900-2105-01

## (undated) DEVICE — KIT ENDO TURNOVER/PROCEDURE CARRY-ON 101822

## (undated) RX ORDER — FENTANYL CITRATE 50 UG/ML
INJECTION, SOLUTION INTRAMUSCULAR; INTRAVENOUS
Status: DISPENSED
Start: 2017-02-28

## (undated) RX ORDER — FENTANYL CITRATE 50 UG/ML
INJECTION, SOLUTION INTRAMUSCULAR; INTRAVENOUS
Status: DISPENSED
Start: 2024-10-07

## (undated) RX ORDER — DEXAMETHASONE SODIUM PHOSPHATE 10 MG/ML
INJECTION, SOLUTION INTRAMUSCULAR; INTRAVENOUS
Status: DISPENSED
Start: 2017-12-20

## (undated) RX ORDER — HEPARIN SODIUM 1000 [USP'U]/ML
INJECTION, SOLUTION INTRAVENOUS; SUBCUTANEOUS
Status: DISPENSED
Start: 2024-10-07

## (undated) RX ORDER — LIDOCAINE HYDROCHLORIDE 10 MG/ML
INJECTION, SOLUTION EPIDURAL; INFILTRATION; INTRACAUDAL; PERINEURAL
Status: DISPENSED
Start: 2017-12-20

## (undated) RX ORDER — NITROGLYCERIN 5 MG/ML
VIAL (ML) INTRAVENOUS
Status: DISPENSED
Start: 2024-10-07

## (undated) RX ORDER — SIMETHICONE 40MG/0.6ML
SUSPENSION, DROPS(FINAL DOSAGE FORM)(ML) ORAL
Status: DISPENSED
Start: 2023-01-30

## (undated) RX ORDER — LIDOCAINE HYDROCHLORIDE 10 MG/ML
INJECTION, SOLUTION EPIDURAL; INFILTRATION; INTRACAUDAL; PERINEURAL
Status: DISPENSED
Start: 2024-10-07

## (undated) RX ORDER — FENTANYL CITRATE 50 UG/ML
INJECTION, SOLUTION INTRAMUSCULAR; INTRAVENOUS
Status: DISPENSED
Start: 2023-01-30